# Patient Record
Sex: FEMALE | Race: OTHER | HISPANIC OR LATINO
[De-identification: names, ages, dates, MRNs, and addresses within clinical notes are randomized per-mention and may not be internally consistent; named-entity substitution may affect disease eponyms.]

---

## 2020-11-13 ENCOUNTER — APPOINTMENT (OUTPATIENT)
Dept: HEPATOLOGY | Facility: CLINIC | Age: 55
End: 2020-11-13
Payer: MEDICAID

## 2020-11-13 VITALS
HEIGHT: 58 IN | WEIGHT: 176 LBS | DIASTOLIC BLOOD PRESSURE: 83 MMHG | OXYGEN SATURATION: 97 % | HEART RATE: 68 BPM | RESPIRATION RATE: 16 BRPM | TEMPERATURE: 98 F | BODY MASS INDEX: 36.94 KG/M2 | SYSTOLIC BLOOD PRESSURE: 123 MMHG

## 2020-11-13 DIAGNOSIS — K75.9 INFLAMMATORY LIVER DISEASE, UNSPECIFIED: ICD-10-CM

## 2020-11-13 DIAGNOSIS — Z87.891 PERSONAL HISTORY OF NICOTINE DEPENDENCE: ICD-10-CM

## 2020-11-13 DIAGNOSIS — C50.911 MALIGNANT NEOPLASM OF UNSPECIFIED SITE OF RIGHT FEMALE BREAST: ICD-10-CM

## 2020-11-13 PROCEDURE — 99204 OFFICE O/P NEW MOD 45 MIN: CPT | Mod: 25

## 2020-11-13 NOTE — PHYSICAL EXAM
[Spider Angioma] : Spider angioma(s) was(were) observed [Non-Tender] : non-tender [Palmar Erythema] : Palmar Erythema [General Appearance - Alert] : alert [General Appearance - In No Acute Distress] : in no acute distress [General Appearance - Well Nourished] : well nourished [General Appearance - Well Developed] : well developed [Sclera] : the sclera and conjunctiva were normal [Oropharynx] : the oropharynx was normal [Respiration, Rhythm And Depth] : normal respiratory rhythm and effort [Exaggerated Use Of Accessory Muscles For Inspiration] : no accessory muscle use [Auscultation Breath Sounds / Voice Sounds] : lungs were clear to auscultation bilaterally [Heart Rate And Rhythm] : heart rate was normal and rhythm regular [Heart Sounds] : normal S1 and S2 [Edema] : there was no peripheral edema [Bowel Sounds] : normal bowel sounds [Abdomen Soft] : soft [Abdomen Tenderness] : non-tender [] : no hepato-splenomegaly [Abdomen Mass (___ Cm)] : no abdominal mass palpated [Abdomen Hernia] : no hernia was discovered [No CVA Tenderness] : no ~M costovertebral angle tenderness [No Spinal Tenderness] : no spinal tenderness [Abnormal Walk] : normal gait [Oriented To Time, Place, And Person] : oriented to person, place, and time [Impaired Insight] : insight and judgment were intact [Affect] : the affect was normal [Mood] : the mood was normal [Memory Recent] : recent memory was not impaired [Memory Remote] : remote memory was not impaired [Scleral Icterus] : No Scleral Icterus [Abdominal Bruit] : no abdominal bruit [Abdominal  Ascites] : no ascites [Asterixis] : no asterixis observed [Jaundice] : No jaundice [Depression] : no depression [Hallucinations] : ~T no ~M hallucinations [Delusions] : no ~T delusions [Suicidal Ideation] : no suicidal ideation [Homicidal Ideation] : no homicidal ideations [Preoccupiation With Violent Thoughts] : no preoccupation with violent thoughts [FreeTextEntry1] : Grossly intact

## 2020-11-13 NOTE — ASSESSMENT
[FreeTextEntry1] : 55 yo Female with Hx of breast cancer (Dx in 2014, s/p b/l mastectomy, s/p chemo-RT, was on anastrazole, as per patient was recently changed by his oncologist, Dr. Miles), obesity, colon polyps, Hx of cholecystectomy (gallstones), Hx of parathyroidectomy (no records, per patient was told to have "benign tumor"), glaucoma, recent conjunctivitis, was recently sent for liver biopsy by gastroenterologist, Dr. Alejandre ([phone]) because of abnormal liver tests. Biopsy was done 9/24/2020 at [facility]. The biopsy reports mild steatohepatitis, also moderate chronic inflammation and interface hepatitis (grade 2-3 of 4) and septal fibrous expansion and focal bridging fibrosis (stage 3-4). Not on medication. No labs available. With physical exam suggesting CLD. Here for 2nd opinion.\par \par - Ordered CBC, CMP, INR, Immunglobulin panel, ZAIRA, ASMA, anti-LKM, anti-SLA, AMA\par - Ordered HIV, hep A, B, C serology, tTG IgA, A1AT, ceruloplasmin\par - Ordered US abdomen\par - Will try to obtain records from Dr. Alejandre`s office\par - Will try to get records from Dr. Miles\par - Will try to get records from Dr. Giles`s office (per patient skin biopsy was planned)\par - Hold off with FibroScan until seeing labs since if high liver enzymes, could give falsely high result, and would do after improvement\par - Will reach out to liver pathologist Dr. De La Cruz for second opinion to evaluate slides - patient needs to request it\par - Further management based on above results\par - Discussed life style modifications, and natural Hx of steatohepatitis\par - Discussed that biopsy was favoring autoimmune hepatitis, and discussed natural Hx of AIH\par \par RTC 2 weeks

## 2020-11-13 NOTE — HISTORY OF PRESENT ILLNESS
[Needlestick Exposure] : no needlestick exposure [Infected Sexual Partner] : no infected sexual partner [IV Drug Use] : no IV drug use [Tattoo] : no tattoos [Body Piercing] : no body piercing [Hemodialysis] : no hemodialysis [Transfusion before 1992] : no transfusion before 1992 [Transplant before 1992] : no transplant before 1992 [Incarceration] : no incarceration [Alcohol Abuse] : no alcohol abuse [Autoimmune Disorder] : no autoimmune disorder [Household Contact to HBV] : no household contact to HBV [Travel to Endemic Area] : no travel to an endemic area [Occupational Exposure] : no occupational exposure [Cocaine Use] : no cocaine use [de-identified] : 55 yo Female with Hx of breast cancer (Dx in 2014, s/p b/l mastectomy, s/p chemo-RT, was on anastrazole, as per patient was recently changed by his oncologist, Dr. Miles), obesity, colon polyps, Hx of cholecystectomy (gallstones), Hx of parathyroidectomy (no records, per patient was told to have "benign tumor"), glaucoma, recent conjunctivitis, was recently sent for liver biopsy by gastroenterologist, Dr. Alejandre ([phone]) because of abnormal liver tests. No liver test records available. Biopsy was done 9/24/2020 at [facility]. The biopsy reports mild steatohepatitis, also moderate chronic inflammation and interface hepatitis (grade 2-3 of 4) and septal fibrous expansion and focal bridging fibrosis (stage 3-4). No labs available.\par Patient was not started on any medication for liver disease. Patient was sent for second opinion from Dr Miles`s office.  \par Patient is here today for initial evaluation. \par She states that overall feels well, but anxious because her daughter-in-law recently passed away during liver transplant. She denies any prior Hx of liver disease, FHx of liver disease, with obesity, being born in [facility] but no other risk factors. Denies OTC or herbal meds/supplements. \par She is noted with few spider naevi, palmar erythema.  [de-identified] : Born [facility] (moved in [place], last traveled 6 years ago)

## 2020-11-13 NOTE — REASON FOR VISIT
[Initial Evaluation] : an initial evaluation [Source: ______] : History obtained from [unfilled] [FreeTextEntry1] : Abnormal liver biopsy

## 2020-11-13 NOTE — REVIEW OF SYSTEMS
[Recent Weight Loss (___ Lbs)] : recent [unfilled] ~Ulb weight loss [Heartburn] : heartburn [Skin Lesions] : skin lesion [Negative] : Heme/Lymph [Fever] : no fever [Chills] : no chills [Abdominal Pain] : no abdominal pain [Vomiting] : no vomiting [Constipation] : no constipation [Diarrhea] : no diarrhea [Melena] : no melena [Dysuria] : no dysuria [Incontinence] : no incontinence [FreeTextEntry3] : 3 month "mendez feeling" of L eye, saw ophthalmologist, was diagnosed with conjunctivitis, treated, but also told to have glaucoma (was recommended surgery, pending) [FreeTextEntry7] : Hx of reflux, controlled with diet [FreeTextEntry8] : urinary frequency for 3 years, being followed by Urology [FreeTextEntry9] : Knee pain [de-identified] : LLE circular hyperkeratotic, hyperemic lesions (following with dermatology, Dr Giles  on [place]) [de-identified] : Hx of depression

## 2020-11-14 LAB
A1AT SERPL-MCNC: 127 MG/DL
AFP-TM SERPL-MCNC: 7.8 NG/ML
ALBUMIN SERPL ELPH-MCNC: 4.7 G/DL
ALP BLD-CCNC: 136 U/L
ALT SERPL-CCNC: 42 U/L
ANION GAP SERPL CALC-SCNC: 17 MMOL/L
AST SERPL-CCNC: 41 U/L
BASOPHILS # BLD AUTO: 0.02 K/UL
BASOPHILS NFR BLD AUTO: 0.4 %
BILIRUB DIRECT SERPL-MCNC: 0.1 MG/DL
BILIRUB SERPL-MCNC: 0.3 MG/DL
BUN SERPL-MCNC: 13 MG/DL
CALCIUM SERPL-MCNC: 10 MG/DL
CERULOPLASMIN SERPL-MCNC: 26 MG/DL
CHLORIDE SERPL-SCNC: 105 MMOL/L
CO2 SERPL-SCNC: 21 MMOL/L
CREAT SERPL-MCNC: 0.69 MG/DL
EOSINOPHIL # BLD AUTO: 0.12 K/UL
EOSINOPHIL NFR BLD AUTO: 2.2 %
GLUCOSE SERPL-MCNC: 102 MG/DL
HBV CORE IGG+IGM SER QL: NONREACTIVE
HBV SURFACE AB SER QL: REACTIVE
HBV SURFACE AG SER QL: NONREACTIVE
HCT VFR BLD CALC: 44.6 %
HCV AB SER QL: NONREACTIVE
HCV S/CO RATIO: 0.2 S/CO
HEPATITIS A IGG ANTIBODY: REACTIVE
HGB BLD-MCNC: 14 G/DL
HIV1+2 AB SPEC QL IA.RAPID: NONREACTIVE
IMM GRANULOCYTES NFR BLD AUTO: 0.2 %
INR PPP: 1.15 RATIO
LYMPHOCYTES # BLD AUTO: 2.45 K/UL
LYMPHOCYTES NFR BLD AUTO: 45.6 %
MAN DIFF?: NORMAL
MCHC RBC-ENTMCNC: 28.8 PG
MCHC RBC-ENTMCNC: 31.4 GM/DL
MCV RBC AUTO: 91.8 FL
MONOCYTES # BLD AUTO: 0.3 K/UL
MONOCYTES NFR BLD AUTO: 5.6 %
NEUTROPHILS # BLD AUTO: 2.47 K/UL
NEUTROPHILS NFR BLD AUTO: 46 %
PLATELET # BLD AUTO: 125 K/UL
POTASSIUM SERPL-SCNC: 3.9 MMOL/L
PROT SERPL-MCNC: 8.3 G/DL
PT BLD: 13.5 SEC
RBC # BLD: 4.86 M/UL
RBC # FLD: 14.2 %
SODIUM SERPL-SCNC: 143 MMOL/L
WBC # FLD AUTO: 5.37 K/UL

## 2020-11-16 LAB
DEPRECATED KAPPA LC FREE/LAMBDA SER: 1.35 RATIO
IGA SER QL IEP: 600 MG/DL
IGG SER QL IEP: 1717 MG/DL
IGM SER QL IEP: 102 MG/DL
KAPPA LC CSF-MCNC: 1.88 MG/DL
KAPPA LC SERPL-MCNC: 2.54 MG/DL
MITOCHONDRIA AB SER IF-ACNC: NORMAL
SMOOTH MUSCLE AB SER QL IF: ABNORMAL

## 2020-11-17 LAB
LKM AB SER QL IF: <20.1 UNITS
TTG IGA SER IA-ACNC: <1.2 U/ML
TTG IGA SER-ACNC: NEGATIVE
TTG IGG SER IA-ACNC: 3.5 U/ML
TTG IGG SER IA-ACNC: NEGATIVE

## 2020-11-18 ENCOUNTER — APPOINTMENT (OUTPATIENT)
Dept: ULTRASOUND IMAGING | Facility: HOSPITAL | Age: 55
End: 2020-11-18
Payer: MEDICAID

## 2020-11-18 ENCOUNTER — OUTPATIENT (OUTPATIENT)
Dept: OUTPATIENT SERVICES | Facility: HOSPITAL | Age: 55
LOS: 1 days | End: 2020-11-18
Payer: COMMERCIAL

## 2020-11-18 DIAGNOSIS — K74.00 HEPATIC FIBROSIS, UNSPECIFIED: ICD-10-CM

## 2020-11-18 LAB — SOLUBLE LIVER IGG SER IA-ACNC: 1.4

## 2020-11-18 PROCEDURE — 76700 US EXAM ABDOM COMPLETE: CPT

## 2020-11-18 PROCEDURE — 76700 US EXAM ABDOM COMPLETE: CPT | Mod: 26

## 2020-11-23 ENCOUNTER — NON-APPOINTMENT (OUTPATIENT)
Age: 55
End: 2020-11-23

## 2020-11-23 LAB
ANA SER IF-ACNC: NEGATIVE
HEV AB SER QL: POSITIVE

## 2020-11-24 ENCOUNTER — RESULT REVIEW (OUTPATIENT)
Age: 55
End: 2020-11-24

## 2020-12-09 ENCOUNTER — LABORATORY RESULT (OUTPATIENT)
Age: 55
End: 2020-12-09

## 2020-12-09 ENCOUNTER — APPOINTMENT (OUTPATIENT)
Dept: HEPATOLOGY | Facility: CLINIC | Age: 55
End: 2020-12-09
Payer: MEDICAID

## 2020-12-09 VITALS
BODY MASS INDEX: 36.94 KG/M2 | TEMPERATURE: 97.9 F | DIASTOLIC BLOOD PRESSURE: 87 MMHG | WEIGHT: 176 LBS | RESPIRATION RATE: 16 BRPM | HEIGHT: 58 IN | HEART RATE: 77 BPM | SYSTOLIC BLOOD PRESSURE: 106 MMHG | OXYGEN SATURATION: 98 %

## 2020-12-09 PROCEDURE — 99214 OFFICE O/P EST MOD 30 MIN: CPT

## 2020-12-09 PROCEDURE — 99072 ADDL SUPL MATRL&STAF TM PHE: CPT

## 2020-12-10 LAB
GGT SERPL-CCNC: 36 U/L
MPO AB + PR3 PNL SER: NORMAL
TSH SERPL-ACNC: 2.23 UIU/ML

## 2020-12-10 NOTE — ASSESSMENT
[FreeTextEntry1] : 53 yo Female with Hx of breast cancer (Dx in 2014, s/p b/l mastectomy, s/p chemo-RT, was on anastrazole, as per patient was recently changed by his oncologist, Dr. Miles), obesity, colon polyps, Hx of cholecystectomy (gallstones), Hx of parathyroidectomy (no records, per patient was told to have "benign tumor"), glaucoma, recent conjunctivitis, was recently sent for liver biopsy by gastroenterologist, Dr. Alejandre ([phone]) because of abnormal liver tests. Biopsy was done 9/24/2020 at [facility]. The biopsy reports mild steatohepatitis, also moderate chronic inflammation and interface hepatitis (grade 2-3 of 4) and septal fibrous expansion and focal bridging fibrosis (stage 3-4). Not on medication. No labs available. With physical exam suggesting CLD. Here for 2nd opinion.\par \par Moderate steatohepatitis \par - Discussed natural Hx of fatty liver and life style modifications in details\par - Ordered FibroScan\par \par Mild to moderate chronic hepatitis, suggestive of AIH based on biopsy or AI pattern drug induced\par - meds reviewed,  neither of them is linked to AI type liver injury\par - ZAIRA neg, ASMA weak pos 1:20, SLA and LKM neg, IgG elevated but < 2x ULN\par - Liver enzymes are minimally elevated, AST 41, ALT 42, \par - Based on above does not meet criteria of treatment for AIH, will continue to monitor, ordered Quantiferon, TPMT in case treatment needed in close future\par - per biopsy report, paucity of bile ducts, cannot r/o PBC,PSC, but no typical histological findings, AMA neg, ordered ANCA\par - Rest of liver workup unrevealing\par - US  11/2020 with hepatic steatosis, s/p cholecystectomy\par - Will try to obtain records from Dr. Alejandre`s office (peak liver enzymes)\par - Discussed with Dr. Miles (patient was on letrozole, but not since 8/2020, being on exemestane\par \par RTC 1 month

## 2020-12-10 NOTE — REVIEW OF SYSTEMS
[Recent Weight Loss (___ Lbs)] : recent [unfilled] ~Ulb weight loss [Heartburn] : heartburn [Skin Lesions] : skin lesion [Negative] : Eyes [Fever] : no fever [Chills] : no chills [Abdominal Pain] : no abdominal pain [Vomiting] : no vomiting [Constipation] : no constipation [Diarrhea] : no diarrhea [Melena] : no melena [Dysuria] : no dysuria [Incontinence] : no incontinence [FreeTextEntry7] : Hx of reflux, controlled with diet [FreeTextEntry8] : urinary frequency for 3 years, being followed by Urology [FreeTextEntry9] : Knee pain [de-identified] : LLE circular hyperkeratotic, hyperemic lesions (following with dermatology, Dr Giles  on [place]) - significantly improved, minimal discoloration [de-identified] : Hx of depression

## 2020-12-10 NOTE — PHYSICAL EXAM
[General Appearance - Alert] : alert [General Appearance - In No Acute Distress] : in no acute distress [General Appearance - Well Nourished] : well nourished [General Appearance - Well Developed] : well developed [Sclera] : the sclera and conjunctiva were normal [Oropharynx] : the oropharynx was normal [Respiration, Rhythm And Depth] : normal respiratory rhythm and effort [Exaggerated Use Of Accessory Muscles For Inspiration] : no accessory muscle use [Auscultation Breath Sounds / Voice Sounds] : lungs were clear to auscultation bilaterally [Heart Rate And Rhythm] : heart rate was normal and rhythm regular [Heart Sounds] : normal S1 and S2 [Edema] : there was no peripheral edema [Bowel Sounds] : normal bowel sounds [Abdomen Soft] : soft [Abdomen Tenderness] : non-tender [] : no hepato-splenomegaly [Abdomen Mass (___ Cm)] : no abdominal mass palpated [Abdomen Hernia] : no hernia was discovered [No CVA Tenderness] : no ~M costovertebral angle tenderness [No Spinal Tenderness] : no spinal tenderness [Abnormal Walk] : normal gait [Oriented To Time, Place, And Person] : oriented to person, place, and time [Impaired Insight] : insight and judgment were intact [Affect] : the affect was normal [Mood] : the mood was normal [Memory Recent] : recent memory was not impaired [Memory Remote] : remote memory was not impaired [Spider Angioma] : Spider angioma(s) was(were) observed [Non-Tender] : non-tender [Palmar Erythema] : Palmar Erythema [FreeTextEntry1] : Grossly intact [Scleral Icterus] : No Scleral Icterus [Abdominal Bruit] : no abdominal bruit [Abdominal  Ascites] : no ascites [Asterixis] : no asterixis observed [Jaundice] : No jaundice [Depression] : no depression [Hallucinations] : ~T no ~M hallucinations [Delusions] : no ~T delusions [Suicidal Ideation] : no suicidal ideation [Homicidal Ideation] : no homicidal ideations [Preoccupiation With Violent Thoughts] : no preoccupation with violent thoughts

## 2020-12-10 NOTE — HISTORY OF PRESENT ILLNESS
[Needlestick Exposure] : no needlestick exposure [Infected Sexual Partner] : no infected sexual partner [IV Drug Use] : no IV drug use [Tattoo] : no tattoos [Body Piercing] : no body piercing [Hemodialysis] : no hemodialysis [Transfusion before 1992] : no transfusion before 1992 [Transplant before 1992] : no transplant before 1992 [Incarceration] : no incarceration [Alcohol Abuse] : no alcohol abuse [Autoimmune Disorder] : no autoimmune disorder [Household Contact to HBV] : no household contact to HBV [Travel to Endemic Area] : no travel to an endemic area [Occupational Exposure] : no occupational exposure [Cocaine Use] : no cocaine use [de-identified] : Patient stated that do not need , preferred discussion in English.\par 55 yo Female with Hx of breast cancer (Dx in 2014, s/p b/l mastectomy, s/p chemo-RT, was on anastrazole, as per patient was recently changed by his oncologist, Dr. Miles), obesity, colon polyps, Hx of cholecystectomy (gallstones), Hx of parathyroidectomy (no records, per patient was told to have "benign tumor"), glaucoma, recent conjunctivitis, was recently sent for liver biopsy by gastroenterologist, Dr. Alejandre ([phone]) because of abnormal liver tests. No liver test records available. Biopsy was done 9/24/2020 at [facility]. The biopsy reports mild steatohepatitis, also moderate chronic inflammation and interface hepatitis (grade 2-3 of 4) and septal fibrous expansion and focal bridging fibrosis (stage 3-4). \par Patient was not started on any medication for liver disease. Patient was sent for second opinion from Dr Miles`s office.  \par \par Biopsy samples were obtained and reviewed by Dr. Reji eD La Cruz liver pathologist at [facility]: Moderate steatohepatitis and Mild-to moderate chronic hepatitis with differential of AIH or AI pattern induced by drug, and while no features, but PBC or PSC could not be ruled out either. There was bile duct paucity. There was F3 fibrosis. \par \par Patient is here for follow up.\par \par She states that overall feels well, but anxious because her daughter-in-law recently passed away during liver transplant. \par She denied any prior Hx of liver disease, FHx of liver disease, with obesity, being born in [facility] but no other risk factors. Denies OTC or herbal meds/supplements. \par She is noted with few spider naevi, palmar erythema.  [de-identified] : Born [facility] (moved in [place], last traveled 6 years ago)

## 2020-12-16 LAB
ALBUMIN SERPL ELPH-MCNC: 4.7 G/DL
ALP BLD-CCNC: 139 U/L
ALT SERPL-CCNC: 37 U/L
AST SERPL-CCNC: 33 U/L
BILIRUB DIRECT SERPL-MCNC: 0.1 MG/DL
BILIRUB INDIRECT SERPL-MCNC: 0.2 MG/DL
BILIRUB SERPL-MCNC: 0.3 MG/DL
FERRITIN SERPL-MCNC: 52 NG/ML
HEPATITIS E IGM ABY: NORMAL
IRON SATN MFR SERPL: 22 %
IRON SERPL-MCNC: 94 UG/DL
M TB IFN-G BLD-IMP: NEGATIVE
PROT SERPL-MCNC: 7.9 G/DL
QUANTIFERON TB PLUS MITOGEN MINUS NIL: 9.4 IU/ML
QUANTIFERON TB PLUS NIL: 0.02 IU/ML
QUANTIFERON TB PLUS TB1 MINUS NIL: 0.27 IU/ML
QUANTIFERON TB PLUS TB2 MINUS NIL: 0.23 IU/ML
TIBC SERPL-MCNC: 427 UG/DL
TPMT ENZYME INTERPRETATION: NORMAL
TPMT ENZYME METHODOLOGY: NORMAL
TPMT ENZYME: 16.7
UIBC SERPL-MCNC: 333 UG/DL

## 2020-12-20 LAB
BASOPHILS # BLD AUTO: 0.05 K/UL
BASOPHILS NFR BLD AUTO: 0.9 %
EOSINOPHIL # BLD AUTO: 0.11 K/UL
EOSINOPHIL NFR BLD AUTO: 2 %
ESTIMATED AVERAGE GLUCOSE: 117 MG/DL
HBA1C MFR BLD HPLC: 5.7 %
HCT VFR BLD CALC: 42.9 %
HGB BLD-MCNC: 14 G/DL
IGG SUBSET TOTAL IGG: 1708 MG/DL
IGG1 SER-MCNC: 971 MG/DL
IGG2 SER-MCNC: 435 MG/DL
IGG3 SER-MCNC: 85 MG/DL
IGG4 SER-MCNC: 95 MG/DL
IMM GRANULOCYTES NFR BLD AUTO: 0.2 %
INR PPP: 1.16 RATIO
LYMPHOCYTES # BLD AUTO: 2.88 K/UL
LYMPHOCYTES NFR BLD AUTO: 52.2 %
MAN DIFF?: NORMAL
MCHC RBC-ENTMCNC: 28.8 PG
MCHC RBC-ENTMCNC: 32.6 GM/DL
MCV RBC AUTO: 88.3 FL
MONOCYTES # BLD AUTO: 0.27 K/UL
MONOCYTES NFR BLD AUTO: 4.9 %
NEUTROPHILS # BLD AUTO: 2.2 K/UL
NEUTROPHILS NFR BLD AUTO: 39.8 %
PLATELET # BLD AUTO: 145 K/UL
PT BLD: 13.7 SEC
RBC # BLD: 4.86 M/UL
RBC # FLD: 13.9 %
WBC # FLD AUTO: 5.52 K/UL

## 2021-01-13 ENCOUNTER — APPOINTMENT (OUTPATIENT)
Dept: HEPATOLOGY | Facility: CLINIC | Age: 56
End: 2021-01-13
Payer: MEDICAID

## 2021-01-13 VITALS
RESPIRATION RATE: 16 BRPM | OXYGEN SATURATION: 98 % | TEMPERATURE: 98.2 F | HEART RATE: 71 BPM | WEIGHT: 172 LBS | BODY MASS INDEX: 36.11 KG/M2 | SYSTOLIC BLOOD PRESSURE: 98 MMHG | HEIGHT: 58 IN | DIASTOLIC BLOOD PRESSURE: 62 MMHG

## 2021-01-13 PROCEDURE — 99072 ADDL SUPL MATRL&STAF TM PHE: CPT

## 2021-01-13 PROCEDURE — 99215 OFFICE O/P EST HI 40 MIN: CPT

## 2021-01-13 RX ORDER — OMEPRAZOLE 20 MG/1
20 CAPSULE, DELAYED RELEASE ORAL
Refills: 0 | Status: DISCONTINUED | COMMUNITY
End: 2021-01-13

## 2021-01-14 NOTE — REVIEW OF SYSTEMS
[Recent Weight Loss (___ Lbs)] : recent [unfilled] ~Ulb weight loss [Heartburn] : heartburn [Skin Lesions] : skin lesion [Negative] : Heme/Lymph [Fever] : no fever [Chills] : no chills [Abdominal Pain] : no abdominal pain [Vomiting] : no vomiting [Constipation] : no constipation [Diarrhea] : no diarrhea [Melena] : no melena [Dysuria] : no dysuria [Incontinence] : no incontinence [FreeTextEntry7] : Hx of reflux, controlled with diet and on famotidine [FreeTextEntry2] : 4 lb since last visit, intentional [FreeTextEntry8] : urinary frequency for 3 years, being followed by Urology [FreeTextEntry9] : Knee pain and reports "bone pain" in lower extremities [de-identified] : LLE circular hyperkeratotic, hyperemic lesions (following with dermatology, Dr Giles  on [place]) - significantly improved, minimal discoloration [de-identified] : Hx of depression

## 2021-01-14 NOTE — ASSESSMENT
[FreeTextEntry1] : 56 yo Female with Hx of breast cancer (Dx in 2014, s/p b/l mastectomy, s/p chemo-RT, was on anastrazole, as per patient was recently changed by his oncologist, Dr. Miles), obesity, colon polyps, Hx of cholecystectomy (gallstones), Hx of parathyroidectomy (no records, per patient was told to have "benign tumor"), glaucoma, recent conjunctivitis, was recently sent for liver biopsy by gastroenterologist, Dr. Alejandre ([phone]) because of abnormal liver tests. Biopsy was done 9/24/2020 at [facility]. The biopsy reports mild steatohepatitis, also moderate chronic inflammation and interface hepatitis (grade 2-3 of 4) and septal fibrous expansion and focal bridging fibrosis (stage 3-4). Not on medication. No labs available. With physical exam suggesting CLD. Was sent for 2nd opinion.\par \par Moderate steatohepatitis \par - Discussed natural Hx of fatty liver and life style modifications in details\par - Ordered FibroScan, not done yet, scheduled\par \par Mild to moderate chronic hepatitis, suggestive of AIH based on biopsy or AI pattern drug induced\par - meds reviewed,  neither of them is linked to AI type liver injury\par - ZAIRA neg, ASMA weak pos 1:20, SLA and LKM neg, IgG elevated but < 2x ULN\par - Liver enzymes are minimally elevated, AST 41, ALT 42, \par - Based on above does not meet criteria of treatment for AIH, will continue to monitor, last visit ordered Quantiferon, TPMT in case treatment needed in close future\par - per biopsy report, paucity of bile ducts, cannot r/o PBC,PSC, but no typical histological findings, AMA neg, IgM nl, ANCA neg\par - Rest of liver workup unrevealing\par - US  11/2020 with hepatic steatosis, s/p cholecystectomy\par - Will try to obtain records from Dr. Alejandre`s office (peak liver enzymes)\par - Discussed with Dr. Miles: patient was on letrozole, but not since 8/2020, now being on exemestane\par \par Advanced fibrosis\par - no ascites, no PSE, discussed with her that after FibroScan will discuss screening EGD, no focal mass, will c/w HCC surveillance, hep A and B immune\par \par Osteopenia\par - start on vit D calcium\par \par Bone pain, hx of breast ca (although was local), ALP elevation, with nl GGT and now nl transaminases\par - ordered bone scan\par \par RTC 1 month

## 2021-01-14 NOTE — REASON FOR VISIT
[Follow-Up: _____] : a [unfilled] follow-up visit [FreeTextEntry1] : BYERS and interface hepatitis on biopsy  but neg autoantibodies, advanced fibrosis

## 2021-01-14 NOTE — HISTORY OF PRESENT ILLNESS
[Needlestick Exposure] : no needlestick exposure [Infected Sexual Partner] : no infected sexual partner [IV Drug Use] : no IV drug use [Tattoo] : no tattoos [Body Piercing] : no body piercing [Hemodialysis] : no hemodialysis [Transfusion before 1992] : no transfusion before 1992 [Transplant before 1992] : no transplant before 1992 [Incarceration] : no incarceration [Alcohol Abuse] : no alcohol abuse [Autoimmune Disorder] : no autoimmune disorder [Household Contact to HBV] : no household contact to HBV [Travel to Endemic Area] : no travel to an endemic area [Occupational Exposure] : no occupational exposure [Cocaine Use] : no cocaine use [de-identified] : Born [facility] (moved in [place], last traveled 6 years ago) [de-identified] : Patient stated that do not need , preferred discussion in English.\par 56 yo Female with Hx of breast cancer (Dx in 2014, s/p b/l mastectomy, s/p chemo-RT, was on anastrazole, as per patient was recently changed by his oncologist, Dr. Miles), obesity, colon polyps, Hx of cholecystectomy (gallstones), Hx of parathyroidectomy (no records, per patient was told to have "benign tumor"), glaucoma, recent conjunctivitis, was recently sent for liver biopsy by gastroenterologist, Dr. Alejandre ([phone]) because of abnormal liver tests. No liver test records available from that time. Biopsy was done 9/24/2020 at [facility]. The biopsy reports mild steatohepatitis, also moderate chronic inflammation and interface hepatitis (grade 2-3 of 4) and septal fibrous expansion and focal bridging fibrosis (stage 3-4). \par Patient was not started on any medication for liver disease. Patient was sent for second opinion from Dr Miles`s office.  \par \par Biopsy samples were obtained and reviewed by Dr. Reji De La Cruz liver pathologist at [facility]: Moderate steatohepatitis and Mild-to moderate chronic hepatitis with differential of AIH or AI pattern induced by drug, and while no features, but PBC or PSC could not be ruled out either. There was bile duct paucity. There was F3 fibrosis. \par \par Patient is here for follow up.\par \par She states that overall feels well, but anxious because her daughter-in-law passed away during liver transplant. \par She denied any prior Hx of liver disease, FHx of liver disease, with obesity, being born in [facility] but no other risk factors. Denies OTC or herbal meds/supplements. \par She is noted with few spider naevi, palmar erythema.

## 2021-01-20 ENCOUNTER — APPOINTMENT (OUTPATIENT)
Dept: HEPATOLOGY | Facility: CLINIC | Age: 56
End: 2021-01-20
Payer: MEDICAID

## 2021-01-20 PROCEDURE — 91200 LIVER ELASTOGRAPHY: CPT

## 2021-01-20 PROCEDURE — 99072 ADDL SUPL MATRL&STAF TM PHE: CPT

## 2021-02-10 LAB
25(OH)D3 SERPL-MCNC: 44.4 NG/ML
ALBUMIN SERPL ELPH-MCNC: 4.5 G/DL
ALKPISO INTERP: NORMAL
ALP BLD-CCNC: 149 U/L
ALP BLD-CCNC: 164 U/L
ALP BONE CFR SERPL: 59 %
ALP INTEST CFR SERPL: 15 %
ALP LIVER CFR SERPL: 26 %
ALP MACRO CFR SERPL: 0 %
ALP PLAC CFR SERPL: 0 %
ALT SERPL-CCNC: 32 U/L
ANION GAP SERPL CALC-SCNC: 14 MMOL/L
AST SERPL-CCNC: 31 U/L
BASOPHILS # BLD AUTO: 0.04 K/UL
BASOPHILS NFR BLD AUTO: 0.7 %
BILIRUB DIRECT SERPL-MCNC: 0.1 MG/DL
BILIRUB INDIRECT SERPL-MCNC: 0.2 MG/DL
BILIRUB SERPL-MCNC: 0.2 MG/DL
BUN SERPL-MCNC: 14 MG/DL
CALCIUM SERPL-MCNC: 9.8 MG/DL
CHLORIDE SERPL-SCNC: 104 MMOL/L
CO2 SERPL-SCNC: 21 MMOL/L
CREAT SERPL-MCNC: 0.73 MG/DL
EOSINOPHIL # BLD AUTO: 0.08 K/UL
EOSINOPHIL NFR BLD AUTO: 1.5 %
GLUCOSE SERPL-MCNC: 87 MG/DL
HCT VFR BLD CALC: 45.2 %
HGB BLD-MCNC: 14.2 G/DL
IMM GRANULOCYTES NFR BLD AUTO: 0.2 %
INR PPP: 1.1 RATIO
LYMPHOCYTES # BLD AUTO: 2.78 K/UL
LYMPHOCYTES NFR BLD AUTO: 51.2 %
MAN DIFF?: NORMAL
MCHC RBC-ENTMCNC: 28.2 PG
MCHC RBC-ENTMCNC: 31.4 GM/DL
MCV RBC AUTO: 89.7 FL
MONOCYTES # BLD AUTO: 0.34 K/UL
MONOCYTES NFR BLD AUTO: 6.3 %
NEUTROPHILS # BLD AUTO: 2.18 K/UL
NEUTROPHILS NFR BLD AUTO: 40.1 %
PLATELET # BLD AUTO: 148 K/UL
POTASSIUM SERPL-SCNC: 4.4 MMOL/L
PROT SERPL-MCNC: 7.8 G/DL
PT BLD: 13 SEC
RBC # BLD: 5.04 M/UL
RBC # FLD: 13.9 %
SARS-COV-2 IGG SERPL IA-ACNC: 0.07 INDEX
SARS-COV-2 IGG SERPL QL IA: NEGATIVE
SODIUM SERPL-SCNC: 139 MMOL/L
WBC # FLD AUTO: 5.43 K/UL

## 2021-02-17 ENCOUNTER — APPOINTMENT (OUTPATIENT)
Dept: HEPATOLOGY | Facility: CLINIC | Age: 56
End: 2021-02-17
Payer: MEDICAID

## 2021-02-17 VITALS
BODY MASS INDEX: 36.11 KG/M2 | SYSTOLIC BLOOD PRESSURE: 103 MMHG | TEMPERATURE: 98.1 F | WEIGHT: 172 LBS | HEART RATE: 77 BPM | HEIGHT: 58 IN | DIASTOLIC BLOOD PRESSURE: 69 MMHG | OXYGEN SATURATION: 98 % | RESPIRATION RATE: 17 BRPM

## 2021-02-17 PROCEDURE — 99072 ADDL SUPL MATRL&STAF TM PHE: CPT

## 2021-02-17 PROCEDURE — 99215 OFFICE O/P EST HI 40 MIN: CPT

## 2021-02-18 LAB
INR PPP: 1.19 RATIO
PT BLD: 14.1 SEC

## 2021-02-21 NOTE — ASSESSMENT
[FreeTextEntry1] : 54 yo Female with Hx of breast cancer (Dx in 2014, s/p b/l mastectomy, s/p chemo-RT, was on anastrazole, as per patient was recently changed by his oncologist, Dr. Miles), obesity (BMI 35), colon polyps, Hx of cholecystectomy (gallstones), Hx of parathyroidectomy (no records, per patient was told to have "benign tumor"), glaucoma, recent conjunctivitis, was recently sent for liver biopsy by gastroenterologist, Dr. Alejandre ([phone]) because of abnormal liver tests. Biopsy was done 9/24/2020 at [facility]. The biopsy reports mild steatohepatitis, also moderate chronic inflammation and interface hepatitis (grade 2-3 of 4) and septal fibrous expansion and focal bridging fibrosis (stage 3-4).\par \par Moderate steatohepatitis \par - Discussed natural Hx of fatty liver and life style modifications in details\par - FibroScan S3, F3\par - Congratulated on efforts and willingness on diet, exercise, but discussed that balanced diet is preferred over drastic diets. \par \par Mild to moderate chronic hepatitis, suggestive of AIH based on biopsy or AI pattern drug induced\par - meds reviewed,  neither of them is linked to AI type liver injury\par - ZAIRA neg, ASMA weak pos 1:20, SLA and LKM neg, IgG elevated but < 2x ULN\par - Liver enzymes are minimally elevated, AST 41, ALT 42, , repeat AST 31, ALT 32, but \par - Based on above does not meet criteria of treatment for AIH, will continue to monitor (Quantiferon neg, TPMT normal)\par - per biopsy report, paucity of bile ducts, cannot r/o PBC,PSC, but no typical histological findings, AMA neg, IgM nl, ANCA neg; PBC specific ZAIRA was ordered; if ALP remains elevated, will try Ursodiol\par - Rest of liver workup unrevealing\par - US  11/2020 with hepatic steatosis, s/p cholecystectomy\par - Will try to obtain records from Dr. Alejandre`s office (peak liver enzymes)\par - Discussed with Dr. Miles: patient was on letrozole, but not since 8/2020, now being on exemestane\par \par Advanced fibrosis\par - no ascites, no PSE, F3 fibrosis, but given signs of CLD, will q 6 months HCC surveillance, hep A and B immune\par \par Osteopenia\par - started on vit D calcium last visit\par \par Bone pain, hx of breast ca (although was local), ALP elevation, with nl GGT and now nl transaminases\par - ordered bone scan last visit, not done yet, printed again\par \par  Rash on scalp\par - F/u with dermatology\par \par Chest pressure \par - 3 days ago redness in ears, chest pressure, resolved, following w cardiology, asked to call her cardiologist today\par \par RTC 1 month

## 2021-02-21 NOTE — REVIEW OF SYSTEMS
[Heartburn] : heartburn [Skin Lesions] : skin lesion [Negative] : Heme/Lymph [Fever] : no fever [Chills] : no chills [Abdominal Pain] : no abdominal pain [Vomiting] : no vomiting [Constipation] : no constipation [Diarrhea] : no diarrhea [Melena] : no melena [Dysuria] : no dysuria [Incontinence] : no incontinence [FreeTextEntry5] : Had 3 days ago chest discomfort, "warmness" [FreeTextEntry7] : Hx of reflux, controlled with diet and on famotidine [FreeTextEntry9] : Knee pain and reports "bone pain" in lower extremities [FreeTextEntry8] : urinary frequency for 3 years, being followed by Urology [de-identified] : LLE circular hyperkeratotic, hyperemic lesions almost resolved, some residual discoloration (following with dermatology, Dr Giles  on [place]) - significantly improved, minimal discoloration [de-identified] : Hx of depression

## 2021-02-21 NOTE — HISTORY OF PRESENT ILLNESS
[Needlestick Exposure] : no needlestick exposure [Infected Sexual Partner] : no infected sexual partner [IV Drug Use] : no IV drug use [Tattoo] : no tattoos [Body Piercing] : no body piercing [Hemodialysis] : no hemodialysis [Transfusion before 1992] : no transfusion before 1992 [Transplant before 1992] : no transplant before 1992 [Alcohol Abuse] : no alcohol abuse [Incarceration] : no incarceration [Autoimmune Disorder] : no autoimmune disorder [Household Contact to HBV] : no household contact to HBV [Travel to Endemic Area] : no travel to an endemic area [Cocaine Use] : no cocaine use [Occupational Exposure] : no occupational exposure [de-identified] : Patient stated that do not need , preferred discussion in English.\par 56 yo obese (BMI 35)  Female with Hx of breast cancer (Dx in 2014, s/p b/l mastectomy, s/p chemo-RT, was on anastrazole, as per patient was recently changed by his oncologist, Dr. Miles), colon polyps, Hx of cholecystectomy (gallstones), Hx of parathyroidectomy (no records, per patient was told to have "benign tumor"), glaucoma, recent conjunctivitis, s/p liver biopsy by gastroenterologist, Dr. Alejandre ([phone]) because of abnormal liver tests. No liver test records available from that time. Biopsy was done 9/24/2020 at [facility]. The biopsy reports mild steatohepatitis, also moderate chronic inflammation and interface hepatitis (grade 2-3 of 4) and septal fibrous expansion and focal bridging fibrosis (stage 3-4). \par Patient was not started on any medication for liver disease. Patient was sent for second opinion from Dr Miles`s office.  \par \par Biopsy samples were obtained and reviewed by Dr. Reji De La Cruz liver pathologist at [facility]: Moderate steatohepatitis and Mild-to moderate chronic hepatitis with differential of AIH or AI pattern induced by drug, and while no features, but PBC or PSC could not be ruled out either. There was bile duct paucity. There was F3 fibrosis. FibroScan also showed S3 steatosis and F3 firbosis.\par \par Patient`s liver enzymes were WNL except ALP. AI markers showed neg ZAIRA, minimally elevated IgG and mildly pos ASMA (1:20), neg anti LKM and neg anti SLA. AMA was neg and IgM was nl. \par She has pending bone scan, b/o Hxx of breast cancer and solely ALP elevation. \par \par Today she is here for follow up. She states that overall feels well, but still anxious because her daughter-in-law passed away during liver transplant. She started to diet since last visit, a drastic diet, including only vegetable juices. Discussed importance of balanced diet. \par \par She denied any prior Hx of liver disease, FHx of liver disease, with obesity, being born in [facility] but no other risk factors. Denies OTC or herbal meds/supplements. \par She is noted with few spider naevi, palmar erythema.  [de-identified] : Born [facility] (moved in [place], last traveled 6 years ago)

## 2021-02-21 NOTE — PHYSICAL EXAM
[Spider Angioma] : Spider angioma(s) was(were) observed [Non-Tender] : non-tender [Palmar Erythema] : Palmar Erythema [General Appearance - Alert] : alert [General Appearance - In No Acute Distress] : in no acute distress [General Appearance - Well Nourished] : well nourished [General Appearance - Well Developed] : well developed [Sclera] : the sclera and conjunctiva were normal [Oropharynx] : the oropharynx was normal [Respiration, Rhythm And Depth] : normal respiratory rhythm and effort [Exaggerated Use Of Accessory Muscles For Inspiration] : no accessory muscle use [Auscultation Breath Sounds / Voice Sounds] : lungs were clear to auscultation bilaterally [Heart Rate And Rhythm] : heart rate was normal and rhythm regular [Heart Sounds] : normal S1 and S2 [Edema] : there was no peripheral edema [Bowel Sounds] : normal bowel sounds [Abdomen Soft] : soft [Abdomen Tenderness] : non-tender [Abdomen Mass (___ Cm)] : no abdominal mass palpated [Abdomen Hernia] : no hernia was discovered [No CVA Tenderness] : no ~M costovertebral angle tenderness [No Spinal Tenderness] : no spinal tenderness [Abnormal Walk] : normal gait [Oriented To Time, Place, And Person] : oriented to person, place, and time [Impaired Insight] : insight and judgment were intact [Affect] : the affect was normal [Mood] : the mood was normal [Memory Recent] : recent memory was not impaired [Memory Remote] : remote memory was not impaired [Neck Appearance] : the appearance of the neck was normal [] : the neck was supple [Jugular Venous Distention Increased] : there was no jugular-venous distention [Scleral Icterus] : No Scleral Icterus [Abdominal Bruit] : no abdominal bruit [Abdominal  Ascites] : no ascites [Asterixis] : no asterixis observed [Jaundice] : No jaundice [Depression] : no depression [Delusions] : no ~T delusions [Hallucinations] : ~T no ~M hallucinations [Suicidal Ideation] : no suicidal ideation [Homicidal Ideation] : no homicidal ideations [Preoccupiation With Violent Thoughts] : no preoccupation with violent thoughts [FreeTextEntry1] : Grossly intact

## 2021-02-22 LAB
5NT SERPL-CCNC: 4 IU/L
ANION GAP SERPL CALC-SCNC: 15 MMOL/L
BUN SERPL-MCNC: 13 MG/DL
CALCIUM SERPL-MCNC: 10.1 MG/DL
CHLORIDE SERPL-SCNC: 102 MMOL/L
CO2 SERPL-SCNC: 24 MMOL/L
CREAT SERPL-MCNC: 0.8 MG/DL
DEPRECATED KAPPA LC FREE/LAMBDA SER: 1.25 RATIO
GGT SERPL-CCNC: 37 U/L
GLUCOSE SERPL-MCNC: 100 MG/DL
IGA SER QL IEP: 537 MG/DL
IGG SER QL IEP: 1544 MG/DL
IGG4 SER-MCNC: 75 MG/DL
IGM SER QL IEP: 109 MG/DL
KAPPA LC CSF-MCNC: 1.84 MG/DL
KAPPA LC SERPL-MCNC: 2.3 MG/DL
MITOCHONDRIA AB SER IF-ACNC: NORMAL
POTASSIUM SERPL-SCNC: 4.2 MMOL/L
SMOOTH MUSCLE AB SER QL IF: NORMAL
SODIUM SERPL-SCNC: 142 MMOL/L

## 2021-02-25 LAB
ALBUMIN SERPL ELPH-MCNC: 4.5 G/DL
ALP BLD-CCNC: 162 U/L
ALP BONE SERPL-MCNC: 51.1 UG/L
ALT SERPL-CCNC: 38 U/L
ANA PAT FLD IF-IMP: ABNORMAL
ANA SER IF-ACNC: ABNORMAL
AST SERPL-CCNC: 41 U/L
BILIRUB DIRECT SERPL-MCNC: 0.1 MG/DL
BILIRUB INDIRECT SERPL-MCNC: 0.2 MG/DL
BILIRUB SERPL-MCNC: 0.3 MG/DL
PROT SERPL-MCNC: 8.2 G/DL

## 2021-02-26 LAB — SOLUBLE LIVER IGG SER IA-ACNC: 1.5

## 2021-03-11 PROBLEM — K75.9 HEPATITIS: Status: ACTIVE | Noted: 2020-11-13

## 2021-03-18 LAB
MISCELLANEOUS TEST: NORMAL
PROC NAME: NORMAL

## 2021-04-21 ENCOUNTER — APPOINTMENT (OUTPATIENT)
Dept: HEPATOLOGY | Facility: CLINIC | Age: 56
End: 2021-04-21
Payer: MEDICAID

## 2021-04-21 VITALS
HEIGHT: 58 IN | OXYGEN SATURATION: 97 % | DIASTOLIC BLOOD PRESSURE: 79 MMHG | RESPIRATION RATE: 16 BRPM | HEART RATE: 84 BPM | SYSTOLIC BLOOD PRESSURE: 115 MMHG | WEIGHT: 172 LBS | BODY MASS INDEX: 36.11 KG/M2 | TEMPERATURE: 98.2 F

## 2021-04-21 PROCEDURE — 99072 ADDL SUPL MATRL&STAF TM PHE: CPT

## 2021-04-21 PROCEDURE — 99215 OFFICE O/P EST HI 40 MIN: CPT

## 2021-04-25 NOTE — HISTORY OF PRESENT ILLNESS
[Needlestick Exposure] : no needlestick exposure [Infected Sexual Partner] : no infected sexual partner [IV Drug Use] : no IV drug use [Tattoo] : no tattoos [Body Piercing] : no body piercing [Hemodialysis] : no hemodialysis [Transfusion before 1992] : no transfusion before 1992 [Transplant before 1992] : no transplant before 1992 [Incarceration] : no incarceration [Alcohol Abuse] : no alcohol abuse [Autoimmune Disorder] : no autoimmune disorder [Household Contact to HBV] : no household contact to HBV [Travel to Endemic Area] : no travel to an endemic area [Occupational Exposure] : no occupational exposure [Cocaine Use] : no cocaine use [de-identified] : Patient stated that do not need , preferred discussion in English.\par \par 56 yo obese (BMI 35)  Female with Hx of breast cancer (Dx in 2014, s/p b/l mastectomy, s/p chemo-RT, was on anastrazole, as per patient was recently changed by his oncologist, Dr. Miles), colon polyps, Hx of cholecystectomy (gallstones), Hx of parathyroidectomy (no records, per patient was told to have "benign tumor"), glaucoma, recent conjunctivitis, s/p liver biopsy by gastroenterologist, Dr. Alejandre ([phone]) because of abnormal liver tests. No liver test records available from that time. Biopsy was done 9/24/2020 at [facility]. The biopsy reports mild steatohepatitis, also moderate chronic inflammation and interface hepatitis (grade 2-3 of 4) and septal fibrous expansion and focal bridging fibrosis (stage 3-4). \par Patient was not started on any medication for liver disease. Patient was sent for second opinion from Dr Miles`s office.  \par \par Biopsy samples were obtained and reviewed by Dr. Reji De La Cruz liver pathologist at [facility]: Moderate steatohepatitis and Mild-to moderate chronic hepatitis with differential of AIH or AI pattern induced by drug, and while no features, but PBC or PSC could not be ruled out either. There was bile duct paucity. There was F3 fibrosis. FibroScan also showed S3 steatosis and F3 firbosis.\par \par Patient`s liver enzymes were WNL except ALP. AI markers showed neg ZAIRA, minimally elevated IgG and mildly pos ASMA (1:20), neg anti LKM and neg anti SLA. AMA was neg and IgM was nl. \par She had bone scan, b/o Hx of breast cancer and solely ALP elevation, and it was negative for metastatic disease. \par \par Today she is here for follow up. She states that overall feels well, but still anxious because her daughter-in-law passed away during liver transplant. Sheaborted her drastic diet and started balanced diet. \par \par She denied any prior Hx of liver disease, FHx of liver disease, with obesity, being born in [facility] but no other risk factors. Denies OTC or herbal meds/supplements. \par She is noted with few spider naevi, palmar erythema. \par \par S/p Moderna 1st 3/1/21 2nd 3/29/21, had high fever for 3 days, was taking Ibuprofen then. \par She was seen  by dermatology yesterday for maculopapular lesion, small, red, on extremities. [de-identified] : Born [facility] (moved in [place], last traveled 6 years ago)

## 2021-04-25 NOTE — REVIEW OF SYSTEMS
[Heartburn] : heartburn [Skin Lesions] : skin lesion [Negative] : Heme/Lymph [Fever] : no fever [Chills] : no chills [Abdominal Pain] : no abdominal pain [Vomiting] : no vomiting [Constipation] : no constipation [Diarrhea] : no diarrhea [Melena] : no melena [Dysuria] : no dysuria [Incontinence] : no incontinence [FreeTextEntry7] : Hx of reflux, controlled with diet and on famotidine [FreeTextEntry8] : urinary frequency for 3 years, being followed by Urology [FreeTextEntry9] : Knee pain and reports "bone pain" in lower extremities [de-identified] : Hx of depression [de-identified] : small, circular, red, maculopapular lesions on extremities - following with dermatology

## 2021-04-25 NOTE — PHYSICAL EXAM
[Spider Angioma] : Spider angioma(s) was(were) observed [Non-Tender] : non-tender [Palmar Erythema] : Palmar Erythema [General Appearance - In No Acute Distress] : in no acute distress [General Appearance - Alert] : alert [General Appearance - Well Nourished] : well nourished [General Appearance - Well Developed] : well developed [Sclera] : the sclera and conjunctiva were normal [Oropharynx] : the oropharynx was normal [Neck Appearance] : the appearance of the neck was normal [Jugular Venous Distention Increased] : there was no jugular-venous distention [Respiration, Rhythm And Depth] : normal respiratory rhythm and effort [Exaggerated Use Of Accessory Muscles For Inspiration] : no accessory muscle use [Auscultation Breath Sounds / Voice Sounds] : lungs were clear to auscultation bilaterally [Heart Rate And Rhythm] : heart rate was normal and rhythm regular [Heart Sounds] : normal S1 and S2 [Edema] : there was no peripheral edema [Bowel Sounds] : normal bowel sounds [Abdomen Soft] : soft [Abdomen Tenderness] : non-tender [] : no hepato-splenomegaly [Abdomen Mass (___ Cm)] : no abdominal mass palpated [Abdomen Hernia] : no hernia was discovered [No CVA Tenderness] : no ~M costovertebral angle tenderness [No Spinal Tenderness] : no spinal tenderness [Abnormal Walk] : normal gait [Oriented To Time, Place, And Person] : oriented to person, place, and time [Impaired Insight] : insight and judgment were intact [Affect] : the affect was normal [Mood] : the mood was normal [Memory Recent] : recent memory was not impaired [Memory Remote] : remote memory was not impaired [Scleral Icterus] : No Scleral Icterus [Abdominal Bruit] : no abdominal bruit [Abdominal  Ascites] : no ascites [Asterixis] : no asterixis observed [Jaundice] : No jaundice [Depression] : no depression [Hallucinations] : ~T no ~M hallucinations [Delusions] : no ~T delusions [Suicidal Ideation] : no suicidal ideation [Homicidal Ideation] : no homicidal ideations [Preoccupiation With Violent Thoughts] : no preoccupation with violent thoughts [FreeTextEntry1] : Grossly intact

## 2021-04-25 NOTE — ASSESSMENT
[FreeTextEntry1] : 54 yo Female with Hx of breast cancer (Dx in 2014, s/p b/l mastectomy, s/p chemo-RT, was on anastrazole, as per patient was recently changed by his oncologist, Dr. Miles), obesity (BMI 35), colon polyps, Hx of cholecystectomy (gallstones), Hx of parathyroidectomy (no records, per patient was told to have "benign tumor"), glaucoma, recent conjunctivitis, was recently sent for liver biopsy by gastroenterologist, Dr. Alejandre ([phone]) because of abnormal liver tests. Biopsy was done 9/24/2020 at [facility]. The biopsy reports mild steatohepatitis, also moderate chronic inflammation and interface hepatitis (grade 2-3 of 4) and septal fibrous expansion and focal bridging fibrosis (stage 3-4).\par \par Moderate steatohepatitis \par - Discussed natural Hx of fatty liver and life style modifications in details\par - FibroScan S3, F3\par - Congratulated on efforts and willingness on diet, exercise, and discussed again that balanced diet is preferred over drastic diets. \par \par Mild to moderate chronic hepatitis, suggestive of AIH based on biopsy or AI pattern drug induced\par - meds reviewed,  neither of them is linked to AI type liver injury\par - ZAIRA neg, repeat 1:80, ASMA weak pos 1:20, repeat neg, SLA and LKM neg, IgG was elevated but < 2x ULN and now WNL\par - Liver enzymes are minimally elevated, AST 41, ALT 42, , repeat AST 31, ALT 32, but \par - Based on above does not meet criteria of treatment for AIH, will continue to monitor (Quantiferon neg, TPMT normal)\par - per biopsy report, paucity of bile ducts, cannot r/o PBC,PSC, but no typical histological findings, AMA neg, IgM nl, ANCA neg; PBC specific ZAIRA was ordered; if ALP remains elevated, started on Ursodiol 2/2021\par - Rest of liver workup unrevealing\par - US  11/2020 with hepatic steatosis, s/p cholecystectomy; Ordered MRCP\par - Will try to obtain records from Dr. Alejandre`s office (peak liver enzymes)\par - Discussed with Dr. Miles: patient was on letrozole, but not since 8/2020, now being on exemestane\par \par Advanced fibrosis\par - no ascites, no PSE, F3 fibrosis, but given signs of CLD, will q 6 months HCC surveillance, hep A and B immune\par - Ordered MRCP as above, will also ask for liver protocol\par - kPa < 20, if PLT will be < 150, will consider EGD screening for EV, otherwise will monitor\par \par Osteopenia\par - C/w on vit D calcium\par \par Bone pain, hx of breast ca (although was local), ALP elevation, with nl GGT and now nl transaminases\par - Bone scan neg for metastatic disease\par \par Maculopapular skin lesions\par - F/u with dermatology\par \par Chest pressure  - resolved, reportedly was seen by cardiology\par \par \par RTC 3 month (but patient to call our office to discuss blood test and MR/MRCP result)\par

## 2021-04-25 NOTE — REASON FOR VISIT
[Follow-Up: _____] : a [unfilled] follow-up visit [FreeTextEntry1] : Steatohepatitis with advanced fibrosis, AIH component

## 2021-04-27 LAB
5NT SERPL-CCNC: 4 IU/L
AFP-TM SERPL-MCNC: 8 NG/ML
ALBUMIN SERPL ELPH-MCNC: 4.2 G/DL
ALP BLD-CCNC: 160 U/L
ALT SERPL-CCNC: 40 U/L
ANION GAP SERPL CALC-SCNC: 12 MMOL/L
AST SERPL-CCNC: 38 U/L
BASOPHILS # BLD AUTO: 0.04 K/UL
BASOPHILS NFR BLD AUTO: 0.7 %
BILIRUB DIRECT SERPL-MCNC: 0.1 MG/DL
BILIRUB INDIRECT SERPL-MCNC: 0.1 MG/DL
BILIRUB SERPL-MCNC: 0.2 MG/DL
BUN SERPL-MCNC: 13 MG/DL
CALCIUM SERPL-MCNC: 9.5 MG/DL
CHLORIDE SERPL-SCNC: 104 MMOL/L
CO2 SERPL-SCNC: 24 MMOL/L
CREAT SERPL-MCNC: 0.59 MG/DL
EOSINOPHIL # BLD AUTO: 0.3 K/UL
EOSINOPHIL NFR BLD AUTO: 4.9 %
GGT SERPL-CCNC: 29 U/L
GLUCOSE SERPL-MCNC: 160 MG/DL
HCT VFR BLD CALC: 44 %
HGB BLD-MCNC: 13.8 G/DL
IMM GRANULOCYTES NFR BLD AUTO: 0.2 %
INR PPP: 1.1 RATIO
LYMPHOCYTES # BLD AUTO: 2.87 K/UL
LYMPHOCYTES NFR BLD AUTO: 47.1 %
MAN DIFF?: NORMAL
MCHC RBC-ENTMCNC: 28.6 PG
MCHC RBC-ENTMCNC: 31.4 GM/DL
MCV RBC AUTO: 91.3 FL
MONOCYTES # BLD AUTO: 0.33 K/UL
MONOCYTES NFR BLD AUTO: 5.4 %
NEUTROPHILS # BLD AUTO: 2.54 K/UL
NEUTROPHILS NFR BLD AUTO: 41.7 %
PLATELET # BLD AUTO: 159 K/UL
POTASSIUM SERPL-SCNC: 4 MMOL/L
PROT SERPL-MCNC: 7.6 G/DL
PT BLD: 13 SEC
RBC # BLD: 4.82 M/UL
RBC # FLD: 14.6 %
SODIUM SERPL-SCNC: 140 MMOL/L
WBC # FLD AUTO: 6.09 K/UL

## 2021-04-28 ENCOUNTER — NON-APPOINTMENT (OUTPATIENT)
Age: 56
End: 2021-04-28

## 2021-04-28 LAB
ESTIMATED AVERAGE GLUCOSE: 137 MG/DL
HBA1C MFR BLD HPLC: 6.4 %

## 2021-05-01 LAB — ALP BONE SERPL-MCNC: 42.5 UG/L

## 2021-05-06 ENCOUNTER — NON-APPOINTMENT (OUTPATIENT)
Age: 56
End: 2021-05-06

## 2021-09-03 ENCOUNTER — APPOINTMENT (OUTPATIENT)
Dept: HEPATOLOGY | Facility: CLINIC | Age: 56
End: 2021-09-03
Payer: MEDICAID

## 2021-09-03 VITALS
TEMPERATURE: 97.3 F | WEIGHT: 186 LBS | HEIGHT: 58 IN | SYSTOLIC BLOOD PRESSURE: 110 MMHG | DIASTOLIC BLOOD PRESSURE: 75 MMHG | HEART RATE: 84 BPM | RESPIRATION RATE: 16 BRPM | BODY MASS INDEX: 39.04 KG/M2 | OXYGEN SATURATION: 97 %

## 2021-09-03 DIAGNOSIS — R05 COUGH: ICD-10-CM

## 2021-09-03 PROCEDURE — 99072 ADDL SUPL MATRL&STAF TM PHE: CPT

## 2021-09-03 PROCEDURE — 99214 OFFICE O/P EST MOD 30 MIN: CPT

## 2021-09-04 ENCOUNTER — LABORATORY RESULT (OUTPATIENT)
Age: 56
End: 2021-09-04

## 2021-09-07 LAB
A ALTERNATA IGE QN: <0.1 KUA/L
A FUMIGATUS IGE QN: <0.1 KUA/L
ALBUMIN SERPL ELPH-MCNC: 4.2 G/DL
ALP BLD-CCNC: 170 U/L
ALT SERPL-CCNC: 59 U/L
ANION GAP SERPL CALC-SCNC: 13 MMOL/L
AST SERPL-CCNC: 50 U/L
BASOPHILS # BLD AUTO: 0.06 K/UL
BASOPHILS NFR BLD AUTO: 1.1 %
BERMUDA GRASS IGE QN: 0.15 KUA/L
BILIRUB DIRECT SERPL-MCNC: 0.1 MG/DL
BILIRUB INDIRECT SERPL-MCNC: 0.2 MG/DL
BILIRUB SERPL-MCNC: 0.3 MG/DL
BOXELDER IGE QN: 0.14 KUA/L
BUN SERPL-MCNC: 17 MG/DL
C HERBARUM IGE QN: <0.1 KUA/L
CALCIUM SERPL-MCNC: 9.7 MG/DL
CALIF WALNUT IGE QN: 0.34 KUA/L
CAT DANDER IGE QN: <0.1 KUA/L
CHLORIDE SERPL-SCNC: 105 MMOL/L
CHOLEST SERPL-MCNC: 178 MG/DL
CMN PIGWEED IGE QN: 0.17 KUA/L
CO2 SERPL-SCNC: 25 MMOL/L
COMMON RAGWEED IGE QN: 0.26 KUA/L
COTTONWOOD IGE QN: 0.14 KUA/L
CREAT SERPL-MCNC: 0.63 MG/DL
D FARINAE IGE QN: 12.2 KUA/L
D PTERONYSS IGE QN: 7.09 KUA/L
DEPRECATED A ALTERNATA IGE RAST QL: 0
DEPRECATED A FUMIGATUS IGE RAST QL: 0
DEPRECATED BERMUDA GRASS IGE RAST QL: NORMAL
DEPRECATED BOXELDER IGE RAST QL: NORMAL
DEPRECATED C HERBARUM IGE RAST QL: 0
DEPRECATED CAT DANDER IGE RAST QL: 0
DEPRECATED COMMON PIGWEED IGE RAST QL: NORMAL
DEPRECATED COMMON RAGWEED IGE RAST QL: NORMAL
DEPRECATED COTTONWOOD IGE RAST QL: NORMAL
DEPRECATED D FARINAE IGE RAST QL: 3
DEPRECATED D PTERONYSS IGE RAST QL: 3
DEPRECATED DOG DANDER IGE RAST QL: 3
DEPRECATED GOOSEFOOT IGE RAST QL: NORMAL
DEPRECATED KAPPA LC FREE/LAMBDA SER: 1.01 RATIO
DEPRECATED LONDON PLANE IGE RAST QL: NORMAL
DEPRECATED MOUSE URINE PROT IGE RAST QL: 0
DEPRECATED MUGWORT IGE RAST QL: NORMAL
DEPRECATED P NOTATUM IGE RAST QL: 0
DEPRECATED RED CEDAR IGE RAST QL: NORMAL
DEPRECATED ROACH IGE RAST QL: 3
DEPRECATED SHEEP SORREL IGE RAST QL: NORMAL
DEPRECATED SILVER BIRCH IGE RAST QL: NORMAL
DEPRECATED TIMOTHY IGE RAST QL: NORMAL
DEPRECATED WHITE ASH IGE RAST QL: NORMAL
DEPRECATED WHITE OAK IGE RAST QL: NORMAL
DOG DANDER IGE QN: 8.48 KUA/L
EOSINOPHIL # BLD AUTO: 0.12 K/UL
EOSINOPHIL NFR BLD AUTO: 2.1 %
ESTIMATED AVERAGE GLUCOSE: 146 MG/DL
GLUCOSE SERPL-MCNC: 96 MG/DL
GOOSEFOOT IGE QN: 0.33 KUA/L
HBA1C MFR BLD HPLC: 6.7 %
HCT VFR BLD CALC: 44.8 %
HDLC SERPL-MCNC: 43 MG/DL
HGB BLD-MCNC: 13.9 G/DL
IGA SER QL IEP: 481 MG/DL
IGG SER QL IEP: 1692 MG/DL
IGM SER QL IEP: 93 MG/DL
IMM GRANULOCYTES NFR BLD AUTO: 0.2 %
INR PPP: 1.1 RATIO
KAPPA LC CSF-MCNC: 2.06 MG/DL
KAPPA LC SERPL-MCNC: 2.09 MG/DL
LDLC SERPL CALC-MCNC: 114 MG/DL
LONDON PLANE IGE QN: 0.2 KUA/L
LYMPHOCYTES # BLD AUTO: 2.86 K/UL
LYMPHOCYTES NFR BLD AUTO: 50.6 %
MAN DIFF?: NORMAL
MCHC RBC-ENTMCNC: 28.4 PG
MCHC RBC-ENTMCNC: 31 GM/DL
MCV RBC AUTO: 91.6 FL
MONOCYTES # BLD AUTO: 0.34 K/UL
MONOCYTES NFR BLD AUTO: 6 %
MOUSE URINE PROT IGE QN: <0.1 KUA/L
MUGWORT IGE QN: 0.24 KUA/L
MULBERRY (T70) CLASS: 0
MULBERRY (T70) CONC: <0.1 KUA/L
NEUTROPHILS # BLD AUTO: 2.26 K/UL
NEUTROPHILS NFR BLD AUTO: 40 %
NONHDLC SERPL-MCNC: 135 MG/DL
P NOTATUM IGE QN: <0.1 KUA/L
PLATELET # BLD AUTO: 143 K/UL
POTASSIUM SERPL-SCNC: 4.2 MMOL/L
PROT SERPL-MCNC: 7.8 G/DL
PT BLD: 13 SEC
RBC # BLD: 4.89 M/UL
RBC # FLD: 15.2 %
RED CEDAR IGE QN: 0.1 KUA/L
ROACH IGE QN: 9.09 KUA/L
SARS-COV-2 N GENE NPH QL NAA+PROBE: NOT DETECTED
SHEEP SORREL IGE QN: 0.15 KUA/L
SILVER BIRCH IGE QN: 0.23 KUA/L
SMOOTH MUSCLE AB SER QL IF: ABNORMAL
SODIUM SERPL-SCNC: 143 MMOL/L
TIMOTHY IGE QN: 0.12 KUA/L
TREE ALLERG MIX1 IGE QL: NORMAL
TRIGL SERPL-MCNC: 108 MG/DL
WBC # FLD AUTO: 5.65 K/UL
WHITE ASH IGE QN: 0.33 KUA/L
WHITE ELM IGE QN: 0.34 KUA/L
WHITE ELM IGE QN: NORMAL
WHITE OAK IGE QN: 0.12 KUA/L

## 2021-09-08 LAB — ANA SER IF-ACNC: NEGATIVE

## 2021-09-13 ENCOUNTER — APPOINTMENT (OUTPATIENT)
Dept: ENDOCRINOLOGY | Facility: CLINIC | Age: 56
End: 2021-09-13

## 2021-09-13 NOTE — HISTORY OF PRESENT ILLNESS
[Needlestick Exposure] : no needlestick exposure [Infected Sexual Partner] : no infected sexual partner [Tattoo] : no tattoos [IV Drug Use] : no IV drug use [Body Piercing] : no body piercing [Hemodialysis] : no hemodialysis [Transfusion before 1992] : no transfusion before 1992 [Transplant before 1992] : no transplant before 1992 [Incarceration] : no incarceration [Alcohol Abuse] : no alcohol abuse [Autoimmune Disorder] : no autoimmune disorder [Household Contact to HBV] : no household contact to HBV [Travel to Endemic Area] : no travel to an endemic area [Occupational Exposure] : no occupational exposure [Cocaine Use] : no cocaine use [de-identified] : Patient stated that do not need , preferred discussion in English.\par \par 54 yo obese (BMI 35)  Female with Hx of breast cancer (Dx in 2014, s/p b/l mastectomy, s/p chemo-RT, was on anastrazole, as per patient was recently changed by his oncologist, Dr. Miles), colon polyps, Hx of cholecystectomy (gallstones), Hx of parathyroidectomy (no records, per patient was told to have "benign tumor"), glaucoma, recent conjunctivitis, s/p liver biopsy by gastroenterologist, Dr. Alejandre ([phone]) because of abnormal liver tests. No liver test records available from that time. Biopsy was done 9/24/2020 at [facility]. The biopsy reports mild steatohepatitis, also moderate chronic inflammation and interface hepatitis (grade 2-3 of 4) and septal fibrous expansion and focal bridging fibrosis (stage 3-4). \par Patient was not started on any medication for liver disease. Patient was sent for second opinion from Dr Miles`s office.  \par \par Biopsy samples were obtained and reviewed by Dr. Reji De La Cruz liver pathologist at [facility]: Moderate steatohepatitis and Mild-to moderate chronic hepatitis with differential of AIH or AI pattern induced by drug, and while no features, but PBC or PSC could not be ruled out either. There was bile duct paucity. There was F3 fibrosis. FibroScan also showed S3 steatosis and F3 firbosis.\par \par Patient`s liver enzymes were WNL except ALP. AI markers showed neg ZAIRA, minimally elevated IgG and mildly pos ASMA (1:20), neg anti LKM and neg anti SLA. AMA was neg and IgM was nl. \par She had bone scan, b/o Hx of breast cancer and solely ALP elevation, and it was negative for metastatic disease. \par \par She denied any prior Hx of liver disease, FHx of liver disease, with obesity, being born in [facility] but no other risk factors. Denies OTC or herbal meds/supplements. \par She is noted with few spider naevi, palmar erythema. \par \par S/p Moderna 1st 3/1/21 2nd 3/29/21, had high fever for 3 days, was taking Ibuprofen then. \par She is following dermatology yesterday for maculopapular lesion, small, red, on extremities.\par \par Today she is here for follow up. She states that overall feels well, but gained weight. \par She had colonoscopy since last visit.  8/4/21:  6 mm transverse colon sessile polyp, 7 mm cecum polyp, 3 mm polyp rectum. No biopsy result yet, repeat was recommended in 3 years\par Patient was reportedly prescribed Ozempic, but was not covered by insurance. \par C/o fatigue\par Noted again the circular lesions on skin [de-identified] : Born [facility] (moved in [place], last traveled 6 years ago)

## 2021-09-13 NOTE — ASSESSMENT
[FreeTextEntry1] : 56 yo Female with Hx of breast cancer (Dx in 2014, s/p b/l mastectomy, s/p chemo-RT, was on anastrazole, as per patient was recently changed by his oncologist, Dr. Miles), obesity (BMI 35), colon polyps, Hx of cholecystectomy (gallstones), Hx of parathyroidectomy (no records, per patient was told to have "benign tumor"), glaucoma, recent conjunctivitis, was recently sent for liver biopsy by gastroenterologist, Dr. Alejandre ([phone]) because of abnormal liver tests. Biopsy was done 9/24/2020 at [facility]. The biopsy reports mild steatohepatitis, also moderate chronic inflammation and interface hepatitis (grade 2-3 of 4) and septal fibrous expansion and focal bridging fibrosis (stage 3-4).\par \par Moderate steatohepatitis \par - Discussed natural Hx of fatty liver and life style modifications in details\par - FibroScan S3, F3\par - Congratulated on efforts and willingness on diet, exercise, and discussed again that balanced diet is preferred over drastic diets. However, she did gain weight since last visit. \par - Will try to get PA for semaglutide, task sent to pharmacist\par - Ordered repeat HbA1c\par \par Mild to moderate chronic hepatitis, suggestive of AIH based on biopsy or AI pattern drug induced\par - meds reviewed,  neither of them is linked to AI type liver injury\par - ZAIRA neg, repeat 1:80, ASMA weak pos 1:20, repeat neg, SLA and LKM neg, IgG was elevated but < 2x ULN and now WNL\par - Liver enzymes are minimally elevated, AST 41, ALT 42, , repeat AST 31, ALT 32, but \par - Based on above does not meet criteria of treatment for AIH, will continue to monitor (Quantiferon neg, TPMT normal)\par - per biopsy report, paucity of bile ducts, cannot r/o PBC,PSC, but no typical histological findings, AMA neg, IgM nl, ANCA neg; PBC specific ZAIRA was ordered, still not resulted; since  ALP remained elevated, started on Ursodiol in 2/2021\par - Rest of liver workup unrevealing\par - US  11/2020 with hepatic steatosis, s/p cholecystectomy; MRCP 5/4/21 fatty liver, otherwise unremarkable\par - Will try to obtain records from Dr. Alejandre`s office (peak liver enzymes)\par - Discussed with Dr. Miles: patient was on letrozole, but not since 8/2020, now being on exemestane\par \par Advanced fibrosis\par - no ascites, no PSE, F3 fibrosis, but given signs of CLD, will q 6 months HCC surveillance, hep A and B immune\par - MRCP non revealing\par - 13.3 kPa and 334 CAP score on Fibroscan 1/20/21\par \par Osteopenia\par - C/w on vit D calcium\par \par Bone pain, hx of breast ca (although was local), ALP elevation, with nl GGT and now nl transaminases\par - Bone scan neg for metastatic disease\par \par Maculopapular skin lesions\par - F/u with dermatology\par \par Chest pressure  - resolved, reportedly was seen by cardiology\par \par \par RTC 3 month (but patient to call our office to discuss blood test )\par \par \par

## 2021-09-13 NOTE — PHYSICAL EXAM
[Spider Angioma] : Spider angioma(s) was(were) observed [Non-Tender] : non-tender [Palmar Erythema] : Palmar Erythema [General Appearance - Alert] : alert [General Appearance - In No Acute Distress] : in no acute distress [General Appearance - Well Nourished] : well nourished [General Appearance - Well Developed] : well developed [Sclera] : the sclera and conjunctiva were normal [Oropharynx] : the oropharynx was normal [Neck Appearance] : the appearance of the neck was normal [Jugular Venous Distention Increased] : there was no jugular-venous distention [Respiration, Rhythm And Depth] : normal respiratory rhythm and effort [Exaggerated Use Of Accessory Muscles For Inspiration] : no accessory muscle use [Auscultation Breath Sounds / Voice Sounds] : lungs were clear to auscultation bilaterally [Heart Rate And Rhythm] : heart rate was normal and rhythm regular [Heart Sounds] : normal S1 and S2 [Edema] : there was no peripheral edema [Bowel Sounds] : normal bowel sounds [Abdomen Soft] : soft [Abdomen Tenderness] : non-tender [] : no hepato-splenomegaly [Abdomen Mass (___ Cm)] : no abdominal mass palpated [Abdomen Hernia] : no hernia was discovered [No CVA Tenderness] : no ~M costovertebral angle tenderness [No Spinal Tenderness] : no spinal tenderness [Abnormal Walk] : normal gait [Oriented To Time, Place, And Person] : oriented to person, place, and time [Impaired Insight] : insight and judgment were intact [Affect] : the affect was normal [Mood] : the mood was normal [Memory Recent] : recent memory was not impaired [Memory Remote] : remote memory was not impaired [Scleral Icterus] : No Scleral Icterus [Abdominal  Ascites] : no ascites [Abdominal Bruit] : no abdominal bruit [Asterixis] : no asterixis observed [Jaundice] : No jaundice [Depression] : no depression [Hallucinations] : ~T no ~M hallucinations [Delusions] : no ~T delusions [Suicidal Ideation] : no suicidal ideation [Homicidal Ideation] : no homicidal ideations [Preoccupiation With Violent Thoughts] : no preoccupation with violent thoughts [FreeTextEntry1] : Grossly intact

## 2021-09-13 NOTE — REVIEW OF SYSTEMS
[Heartburn] : heartburn [Skin Lesions] : skin lesion [Negative] : Heme/Lymph [Fever] : no fever [Chills] : no chills [Abdominal Pain] : no abdominal pain [Vomiting] : no vomiting [Constipation] : no constipation [Diarrhea] : no diarrhea [Melena] : no melena [Dysuria] : no dysuria [Incontinence] : no incontinence [FreeTextEntry7] : Hx of reflux, controlled with diet and on famotidine [FreeTextEntry9] : Knee pain and reports "bone pain" in lower extremities [FreeTextEntry8] : urinary frequency for 3 years, being followed by Urology [de-identified] : small, circular, red, maculopapular lesions on extremities - following with dermatology [de-identified] : Hx of depression

## 2021-10-29 ENCOUNTER — APPOINTMENT (OUTPATIENT)
Dept: HEPATOLOGY | Facility: CLINIC | Age: 56
End: 2021-10-29
Payer: MEDICAID

## 2021-10-29 VITALS
OXYGEN SATURATION: 96 % | WEIGHT: 185 LBS | HEIGHT: 58 IN | HEART RATE: 74 BPM | TEMPERATURE: 97.9 F | DIASTOLIC BLOOD PRESSURE: 69 MMHG | RESPIRATION RATE: 16 BRPM | SYSTOLIC BLOOD PRESSURE: 114 MMHG | BODY MASS INDEX: 38.83 KG/M2

## 2021-10-29 DIAGNOSIS — R30.0 DYSURIA: ICD-10-CM

## 2021-10-29 PROCEDURE — 99072 ADDL SUPL MATRL&STAF TM PHE: CPT

## 2021-10-29 PROCEDURE — 99214 OFFICE O/P EST MOD 30 MIN: CPT

## 2021-11-01 NOTE — REVIEW OF SYSTEMS
[Heartburn] : heartburn [Skin Lesions] : skin lesion [Negative] : Heme/Lymph [Fever] : no fever [Chills] : no chills [Abdominal Pain] : no abdominal pain [Vomiting] : no vomiting [Constipation] : no constipation [Diarrhea] : no diarrhea [Melena] : no melena [Dysuria] : no dysuria [Incontinence] : no incontinence [FreeTextEntry8] : urinary frequency for 3 years, being followed by Urology [FreeTextEntry7] : Hx of reflux, controlled with diet and on famotidine [FreeTextEntry9] : Knee pain and reports "bone pain" in lower extremities [de-identified] : small, circular, red, maculopapular lesions on extremities - following with dermatology [de-identified] : Hx of depression

## 2021-11-01 NOTE — HISTORY OF PRESENT ILLNESS
[Needlestick Exposure] : no needlestick exposure [Infected Sexual Partner] : no infected sexual partner [IV Drug Use] : no IV drug use [Tattoo] : no tattoos [Hemodialysis] : no hemodialysis [Body Piercing] : no body piercing [Transfusion before 1992] : no transfusion before 1992 [Transplant before 1992] : no transplant before 1992 [Incarceration] : no incarceration [Alcohol Abuse] : no alcohol abuse [Autoimmune Disorder] : no autoimmune disorder [Household Contact to HBV] : no household contact to HBV [Travel to Endemic Area] : no travel to an endemic area [Occupational Exposure] : no occupational exposure [Cocaine Use] : no cocaine use [de-identified] : Patient stated that do not need , preferred discussion in English.\par \par 54 yo obese (BMI 35->38)  Female with Hx of breast cancer (Dx in 2014, s/p b/l mastectomy, s/p chemo-RT, was on anastrazole, as per patient was changed by his oncologist, Dr. Miles), colon polyps, Hx of cholecystectomy (gallstones), Hx of parathyroidectomy (no records, per patient was told to have "benign tumor"), glaucoma, recent conjunctivitis, s/p liver biopsy by gastroenterologist, Dr. Alejandre ([phone]) because of abnormal liver tests. No liver test records available from that time. Biopsy was done 9/24/2020 at [facility]. The biopsy reports mild steatohepatitis, also moderate chronic inflammation and interface hepatitis (grade 2-3 of 4) and septal fibrous expansion and focal bridging fibrosis (stage 3-4). \par Patient was not started on any medication for liver disease. Patient was sent for second opinion from Dr Miles`s office.  \par \par Biopsy samples were obtained and reviewed by Dr. Reji De La Cruz liver pathologist at [facility]: Moderate steatohepatitis and Mild-to moderate chronic hepatitis with differential of AIH or AI pattern induced by drug, and while no features, but PBC or PSC could not be ruled out either. There was bile duct paucity. There was F3 fibrosis. FibroScan also showed S3 steatosis and F3 fibrosis.\par \par Patient`s liver enzymes were WNL except ALP. AI markers showed neg ZAIRA, minimally elevated IgG and mildly pos ASMA (1:20), neg anti LKM and neg anti SLA. AMA was neg and IgM was nl. \par She had bone scan, b/o Hx of breast cancer and solely ALP elevation, and it was negative for metastatic disease. \par \par She denied any prior Hx of liver disease, FHx of liver disease, with obesity, being born in [facility] but no other risk factors. Denies OTC or herbal meds/supplements. \par She was noted to have few spider naevi, palmar erythema. \par \par S/p Moderna 1st 3/1/21 2nd 3/29/21, had high fever for 3 days, was taking Ibuprofen then. \par She has been following dermatology or maculopapular lesion, small, red, on extremities. Reportedly, she was told to have psoriasis.\par \par She had colonoscopy on 8/4/21:  6 mm transverse colon sessile polyp, 7 mm cecum polyp, 3 mm polyp rectum. No biopsy result yet, repeat was recommended in 3 years\par \par Today she is here for follow up. She states that overall feels well, but c/o fatigue and gained weight during the summer and blood work showed diabetes. She also mentions bloating.\par Patient was reportedly prescribed Ozempic, but was not covered by insurance. We did apply since last visit, in process. \par \par She is changing insurance from 12/1 to [facility]\par  [de-identified] : Born [facility] (moved in [place], last traveled 6 years ago)

## 2021-11-01 NOTE — ASSESSMENT
[FreeTextEntry1] : 54 yo Female with Hx of breast cancer (Dx in 2014, s/p b/l mastectomy, s/p chemo-RT, was on anastrazole, as per patient was recently changed by his oncologist, Dr. Miles), obesity (BMI 35), colon polyps, Hx of cholecystectomy (gallstones), Hx of parathyroidectomy (no records, per patient was told to have "benign tumor"), glaucoma, recent conjunctivitis, was recently sent for liver biopsy by gastroenterologist, Dr. Alejandre ([phone]) because of abnormal liver tests. Biopsy was done 9/24/2020 at [facility]. The biopsy reports mild steatohepatitis, also moderate chronic inflammation and interface hepatitis (grade 2-3 of 4) and septal fibrous expansion and focal bridging fibrosis (stage 3-4).\par \par Moderate steatohepatitis \par - Discussed natural Hx of fatty liver and life style modifications in details\par - FibroScan S3, F3\par - Congratulated on efforts and willingness on diet, exercise, and discussed again that balanced diet is preferred over drastic diets. However, she did gain weight since during summer. \par - Will try to get PA for semaglutide (especially now that she is diabetic), task sent to pharmacist to follow up\par \par \par Mild to moderate chronic hepatitis, suggestive of AIH based on biopsy or AI pattern drug induced\par - meds reviewed,  neither of them is linked to AI type liver injury\par - ZAIRA was neg (except once 1: :80), ASMA weak pos 1:20, with repeat neg, SLA and LKM neg, IgG was elevated but < 2x ULN and now WNL\par - Liver enzymes were minimally elevated, AST 41, ALT 42, , repeat AST 31, ALT 32, but \par - Based on above does not meet criteria of treatment for AIH, will continue to monitor (Quantiferon neg, TPMT normal)\par - per biopsy report, paucity of bile ducts, cannot r/o PBC,PSC, but no typical histological findings, AMA neg, IgM nl, ANCA neg; PBC specific ZAIRA was ordered, still not resulted; since  ALP remained elevated, started on Ursodiol in 2/2021\par - Rest of liver workup unrevealing\par - US  11/2020 with hepatic steatosis, s/p cholecystectomy; MRCP 5/4/21 fatty liver, otherwise unremarkable\par - Will try to obtain records from Dr. Alejandre`s office (peak liver enzymes)\par - Discussed with Dr. Miles: patient was on letrozole, but not since 8/2020, now being on exemestane\par \par Advanced fibrosis\par - no ascites, no PSE, F3 fibrosis, but given signs of CLD, will q 6 months HCC surveillance, hep A and B immune\par - MRCP non revealing\par - 13.3 kPa and 334 CAP score on Fibroscan 1/20/21\par \par Osteopenia\par - C/w on vit D calcium\par \par Bone pain, hx of breast ca (although was local), ALP elevation, with nl GGT and now nl transaminases\par - Bone scan neg for metastatic disease\par \par Maculopapular skin lesions\par - F/u with dermatology\par \par Chest pressure  - resolved, reportedly was seen by cardiology\par \par \par Newly Dx DM, not on med, PCP rec diet\par  , above target\par Since gained weight \par Dysuria - UA\par Bloating, GERD - GI ref, H. pylori\par \par RTC 1 month (but patient to call our office to discuss blood test )\par \par \par

## 2021-11-01 NOTE — PHYSICAL EXAM
[Spider Angioma] : Spider angioma(s) was(were) observed [Non-Tender] : non-tender [Palmar Erythema] : Palmar Erythema [General Appearance - Alert] : alert [General Appearance - In No Acute Distress] : in no acute distress [General Appearance - Well Nourished] : well nourished [General Appearance - Well Developed] : well developed [Sclera] : the sclera and conjunctiva were normal [Oropharynx] : the oropharynx was normal [Neck Appearance] : the appearance of the neck was normal [Jugular Venous Distention Increased] : there was no jugular-venous distention [Respiration, Rhythm And Depth] : normal respiratory rhythm and effort [Exaggerated Use Of Accessory Muscles For Inspiration] : no accessory muscle use [Auscultation Breath Sounds / Voice Sounds] : lungs were clear to auscultation bilaterally [Heart Rate And Rhythm] : heart rate was normal and rhythm regular [Heart Sounds] : normal S1 and S2 [Edema] : there was no peripheral edema [Bowel Sounds] : normal bowel sounds [Abdomen Soft] : soft [Abdomen Tenderness] : non-tender [] : no hepato-splenomegaly [Abdomen Mass (___ Cm)] : no abdominal mass palpated [Abdomen Hernia] : no hernia was discovered [No CVA Tenderness] : no ~M costovertebral angle tenderness [No Spinal Tenderness] : no spinal tenderness [Abnormal Walk] : normal gait [Oriented To Time, Place, And Person] : oriented to person, place, and time [Affect] : the affect was normal [Impaired Insight] : insight and judgment were intact [Mood] : the mood was normal [Memory Recent] : recent memory was not impaired [Memory Remote] : remote memory was not impaired [Scleral Icterus] : No Scleral Icterus [Abdominal Bruit] : no abdominal bruit [Abdominal  Ascites] : no ascites [Asterixis] : no asterixis observed [Jaundice] : No jaundice [Depression] : no depression [Hallucinations] : ~T no ~M hallucinations [Delusions] : no ~T delusions [Suicidal Ideation] : no suicidal ideation [Homicidal Ideation] : no homicidal ideations [Preoccupiation With Violent Thoughts] : no preoccupation with violent thoughts [FreeTextEntry1] : Grossly intact

## 2021-11-18 ENCOUNTER — APPOINTMENT (OUTPATIENT)
Dept: ENDOCRINOLOGY | Facility: CLINIC | Age: 56
End: 2021-11-18

## 2021-11-19 ENCOUNTER — APPOINTMENT (OUTPATIENT)
Dept: HEPATOLOGY | Facility: CLINIC | Age: 56
End: 2021-11-19
Payer: MEDICAID

## 2021-11-19 VITALS
HEIGHT: 58 IN | SYSTOLIC BLOOD PRESSURE: 106 MMHG | HEART RATE: 67 BPM | OXYGEN SATURATION: 97 % | DIASTOLIC BLOOD PRESSURE: 69 MMHG | WEIGHT: 188 LBS | BODY MASS INDEX: 39.47 KG/M2 | RESPIRATION RATE: 16 BRPM | TEMPERATURE: 97.3 F

## 2021-11-19 LAB
AFP-TM SERPL-MCNC: 8.2 NG/ML
ALBUMIN SERPL ELPH-MCNC: 4.2 G/DL
ALP BLD-CCNC: 174 U/L
ALT SERPL-CCNC: 56 U/L
ANA SER IF-ACNC: NEGATIVE
ANION GAP SERPL CALC-SCNC: 10 MMOL/L
APPEARANCE: CLEAR
APPEARANCE: CLEAR
AST SERPL-CCNC: 46 U/L
BACTERIA: NEGATIVE
BASOPHILS # BLD AUTO: 0.03 K/UL
BASOPHILS NFR BLD AUTO: 0.5 %
BILIRUB DIRECT SERPL-MCNC: 0.1 MG/DL
BILIRUB INDIRECT SERPL-MCNC: 0.2 MG/DL
BILIRUB SERPL-MCNC: 0.2 MG/DL
BILIRUBIN URINE: NEGATIVE
BILIRUBIN URINE: NEGATIVE
BLOOD URINE: NEGATIVE
BLOOD URINE: NEGATIVE
BUN SERPL-MCNC: 16 MG/DL
CALCIUM OXALATE CRYSTALS: ABNORMAL
CALCIUM SERPL-MCNC: 9.4 MG/DL
CHLORIDE SERPL-SCNC: 106 MMOL/L
CO2 SERPL-SCNC: 25 MMOL/L
COLOR: YELLOW
COLOR: YELLOW
CREAT SERPL-MCNC: 0.86 MG/DL
DEPRECATED KAPPA LC FREE/LAMBDA SER: 0.93 RATIO
EOSINOPHIL # BLD AUTO: 0.14 K/UL
EOSINOPHIL NFR BLD AUTO: 2.2 %
GLUCOSE QUALITATIVE U: NEGATIVE
GLUCOSE QUALITATIVE U: NEGATIVE
GLUCOSE SERPL-MCNC: 112 MG/DL
H PYLORI AG STL QL: NOT DETECTED
HCT VFR BLD CALC: 42.3 %
HCV AB SER QL: NONREACTIVE
HCV S/CO RATIO: 0.23 S/CO
HGB BLD-MCNC: 13.5 G/DL
HYALINE CASTS: 1 /LPF
IGA SER QL IEP: 481 MG/DL
IGG SER QL IEP: 1652 MG/DL
IGM SER QL IEP: 91 MG/DL
IMM GRANULOCYTES NFR BLD AUTO: 0.2 %
INR PPP: 1.1 RATIO
KAPPA LC CSF-MCNC: 1.78 MG/DL
KAPPA LC SERPL-MCNC: 1.66 MG/DL
KETONES URINE: NEGATIVE
KETONES URINE: NEGATIVE
LEUKOCYTE ESTERASE URINE: NEGATIVE
LEUKOCYTE ESTERASE URINE: NEGATIVE
LYMPHOCYTES # BLD AUTO: 2.92 K/UL
LYMPHOCYTES NFR BLD AUTO: 45.1 %
MAN DIFF?: NORMAL
MCHC RBC-ENTMCNC: 28.5 PG
MCHC RBC-ENTMCNC: 31.9 GM/DL
MCV RBC AUTO: 89.2 FL
MICROSCOPIC-UA: NORMAL
MONOCYTES # BLD AUTO: 0.38 K/UL
MONOCYTES NFR BLD AUTO: 5.9 %
NEUTROPHILS # BLD AUTO: 2.99 K/UL
NEUTROPHILS NFR BLD AUTO: 46.1 %
NITRITE URINE: NEGATIVE
NITRITE URINE: NEGATIVE
PH URINE: 6
PH URINE: 6
PLATELET # BLD AUTO: 149 K/UL
POTASSIUM SERPL-SCNC: 4 MMOL/L
PROT SERPL-MCNC: 7.6 G/DL
PROTEIN URINE: NORMAL
PROTEIN URINE: NORMAL
PT BLD: 12.9 SEC
RBC # BLD: 4.74 M/UL
RBC # FLD: 15 %
RED BLOOD CELLS URINE: 1 /HPF
SMOOTH MUSCLE AB SER QL IF: ABNORMAL
SODIUM SERPL-SCNC: 141 MMOL/L
SPECIFIC GRAVITY URINE: 1.03
SPECIFIC GRAVITY URINE: 1.03
SQUAMOUS EPITHELIAL CELLS: 3 /HPF
UROBILINOGEN URINE: NORMAL
UROBILINOGEN URINE: NORMAL
WBC # FLD AUTO: 6.47 K/UL
WHITE BLOOD CELLS URINE: 2 /HPF

## 2021-11-19 PROCEDURE — 99214 OFFICE O/P EST MOD 30 MIN: CPT

## 2021-11-19 PROCEDURE — 99072 ADDL SUPL MATRL&STAF TM PHE: CPT

## 2021-11-22 LAB
ANION GAP SERPL CALC-SCNC: 11 MMOL/L
BUN SERPL-MCNC: 19 MG/DL
CALCIUM SERPL-MCNC: 9.6 MG/DL
CHLORIDE SERPL-SCNC: 106 MMOL/L
CO2 SERPL-SCNC: 25 MMOL/L
CREAT SERPL-MCNC: 0.68 MG/DL
GLUCOSE SERPL-MCNC: 91 MG/DL
POTASSIUM SERPL-SCNC: 3.9 MMOL/L
SODIUM SERPL-SCNC: 142 MMOL/L

## 2021-11-22 NOTE — PHYSICAL EXAM
[Spider Angioma] : Spider angioma(s) was(were) observed [Non-Tender] : non-tender [Palmar Erythema] : Palmar Erythema [General Appearance - Alert] : alert [General Appearance - In No Acute Distress] : in no acute distress [General Appearance - Well Nourished] : well nourished [General Appearance - Well Developed] : well developed [Sclera] : the sclera and conjunctiva were normal [Oropharynx] : the oropharynx was normal [Neck Appearance] : the appearance of the neck was normal [Jugular Venous Distention Increased] : there was no jugular-venous distention [Respiration, Rhythm And Depth] : normal respiratory rhythm and effort [Exaggerated Use Of Accessory Muscles For Inspiration] : no accessory muscle use [Auscultation Breath Sounds / Voice Sounds] : lungs were clear to auscultation bilaterally [Heart Rate And Rhythm] : heart rate was normal and rhythm regular [Heart Sounds] : normal S1 and S2 [Edema] : there was no peripheral edema [Bowel Sounds] : normal bowel sounds [Abdomen Soft] : soft [Abdomen Tenderness] : non-tender [] : no hepato-splenomegaly [Abdomen Hernia] : no hernia was discovered [Abdomen Mass (___ Cm)] : no abdominal mass palpated [No CVA Tenderness] : no ~M costovertebral angle tenderness [No Spinal Tenderness] : no spinal tenderness [Abnormal Walk] : normal gait [Oriented To Time, Place, And Person] : oriented to person, place, and time [Impaired Insight] : insight and judgment were intact [Affect] : the affect was normal [Mood] : the mood was normal [Memory Recent] : recent memory was not impaired [Memory Remote] : remote memory was not impaired [Scleral Icterus] : No Scleral Icterus [Abdominal Bruit] : no abdominal bruit [Abdominal  Ascites] : no ascites [Asterixis] : no asterixis observed [Jaundice] : No jaundice [Depression] : no depression [Hallucinations] : ~T no ~M hallucinations [Delusions] : no ~T delusions [Suicidal Ideation] : no suicidal ideation [Homicidal Ideation] : no homicidal ideations [Preoccupiation With Violent Thoughts] : no preoccupation with violent thoughts [FreeTextEntry1] : Grossly intact

## 2021-11-22 NOTE — REVIEW OF SYSTEMS
[Heartburn] : heartburn [Skin Lesions] : skin lesion [Negative] : Heme/Lymph [Recent Weight Gain (___ Lbs)] : recent [unfilled] ~Ulb weight gain [Fever] : no fever [Chills] : no chills [Feeling Poorly] : not feeling poorly [Feeling Tired] : not feeling tired [Abdominal Pain] : no abdominal pain [Vomiting] : no vomiting [Constipation] : no constipation [Diarrhea] : no diarrhea [Melena] : no melena [Dysuria] : no dysuria [Incontinence] : no incontinence [FreeTextEntry7] : Hx of reflux, controlled with diet and on famotidine [FreeTextEntry8] : urinary frequency for 3 years, being followed by Urology [FreeTextEntry9] : Knee pain and reports "bone pain" in lower extremities [de-identified] : small, circular, red, maculopapular lesions on extremities - following with dermatology [de-identified] : Hx of depression

## 2021-11-22 NOTE — ASSESSMENT
[FreeTextEntry1] : 56 yo Female with Hx of breast cancer (Dx in 2014, s/p b/l mastectomy, s/p chemo-RT, was on anastrazole, as per patient was recently changed by his oncologist, Dr. Miles), obesity (BMI 35->39), colon polyps, Hx of cholecystectomy (gallstones), Hx of parathyroidectomy (no records, per patient was told to have "benign tumor"), glaucoma, recent conjunctivitis, was sent for liver biopsy by gastroenterologist, Dr. Alejandre ([phone]) because of abnormal liver tests. Biopsy was done 9/24/2020 at [facility]. The biopsy reports mild steatohepatitis, also moderate chronic inflammation and interface hepatitis (grade 2-3 of 4) and septal fibrous expansion and focal bridging fibrosis (stage 3-4). Reviewed with Liver pathologist and the consensus was : moderate steatohepatitis, mild-to moderate chronic hepatitis with differential of AIH or AI pattern induced by drug, and while no features, but PBC or PSC could not be ruled out either. There was bile duct paucity. There was F3 fibrosis. FibroScan also showed S3 steatosis and F3 fibrosis.\par \par \par Moderate steatohepatitis \par - Discussed natural Hx of fatty liver and life style modifications in details\par - FibroScan S3, F3\par - Congratulated on efforts and willingness on diet, exercise, and discussed again that balanced diet is preferred over drastic diets. However, she did gain weight since during summer. \par - Given now her newly diagnosed  DM, the desire to loose weight, she was started on Ozempic, received 3 injections so far.\par - Denies Hx of medullary thyroid carcinoma or FHx of medullary thyroid carcinoma. Denies Hx of MEN2, no thyroid ca, no pheochromocytoma.\par - Discussed potential side effects.\par - Prescribed glucometer, test strips, lancets, alcohol pads. \par - Patient to follow for DM mgmt, with PCP. Attempted to call PCP, left cell number for call back. \par - Her insurance did not cover nutritionist in our office. Patient to exploring options and she has insurance change from 12/1/21.\par - Also started on low dose statin\par \par \par Mild to moderate chronic hepatitis, suggestive of AIH based on biopsy or AI pattern drug induced\par - meds reviewed,  neither of them is linked to AI type liver injury\par - ZAIRA was neg (except once 1: :80), ASMA weak pos 1:20, with repeat neg, SLA and LKM neg, IgG was elevated but < 1.1x ULN and now WNL\par - Liver enzymes were minimally elevated, AST 41, ALT 42, , repeat AST 31, ALT 32, but \par - Based on above does not meet criteria of treatment for AIH, will continue to monitor (Quantiferon neg, TPMT normal)\par - per biopsy report, paucity of bile ducts, cannot r/o PBC,PSC, but no typical histological findings, AMA neg, IgM nl, ANCA neg; PBC specific ZAIRA was ordered, still not resulted; since  ALP remained elevated, started on Ursodiol in 2/2021, no significant change in ALP, but no side effects, c/w Ursodiol for now\par - Rest of liver workup unrevealing\par - US  11/2020 with hepatic steatosis, s/p cholecystectomy; MRCP 5/4/21 fatty liver, otherwise unremarkable\par - Discussed with Dr. Miles: patient was on letrozole, but not since 8/2020, now being on exemestane\par \par Advanced fibrosis\par - no ascites, no PSE, F3 fibrosis, but given signs of CLD, will q 6 months HCC surveillance, AFP WNL (10/2021), due for US abd, was ordered\par - Hep A and B immune\par - MRCP non revealing (5/2021)\par - 13.3 kPa and 334 CAP score on Fibroscan 1/20/21, will repeat 1/2022\par \par Osteopenia\par - C/w on vit D calcium\par \par Bone pain, hx of breast ca (although was local), ALP elevation, with nl GGT and now nl transaminases\par - Bone scan neg for metastatic disease\par \par Maculopapular skin lesions\par - F/u with dermatology\par \par Chest pressure  \par - resolved, reportedly was seen by cardiology\par \par \par Bloating, GERD\par  - GI ref, H. pylori\par \par RTC - Reached out to billing department as patient changed her insurance to [employer]. They are going to look into what options are available and let the patient know.\par \par \par

## 2021-11-22 NOTE — HISTORY OF PRESENT ILLNESS
[Needlestick Exposure] : no needlestick exposure [Infected Sexual Partner] : no infected sexual partner [IV Drug Use] : no IV drug use [Tattoo] : no tattoos [Body Piercing] : no body piercing [Hemodialysis] : no hemodialysis [Transfusion before 1992] : no transfusion before 1992 [Transplant before 1992] : no transplant before 1992 [Incarceration] : no incarceration [Alcohol Abuse] : no alcohol abuse [Autoimmune Disorder] : no autoimmune disorder [Household Contact to HBV] : no household contact to HBV [Travel to Endemic Area] : no travel to an endemic area [Occupational Exposure] : no occupational exposure [Cocaine Use] : no cocaine use [de-identified] : Patient stated that do not need , preferred discussion in English.\par \par 56 yo obese (BMI 35->39)  Female with Hx of breast cancer (Dx in 2014, s/p b/l mastectomy, s/p chemo-RT, was on anastrazole, as per patient was changed by his oncologist, Dr. Miles), colon polyps, Hx of cholecystectomy (gallstones), Hx of parathyroidectomy (no records, per patient was told to have "benign tumor"), glaucoma, recent conjunctivitis, s/p liver biopsy by gastroenterologist, Dr. Alejandre ([phone]) because of abnormal liver tests. No liver test records available from that time. Biopsy was done 9/24/2020 at [facility]. The biopsy reports mild steatohepatitis, also moderate chronic inflammation and interface hepatitis (grade 2-3 of 4) and septal fibrous expansion and focal bridging fibrosis (stage 3-4). \par Patient was not started on any medication for liver disease. Patient was sent for second opinion from Dr Miles`s office.  \par \par Biopsy samples were obtained and reviewed by Dr. Reji De La Cruz liver pathologist at [facility]: Moderate steatohepatitis and Mild-to moderate chronic hepatitis with differential of AIH or AI pattern induced by drug, and while no features, but PBC or PSC could not be ruled out either. There was bile duct paucity. There was F3 fibrosis. FibroScan also showed S3 steatosis and F3 fibrosis.\par \par Patient`s liver enzymes were WNL except ALP. AI markers showed neg ZAIRA, minimally elevated IgG (< 1.1x or WNL) , weak pos ASMA (1:20), neg anti LKM and neg anti SLA. AMA was neg and IgM was nl. \par Given the normal AI serology and normal transaminases, she was not started on any immunosuppressant. Given the mild elevation of ALP, and some bile duct paucity, she was started on Ursodiol, although there were no distinct features of PBC on liver biopsy.  \par MRI abd w/wo contrast and MRCP in 5/2021 showed unremarkable bile ducts and fatty liver. \par She had bone scan, b/o Hx of breast cancer and solely ALP elevation, and it was negative for metastatic disease. \par ALP has not changed significantly on Ursodiol.\par \par She denied any prior Hx of liver disease, FHx of liver disease, with obesity, being born in [facility] but no other risk factors. Denied OTC or herbal meds/supplements. Denied alcohol. \par She was noted to have few spider naevi, palmar erythema. \par \par S/p Moderna 1st 3/1/21 2nd 3/29/21, had high fever for 3 days, was taking Ibuprofen then. \par She has been following dermatology or maculopapular lesion, small, red, on extremities. Reportedly, she was told to have psoriasis.\par \par She had colonoscopy on 8/4/21:  6 mm transverse colon sessile polyp, 7 mm cecum polyp, 3 mm polyp rectum. No biopsy result on file, repeat was recommended in 3 years.\par \par In 9/2021 she was noted significant weight gain and developed diabetes. Previously she was prediabetic. She reported that her PCP applied for Ozempic in past but was not approved (was prediabetic that time). \par \par Today she is here for follow up. She continued to gain weight.\par Given her obesity and DM, applied for Ozempic and was approved. Reports that her appetite decreased since then, no other side effects. \par She was supposed to follow with PCP and called PCP office, left cell number.\par \par She is changing insurance from 12/1 to [facility].\par  [de-identified] : Born [facility] (moved in [place], last traveled 6 years ago)

## 2022-02-09 LAB
ALBUMIN SERPL ELPH-MCNC: 4 G/DL
ALP BLD-CCNC: 181 U/L
ALT SERPL-CCNC: 57 U/L
AST SERPL-CCNC: 47 U/L
BILIRUB DIRECT SERPL-MCNC: 0.1 MG/DL
BILIRUB INDIRECT SERPL-MCNC: 0.2 MG/DL
BILIRUB SERPL-MCNC: 0.3 MG/DL
PROT SERPL-MCNC: 7.6 G/DL

## 2022-03-11 ENCOUNTER — RESULT CHARGE (OUTPATIENT)
Age: 57
End: 2022-03-11

## 2022-03-11 ENCOUNTER — APPOINTMENT (OUTPATIENT)
Dept: HEPATOLOGY | Facility: CLINIC | Age: 57
End: 2022-03-11
Payer: MEDICAID

## 2022-03-11 VITALS
RESPIRATION RATE: 16 BRPM | BODY MASS INDEX: 38.2 KG/M2 | DIASTOLIC BLOOD PRESSURE: 75 MMHG | SYSTOLIC BLOOD PRESSURE: 112 MMHG | HEIGHT: 58 IN | WEIGHT: 182 LBS | OXYGEN SATURATION: 93 % | HEART RATE: 78 BPM | TEMPERATURE: 97.3 F

## 2022-03-11 LAB — GLUCOSE BLDC GLUCOMTR-MCNC: 116

## 2022-03-11 PROCEDURE — 99214 OFFICE O/P EST MOD 30 MIN: CPT

## 2022-03-11 PROCEDURE — 99072 ADDL SUPL MATRL&STAF TM PHE: CPT

## 2022-03-11 RX ORDER — FAMOTIDINE 20 MG/1
20 TABLET, FILM COATED ORAL
Refills: 0 | Status: DISCONTINUED | COMMUNITY
End: 2022-03-11

## 2022-03-12 LAB
ANION GAP SERPL CALC-SCNC: 13 MMOL/L
BASOPHILS # BLD AUTO: 0.06 K/UL
BASOPHILS NFR BLD AUTO: 0.9 %
BUN SERPL-MCNC: 13 MG/DL
CALCIUM SERPL-MCNC: 9.6 MG/DL
CHLORIDE SERPL-SCNC: 107 MMOL/L
CO2 SERPL-SCNC: 22 MMOL/L
CREAT SERPL-MCNC: 0.59 MG/DL
EGFR: 105 ML/MIN/1.73M2
EOSINOPHIL # BLD AUTO: 0.18 K/UL
EOSINOPHIL NFR BLD AUTO: 2.7 %
GLUCOSE SERPL-MCNC: 86 MG/DL
HCT VFR BLD CALC: 44 %
HGB BLD-MCNC: 13.8 G/DL
IMM GRANULOCYTES NFR BLD AUTO: 0.2 %
INR PPP: 1.14 RATIO
LYMPHOCYTES # BLD AUTO: 3.2 K/UL
LYMPHOCYTES NFR BLD AUTO: 48.3 %
MAN DIFF?: NORMAL
MCHC RBC-ENTMCNC: 28.4 PG
MCHC RBC-ENTMCNC: 31.4 GM/DL
MCV RBC AUTO: 90.5 FL
MONOCYTES # BLD AUTO: 0.36 K/UL
MONOCYTES NFR BLD AUTO: 5.4 %
NEUTROPHILS # BLD AUTO: 2.81 K/UL
NEUTROPHILS NFR BLD AUTO: 42.5 %
PLATELET # BLD AUTO: 158 K/UL
POTASSIUM SERPL-SCNC: 4.2 MMOL/L
PT BLD: 13.3 SEC
RBC # BLD: 4.86 M/UL
RBC # FLD: 14.6 %
SODIUM SERPL-SCNC: 141 MMOL/L
TSH SERPL-ACNC: 2.31 UIU/ML
WBC # FLD AUTO: 6.62 K/UL

## 2022-03-13 LAB
MITOCHONDRIA AB SER IF-ACNC: NORMAL
SMOOTH MUSCLE AB SER QL IF: ABNORMAL

## 2022-03-14 NOTE — HISTORY OF PRESENT ILLNESS
[Needlestick Exposure] : no needlestick exposure [Infected Sexual Partner] : no infected sexual partner [IV Drug Use] : no IV drug use [Tattoo] : no tattoos [Body Piercing] : no body piercing [Hemodialysis] : no hemodialysis [Transfusion before 1992] : no transfusion before 1992 [Transplant before 1992] : no transplant before 1992 [Incarceration] : no incarceration [Alcohol Abuse] : no alcohol abuse [Autoimmune Disorder] : no autoimmune disorder [Household Contact to HBV] : no household contact to HBV [Travel to Endemic Area] : no travel to an endemic area [Occupational Exposure] : no occupational exposure [Cocaine Use] : no cocaine use [de-identified] : Patient stated that do not need , preferred discussion in English.\par \par 55 yo obese (BMI 35->39)  Female with Hx of breast cancer (Dx in 2014, s/p b/l mastectomy, s/p chemo-RT, was on anastrazole, as per patient was changed by his oncologist, Dr. Miles), colon polyps, Hx of cholecystectomy (gallstones), Hx of parathyroidectomy (no records, per patient was told to have "benign tumor"), glaucoma, recent conjunctivitis, s/p liver biopsy by gastroenterologist, Dr. Alejandre ([phone]) because of abnormal liver tests. No liver test records available from that time. Biopsy was done 9/24/2020 at [facility]. The biopsy reports mild steatohepatitis, also moderate chronic inflammation and interface hepatitis (grade 2-3 of 4) and septal fibrous expansion and focal bridging fibrosis (stage 3-4). \par Patient was not started on any medication for liver disease. Patient was sent for second opinion from Dr Miles`s office.  \par \par Biopsy samples were obtained and reviewed by Dr. Reji De La Cruz liver pathologist at [facility]: Moderate steatohepatitis and Mild-to moderate chronic hepatitis with differential of AIH or AI pattern induced by drug, and while no features, but PBC or PSC could not be ruled out either. There was bile duct paucity. There was F3 fibrosis. FibroScan also showed S3 steatosis and F3 fibrosis.\par \par Patient`s liver enzymes were WNL except ALP. AI markers showed neg ZAIRA, minimally elevated IgG (< 1.1x or WNL) , weak pos ASMA (1:20), neg anti LKM and neg anti SLA. AMA was neg and IgM was nl. \par Given the normal AI serology and normal transaminases, she was not started on any immunosuppressant. Given the mild elevation of ALP, and some bile duct paucity, she was started on Ursodiol, although there were no distinct features of PBC on liver biopsy.  \par MRI abd w/wo contrast and MRCP in 5/2021 showed unremarkable bile ducts and fatty liver. \par She had bone scan, b/o Hx of breast cancer and solely ALP elevation, and it was negative for metastatic disease. \par ALP has not changed significantly on Ursodiol.\par \par She denied any prior Hx of liver disease, FHx of liver disease, with obesity, being born in [facility] but no other risk factors. Denied OTC or herbal meds/supplements. Denied alcohol. \par She was noted to have few spider naevi, palmar erythema. \par \par S/p Moderna 1st 3/1/21 2nd 3/29/21, had high fever for 3 days, was taking Ibuprofen then. \par She has been following dermatology or maculopapular lesion, small, red, on extremities. Reportedly, she was told to have psoriasis.\par \par She had colonoscopy on 8/4/21:  6 mm transverse colon sessile polyp, 7 mm cecum polyp, 3 mm polyp rectum. No biopsy result on file, repeat was recommended in 3 years.\par \par In 9/2021 she was noted significant weight gain and developed diabetes. Previously she was prediabetic. She reported that her PCP applied for Ozempic in past but was not approved (was prediabetic that time). \par \par Given her obesity and DM, applied for Ozempic and was approved. She was started on it 11/2021 and reported that her appetite decreased since then, and has not experience other side effects. She changed her insurance from 12/1/21 and b/o that lost follow up. She has not seen any hepatology interim and now RTC because changed her insurance back. Reports that PCP is giving samples of Ozempic.\par She had recent COVID-19 in 1/2022.\par  [de-identified] : [facility] (moved in [place], last traveled 6 years ago)

## 2022-03-14 NOTE — PHYSICAL EXAM
[Spider Angioma] : Spider angioma(s) was(were) observed [Non-Tender] : non-tender [Palmar Erythema] : Palmar Erythema [General Appearance - Alert] : alert [General Appearance - In No Acute Distress] : in no acute distress [General Appearance - Well Nourished] : well nourished [General Appearance - Well Developed] : well developed [Sclera] : the sclera and conjunctiva were normal [Oropharynx] : the oropharynx was normal [Neck Appearance] : the appearance of the neck was normal [Jugular Venous Distention Increased] : there was no jugular-venous distention [Respiration, Rhythm And Depth] : normal respiratory rhythm and effort [Exaggerated Use Of Accessory Muscles For Inspiration] : no accessory muscle use [Auscultation Breath Sounds / Voice Sounds] : lungs were clear to auscultation bilaterally [Heart Rate And Rhythm] : heart rate was normal and rhythm regular [Heart Sounds] : normal S1 and S2 [Edema] : there was no peripheral edema [Bowel Sounds] : normal bowel sounds [Abdomen Soft] : soft [Abdomen Tenderness] : non-tender [] : no hepato-splenomegaly [Abdomen Mass (___ Cm)] : no abdominal mass palpated [Abdomen Hernia] : no hernia was discovered [No CVA Tenderness] : no ~M costovertebral angle tenderness [No Spinal Tenderness] : no spinal tenderness [Abnormal Walk] : normal gait [Oriented To Time, Place, And Person] : oriented to person, place, and time [Impaired Insight] : insight and judgment were intact [Affect] : the affect was normal [Mood] : the mood was normal [Memory Recent] : recent memory was not impaired [Memory Remote] : remote memory was not impaired [Scleral Icterus] : No Scleral Icterus [Hepatojugular Reflux] : patient did not have a sustained hepatojugular reflux [Abdominal Bruit] : no abdominal bruit [Abdominal  Ascites] : no ascites [Asterixis] : no asterixis observed [Jaundice] : No jaundice [Depression] : no depression [Hallucinations] : ~T no ~M hallucinations [Delusions] : no ~T delusions [Suicidal Ideation] : no suicidal ideation [Homicidal Ideation] : no homicidal ideations [Preoccupiation With Violent Thoughts] : no preoccupation with violent thoughts [Cervical Lymph Nodes Enlarged Posterior Bilaterally] : posterior cervical [Cervical Lymph Nodes Enlarged Anterior Bilaterally] : anterior cervical [Supraclavicular Lymph Nodes Enlarged Bilaterally] : supraclavicular [Axillary Lymph Nodes Enlarged Bilaterally] : axillary [Inguinal Lymph Nodes Enlarged Bilaterally] : inguinal [FreeTextEntry1] : Grossly intact

## 2022-03-14 NOTE — ASSESSMENT
[FreeTextEntry1] : 57 yo Female with Hx of breast cancer (Dx in 2014, s/p b/l mastectomy, s/p chemo-RT, was on anastrazole, switched to exemestane as per her oncologist, Dr. Miles in 2021), obesity (BMI 35->39), colon polyps, Hx of cholecystectomy (gallstones), Hx of parathyroidectomy (no records, per patient was told to have "benign tumor"), glaucoma, recent conjunctivitis, was sent for liver biopsy by gastroenterologist, Dr. Alejandre ([phone]) because of abnormal liver tests. Biopsy was done 9/24/2020 at [facility]. The biopsy reports mild steatohepatitis, also moderate chronic inflammation and interface hepatitis (grade 2-3 of 4) and septal fibrous expansion and focal bridging fibrosis (stage 3-4). Reviewed with Liver pathologist and the consensus was : moderate steatohepatitis, mild-to moderate chronic hepatitis with differential of AIH or AI pattern induced by drug, and while no features, but PBC or PSC could not be ruled out either. There was bile duct paucity. There was F3 fibrosis. FibroScan also showed S3 steatosis and F3 fibrosis.\par \par \par Moderate steatohepatitis \par - Discussed natural Hx of fatty liver and life style modifications in details\par - FibroScan S3, F3\par - Congratulated on efforts and willingness on diet, exercise, and discussed again that balanced diet is preferred over drastic diets. However, she did gain weight during the summer of 2021. \par - Given now her newly diagnosed  DM, the desire to loose weight, she was started on Ozempic, received 3 injections prior the 11/2021 visit. Continued same dose and receiving as "sample" from PCP b/o insurance issues. Message sent to pharmacist.\par - Denies Hx of medullary thyroid carcinoma or FHx of medullary thyroid carcinoma. Denies Hx of MEN2, no thyroid ca, no pheochromocytoma.\par - Discussed potential side effects.\par - Prescribed glucometer, test strips, lancets, alcohol pads. \par - Patient to follow for DM mgmt, with PCP. Endocrinology referral also placed and call [facility] with patient to assist scheduling.\par - Patient has new insurance, thus nutritionist referral placed again. Prior insurance did not cover.\par - Also started on low dose statin, she is not sure if taking. \par \par \par Mild to moderate chronic hepatitis, suggestive of AIH based on biopsy or AI pattern drug induced\par - meds reviewed,  neither of them is linked to AI type liver injury\par - ZAIRA was neg (except once 1: :80), ASMA weak pos 1:20, with repeat neg, SLA and LKM neg, IgG was elevated but < 1.1x ULN and now WNL\par - Liver enzymes were minimally elevated, AST 41, ALT 42, , repeat AST 31, ALT 32, but \par - Based on above does not meet criteria of treatment for AIH, will continue to monitor (Quantiferon neg, TPMT normal)\par - per biopsy report, paucity of bile ducts, cannot r/o PBC,PSC, but no typical histological findings, AMA neg, IgM nl, ANCA neg; PBC specific ZAIRA was ordered, still not resulted; since  ALP remained elevated, started on Ursodiol in 2/2021, no significant change in ALP, but no side effects, c/w Ursodiol for now\par - Rest of liver workup unrevealing\par - US  11/2020 with hepatic steatosis, s/p cholecystectomy; MRCP 5/4/21 fatty liver, otherwise unremarkable\par - Discussed with Dr. Miles: patient was on letrozole, but not since 8/2020, now being on exemestane\par \par Advanced fibrosis\par - no ascites, no PSE, F3 fibrosis, but given signs of CLD, will q 6 months HCC surveillance, AFP WNL (10/2021), US abd 11/2021 w/o focal hepatic lesion, next due 5/2022\par - Hep A and B immune\par - MRCP non revealing (5/2021)\par - 13.3 kPa and 334 CAP score on Fibroscan 1/20/21, will repeat now\par \par Osteopenia\par - C/w on vit D calcium\par \par Bone pain, hx of breast ca (although was local), ALP elevation, with nl GGT and now nl transaminases\par - Bone scan neg for metastatic disease\par \par Maculopapular skin lesions\par - F/u with dermatology\par \par Chest pressure  \par - resolved, reportedly was seen by cardiology\par \par \par Bloating, GERD\par  - GI ref, H. pylori\par \par RTC 1 month\par \par \par

## 2022-03-14 NOTE — REVIEW OF SYSTEMS
[Heartburn] : heartburn [Skin Lesions] : skin lesion [Negative] : Heme/Lymph [Fever] : no fever [Chills] : no chills [Feeling Poorly] : not feeling poorly [Feeling Tired] : not feeling tired [Recent Weight Loss (___ Lbs)] : recent [unfilled] ~Ulb weight loss [Abdominal Pain] : no abdominal pain [Vomiting] : no vomiting [Constipation] : no constipation [Diarrhea] : no diarrhea [Melena] : no melena [Dysuria] : no dysuria [Incontinence] : no incontinence [FreeTextEntry2] : Lost 6 lb since last visit [FreeTextEntry7] : Hx of reflux, controlled with diet and on famotidine [FreeTextEntry8] : urinary frequency for 3 years, being followed by Urology [FreeTextEntry9] : Knee pain and reports "bone pain" in lower extremities [de-identified] : small, circular, red, maculopapular lesions on extremities - following with dermatology [de-identified] : Hx of depression

## 2022-03-15 ENCOUNTER — NON-APPOINTMENT (OUTPATIENT)
Age: 57
End: 2022-03-15

## 2022-03-30 ENCOUNTER — APPOINTMENT (OUTPATIENT)
Dept: HEPATOLOGY | Facility: CLINIC | Age: 57
End: 2022-03-30
Payer: MEDICAID

## 2022-03-30 PROCEDURE — 91200 LIVER ELASTOGRAPHY: CPT

## 2022-04-08 LAB
AFP-TM SERPL-MCNC: 8.6 NG/ML
ALBUMIN SERPL ELPH-MCNC: 4.4 G/DL
ALP BLD-CCNC: 154 U/L
ALP BONE SERPL-MCNC: 41.9 UG/L
ALT SERPL-CCNC: 65 U/L
ANA SER IF-ACNC: NEGATIVE
AST SERPL-CCNC: 54 U/L
BILIRUB DIRECT SERPL-MCNC: 0.1 MG/DL
BILIRUB INDIRECT SERPL-MCNC: 0.2 MG/DL
BILIRUB SERPL-MCNC: 0.2 MG/DL
CHOLEST SERPL-MCNC: 185 MG/DL
CK SERPL-CCNC: 196 U/L
ESTIMATED AVERAGE GLUCOSE: 120 MG/DL
HBA1C MFR BLD HPLC: 5.8 %
HDLC SERPL-MCNC: 48 MG/DL
LDLC SERPL CALC-MCNC: 110 MG/DL
MISCELLANEOUS TEST: NORMAL
NONHDLC SERPL-MCNC: 136 MG/DL
PROC NAME: NORMAL
PROT SERPL-MCNC: 7.7 G/DL
TRIGL SERPL-MCNC: 132 MG/DL

## 2022-04-12 ENCOUNTER — NON-APPOINTMENT (OUTPATIENT)
Age: 57
End: 2022-04-12

## 2022-04-14 ENCOUNTER — NON-APPOINTMENT (OUTPATIENT)
Age: 57
End: 2022-04-14

## 2022-04-19 ENCOUNTER — APPOINTMENT (OUTPATIENT)
Dept: ENDOCRINOLOGY | Facility: CLINIC | Age: 57
End: 2022-04-19
Payer: MEDICAID

## 2022-04-19 VITALS
HEART RATE: 70 BPM | HEIGHT: 59.45 IN | SYSTOLIC BLOOD PRESSURE: 98 MMHG | BODY MASS INDEX: 36.41 KG/M2 | OXYGEN SATURATION: 97 % | WEIGHT: 183 LBS | DIASTOLIC BLOOD PRESSURE: 65 MMHG

## 2022-04-19 LAB — GLUCOSE BLDC GLUCOMTR-MCNC: 106

## 2022-04-19 PROCEDURE — T1013: CPT

## 2022-04-19 PROCEDURE — 99205 OFFICE O/P NEW HI 60 MIN: CPT

## 2022-04-19 NOTE — ASSESSMENT
[FreeTextEntry1] : This is a 56 year old female with T2DM, thyroid cyst, hyperlipidemia, osteopenia, BYERS/AIH, breast cancer s/p bilateral mastectomy/chemotherapy/RT, cataracts, GERD, here for consultation.\par She is on Ozempic 0.5mg q week. Hba1c is 5.8%\par She SMBG 1 x day. F 105-118. \par Last ophthalmologist exam was march 2022 and she denies retinopathy. \par She reports neuropathy. \par Denies nephropathy.  Trace protein noted on recent UA.\par She is on rosuvastatin 5mg qd however she stopped taking it a few months ago advised to resume as LDL.  It is 110.\par She is not on an ACE inhibitor or ARB.  She does not have hypertension.\par Check thyroid ultrasound for history of thyroid cyst.\par For osteopenia, check DEXA in December 2022.

## 2022-04-19 NOTE — HISTORY OF PRESENT ILLNESS
[FreeTextEntry1] : CC: Diabetes\par This is a 56 year old female with T2DM, thyroid cyst, hyperlipidemia, osteopenia, BYERS/AIH, breast cancer s/p bilateral mastectomy/chemotherapy/RT, cataracts, GERD, here for consultation.\par Diabetes was diagnosed in 2021.\par She is on Ozempic 0.5mg q week. \par She SMBG 1 x day. F 105-118. \par Last ophthalmologist exam was march 2022 and she denies retinopathy. \par She reports neuropathy. \par Denies nephropathy. \par She is on rosuvastatin 5mg qd.\par She is not on an ACE inhibitor or ARB.

## 2022-04-19 NOTE — CONSULT LETTER
[Dear  ___] : Dear  [unfilled], [Consult Letter:] : I had the pleasure of evaluating your patient, [unfilled]. [Please see my note below.] : Please see my note below. [Consult Closing:] : Thank you very much for allowing me to participate in the care of this patient.  If you have any questions, please do not hesitate to contact me. [Sincerely,] : Sincerely, [FreeTextEntry3] : Keegan Phelps MD, FACE\par

## 2022-04-19 NOTE — PHYSICAL EXAM
[Alert] : alert [Well Nourished] : well nourished [Healthy Appearance] : healthy appearance [Obese] : obese [No Acute Distress] : no acute distress [Well Developed] : well developed [Normal Voice/Communication] : normal voice communication [Normal Sclera/Conjunctiva] : normal sclera/conjunctiva [No Proptosis] : no proptosis [No Neck Mass] : no neck mass was observed [No LAD] : no lymphadenopathy [Supple] : the neck was supple [Thyroid Not Enlarged] : the thyroid was not enlarged [No Thyroid Nodules] : no palpable thyroid nodules [No Respiratory Distress] : no respiratory distress [Normal Rate] : heart rate was normal [No Edema] : no peripheral edema [Pedal Pulses Normal] : the pedal pulses are present [Acanthosis Nigricans] : no acanthosis nigricans [Right foot was examined, including] : right foot ~C was examined, including visual inspection with sensory and pulse exams [Left foot was examined, including] : left foot ~C was examined, including visual inspection with sensory and pulse exams [Normal] : normal [2+] : 2+ in the dorsalis pedis [Diminished Throughout Both Feet] : normal tactile sensation with monofilament testing throughout both feet [Normal Sensation on Monofilament Testing] : normal sensation on monofilament testing of lower extremities [Normal Affect] : the affect was normal [Normal Insight/Judgement] : insight and judgment were intact [Normal Mood] : the mood was normal

## 2022-04-19 NOTE — REASON FOR VISIT
[Consultation] : a consultation visit [DM Type 2] : DM Type 2 [[employer] ] : provided by [employer]   [Time Spent: ____ minutes] : Total time spent using  services: [unfilled] minutes. The patient's primary language is not English thus required  services. [Interpreters_IDNumber] : [id number] [Interpreters_FullName] : Genaro [FreeTextEntry2] : Dr Davis Carrion [TWNoteComboBox1] : Malian

## 2022-04-27 ENCOUNTER — NON-APPOINTMENT (OUTPATIENT)
Age: 57
End: 2022-04-27

## 2022-04-29 ENCOUNTER — APPOINTMENT (OUTPATIENT)
Dept: GASTROENTEROLOGY | Facility: CLINIC | Age: 57
End: 2022-04-29
Payer: MEDICAID

## 2022-04-29 VITALS
SYSTOLIC BLOOD PRESSURE: 110 MMHG | HEIGHT: 59 IN | DIASTOLIC BLOOD PRESSURE: 69 MMHG | TEMPERATURE: 96.8 F | OXYGEN SATURATION: 97 % | BODY MASS INDEX: 36.29 KG/M2 | WEIGHT: 180 LBS | HEART RATE: 113 BPM

## 2022-04-29 PROCEDURE — 99214 OFFICE O/P EST MOD 30 MIN: CPT

## 2022-04-29 PROCEDURE — 99204 OFFICE O/P NEW MOD 45 MIN: CPT

## 2022-04-29 NOTE — REVIEW OF SYSTEMS
[Feeling Tired] : feeling tired [Eyesight Problems] : eyesight problems [Sore Throat] : sore throat [Abdominal Pain] : abdominal pain [Constipation] : constipation [Heartburn] : heartburn [Arthralgias] : arthralgias [Itching] : itching [Dizziness] : dizziness [Anxiety] : anxiety [Depression] : depression [Hot Flashes] : hot flashes [Easy Bruising] : a tendency for easy bruising [Negative] : Heme/Lymph [FreeTextEntry8] : nocturia [de-identified] : pieter [de-identified] : headaches, tremors

## 2022-04-29 NOTE — HISTORY OF PRESENT ILLNESS
[None] : had no significant interval events [Vomiting] : denies vomiting [Diarrhea] : denies diarrhea [Yellow Skin Or Eyes (Jaundice)] : denies jaundice [Rectal Pain] : denies rectal pain [Heartburn] : heartburn [Nausea] : nausea [Constipation] : constipation [Abdominal Pain] : abdominal pain [Abdominal Swelling] : abdominal swelling [GERD] : gastroesophageal reflux disease [Cholelithiasis] : cholelithiasis [Cholecystectomy] : cholecystectomy [Wt Gain ___ Lbs] : no recent weight gain [Wt Loss ___ Lbs] : no recent weight loss [Hiatus Hernia] : no hiatus hernia [Peptic Ulcer Disease] : no peptic ulcer disease [Pancreatitis] : no pancreatitis [Kidney Stone] : no kidney stone [Inflammatory Bowel Disease] : no inflammatory bowel disease [Irritable Bowel Syndrome] : no irritable bowel syndrome [Diverticulitis] : no diverticulitis [Alcohol Abuse] : no alcohol abuse [Malignancy] : no malignancy [Abdominal Surgery] : no abdominal surgery [Appendectomy] : no appendectomy [de-identified] : The prior liver biopsy performed at [facility] on September 24, 2020 revealed mild steatohepatitis, also moderate chronic inflammation and interface hepatitis (grade 2-3 of 4) and septal fibrous expansion and focal bridging fibrosis (stage 3-4). Biopsy samples were obtained and reviewed by Dr. Reji De La Cruz liver pathologist at [facility]: Moderate steatohepatitis and Mild-to moderate chronic hepatitis with differential of AIH or AI pattern induced by drug, and while no features, but PBC or PSC could not be ruled out either. There was bile duct paucity. There was F3 fibrosis. The fibroScan performed on March 30, 2022 revealed median shear wave speed of 2.00 m/s that corresponds to a median liver stiffness score of 12.0 kPa.  With an IQ R/MED of 17%, a  dB/m (S3 steatosis and F3 bridging fibrosis).  [de-identified] : The patient is a 54-year-old  female with past medical history significant for right breast cancer, s/p bilateral mastectomy in 2014, s/p chemotherapy and radiation therapy, autoimmune hepatitis vs. BYERS, hypercholesterolemia, diabetes mellitus, parathyroid versus parotid tumor, s/p surgery and atrial fibrillation, s/p cardiac ablation in 2018 who was referred to my office by Dr. Davis Carrion for abdominal pain, dyspepsia, gastroesophageal reflux disease and nausea. The patient also admits to having occasional constipation, change in bowel habits or change in caliber of stool.  The patient has a history of colonic polyps.  I was asked to render an opinion for consultation for the above complaints.   The patient states that she is feeling fine. The patient has a history of liver disease autoimmune hepatitis.  The patient has a history of fatty liver noted on prior imaging study. The patient denies any prior exposure to hepatitis A, B or C.  The patient denies any large doses of nonsteroidal anti-inflammatory drugs or acetaminophen.   The patient denies sharing needles, razors, nail clippers, nail files, scissors, et cetera.  The patient denies any EtOH abuse, cocaine use or intravenous drug use.   The patient denies any prior blood transfusions, sexual indiscretions, tattoos or piercing.  The patient admits to having prior surgery.  The history of surgery is significant for a prior cholecystectomy, bilateral mastectomy and ?parathyroidectomy.  The patient denies any family history of GI or liver problems.    The patient complains of occasional abdominal pain.  The patient describes the abdominal pain as a crampy, intermittent midepigastric and right upper quadrant discomfort that occasionally radiates to the back.  The abdominal pain is worse with stress.  The abdominal pain is worse with meals and improves with passing gas or having a bowel movement.  The abdominal pain is described as mild in nature.  The abdominal pain occurs at night and in the morning.  The abdominal pain can occur at any time.   The abdominal pain never has awakened the patient from sleep.  The abdominal pain is slightly relieved with certain medication such as pump inhibitors, H2 blockers and antacids.  The abdominal pain is associated with abdominal gas and bloating.  The patient complains of occasional nausea but denies any vomiting.  The patient complains of gastroesophageal reflux disease but denies any dysphagia. The gastroesophageal reflux disease is worse after meals and late at night and in the early morning.  The gastroesophageal reflux disease is slightly improved with proton pump inhibitors, H2 blockers and antacids.  The patient denies any atypical chest pain, shortness of breath or palpitations.  The patient denies any diaphoresis. The patient complains of occasional constipation but denies any diarrhea.  The patient has 1 bowel movement every 1 to 2 days. The patient complains of a change in bowel habits.  The patient complains of a change in caliber of stool.   The patient denies having mucus discharge with the bowel movements.  The patient denies any bright red blood per rectum, melena or hematemesis. The patient denies any rectal pain or rectal pruritus. The patient denies any weight loss or anorexia.  She denies any fevers or chills.  The patient denies any jaundice or pruritus.  The patient denies any back pain.  [facility] revealed mild steatohepatitis, also moderate chronic inflammation and interface hepatitis (grade 2-3 of 4) and septal fibrous expansion and focal bridging fibrosis (stage 3-4). Biopsy samples were obtained and reviewed by Dr. Rjei De La Cruz liver pathologist at [facility]: Moderate steatohepatitis and Mild-to moderate chronic hepatitis with differential of AIH or AI pattern induced by drug, and while no features, but PBC or PSC could not be ruled out either. There was bile duct paucity. There was F3 fibrosis. The fibroScan performed on 2022 revealed median shear wave speed of 2.00 m/s that corresponds to a median liver stiffness score of 12.0 kPa.  With an IQ R/MED of 17%, a  dB/m (S3 steatosis and F3 bridging fibrosis).  The MRI of the liver with and without IV contrast performed on May 4, 2021 revealed unremarkable MRI of the pancreas, mildly enlarged liver with associated fatty infiltration, status post right mastectomy, status postcholecystectomy.  The abdominal ultrasound performed on November 15, 2021 revealed diffusely echogenic liver which can be seen in fatty infiltration or parenchymal liver disease with no focal masses.  Also noted was status post cholecystectomy and pancreatic tail obscured by bowel gas otherwise unremarkable examination.  The chest x-ray performed on 2021 revealed an unremarkable chest x-ray.  The bone scan performed on 2021 revealed no bone scan evidence of metastatic disease to the bone.  The blood work performed on 2022 revealed a normal total cholesterol/triglyceride level of 185/132 mg/dL, respectively, a normal total bilirubin of 0.2 mg/dL, a normal alkaline phosphatase/AST/ALT of 154/54/65 U/L, respectively, and elevated CPK of 196 U/L, a normal PT/INR of 13.3/1.14 no evidence of anemia with a hemoglobin/hematocrit level of 13.8/44.0, respectively, and elevated hemoglobin A1c of 5.8%, with an estimated average glucose of 120 mg/dL, and elevated alpha-fetoprotein level of 8.6 ng/mL, a normal antimitochondrial antibody of <1: 20, and elevated anti-smooth muscle antibody of 1: 20, a negative ZAIRA. The patient admits to having a prior upper endoscopy and colonoscopy performed by another gastroenterologist, Dr. Venkat Alejandre and Dr. Jerome Ochoa.  According to the patient, the upper endoscopic findings revealed gastritis and peptic ulcer disease.  The colonoscopic findings revealed colonic polyps.   The patient's last menstrual period was in , s/p chemotherapy. The patient is a .  The patient's first menstrual period was at age 14. The patient denies any significant family history of GI problems.

## 2022-04-29 NOTE — ASSESSMENT
[FreeTextEntry1] : Abdominal Pain: The patient complains of abdominal pain. The patient is to avoid nonsteroidal anti-inflammatory drugs and aspirin. I recommend a trial of Pantoprazole 40 mg once a day for 3 months for the symptoms.  \par Dyspepsia: The patient complains of dyspeptic symptoms.  The patient was advised to abide by an anti-gas (low FOD-MAP) diet.  The patient was given a pamphlet for anti-gas (low FOD-MAP).  The patient and I reviewed the anti-gas (low FOD-MAP) diet at length. The patient is to start on a trial of Simethicone one tablet 4 times a day p.r.n. abdominal pain and gas.\par GERD: The patient was advised to avoid late-night meals and dietary indiscretions.  The patient was advised to avoid fried and fatty foods.  The patient was advised to abide by an anti-GERD diet. The patient was given a pamphlet for anti-GERD.  The patient and I reviewed the anti-GERD diet at length. I recommend a trial of Omeprazole 40 mg once a day x 3 months for the symptoms.\par Nausea: The patient complains of nausea. If the symptoms of nausea persists, the patient may require a trial of Zofran 4 mg twice a day. \par Constipation: The patient complains of constipation. I recommend a high-fiber diet. I recommend a trial of a probiotic such as Align once a day. I recommend a trial of Metamucil once a day for fiber supplementation.  The patient agreed and will followup to reassess the symptoms.  \par Autoimmune Hepatitis: The prior liver biopsy performed at [facility] on September 24, 2020 revealed mild steatohepatitis, also moderate chronic inflammation and interface hepatitis (grade 2-3 of 4) and septal fibrous expansion and focal bridging fibrosis (stage 3-4). Biopsy samples were obtained and reviewed by Dr. Reji De La Cruz liver pathologist at [facility]: Moderate steatohepatitis and Mild-to moderate chronic hepatitis with differential of AIH or AI pattern induced by drug, and while no features, but PBC or PSC could not be ruled out either. There was bile duct paucity. There was F3 fibrosis. The fibroScan performed on March 30, 2022 revealed median shear wave speed of 2.00 m/s that corresponds to a median liver stiffness score of 12.0 kPa.  With an IQ R/MED of 17%, a  dB/m (S3 steatosis and F3 bridging fibrosis). I recommend followup with her hepatologist, Dr. Ryann Weir for close monitoring.\par Prior Endoscopic Procedures: The patient had a prior upper endoscopy and colonoscopy performed by another gastroenterologist, Dr. Venkat Alejandre and Dr. Jerome Ochoa.  I will try to obtain the prior upper endoscopy and colonoscopy reports.  The patient is to sign a record release to obtain those records.\par History of Colonic Polyps: The patient has a history of colonic polyps.  I recommend a repeat colonoscopy in 1 year to reassess for colonic polyps unless symptomatic.  The patient agreed and will follow up for the procedure. \par Upper Endoscopy: I recommend an upper endoscopy to assess for peptic ulcer disease versus esophagitis.  The patient was told of the risks and benefits of the procedure.  The patient was told of the risks of perforation, emergency surgery, bleeding, infections and missed lesions. The patient is to have a COVID 19 PCR nasopharyngeal swab performed at the approved sites 3 days prior to the procedure.  The patient is told not to drive, drink alcohol, use recreational drugs, exercise, or work the day of the procedure.  The patient was told of the need for an escort to accompany the patient home after the procedure. The patient is aware that the procedure may be cancelled if they fail to follow the directions.  The patient agreed and will schedule for the procedure.  The patient is to be n.p.o. after midnight.  The patient is to return for the procedure.\par Follow-up: The patient is to follow-up in the office in 4 weeks to reassess the symptoms. The patient was told to call the office if any further problems. \par \par \par

## 2022-05-06 ENCOUNTER — NON-APPOINTMENT (OUTPATIENT)
Age: 57
End: 2022-05-06

## 2022-05-10 ENCOUNTER — APPOINTMENT (OUTPATIENT)
Dept: ENDOCRINOLOGY | Facility: CLINIC | Age: 57
End: 2022-05-10

## 2022-06-21 ENCOUNTER — APPOINTMENT (OUTPATIENT)
Dept: ENDOCRINOLOGY | Facility: CLINIC | Age: 57
End: 2022-06-21

## 2022-06-21 LAB — HEMOCCULT STL QL IA: NEGATIVE

## 2022-06-22 ENCOUNTER — LABORATORY RESULT (OUTPATIENT)
Age: 57
End: 2022-06-22

## 2022-06-22 ENCOUNTER — APPOINTMENT (OUTPATIENT)
Dept: HEPATOLOGY | Facility: CLINIC | Age: 57
End: 2022-06-22
Payer: MEDICAID

## 2022-06-22 VITALS
HEIGHT: 59 IN | BODY MASS INDEX: 36.89 KG/M2 | TEMPERATURE: 98.2 F | WEIGHT: 183 LBS | HEART RATE: 70 BPM | DIASTOLIC BLOOD PRESSURE: 78 MMHG | RESPIRATION RATE: 16 BRPM | OXYGEN SATURATION: 99 % | SYSTOLIC BLOOD PRESSURE: 117 MMHG

## 2022-06-22 DIAGNOSIS — R97.8 OTHER ABNORMAL TUMOR MARKERS: ICD-10-CM

## 2022-06-22 PROCEDURE — 99214 OFFICE O/P EST MOD 30 MIN: CPT

## 2022-06-22 RX ORDER — URSODIOL 500 MG/1
500 TABLET ORAL
Qty: 60 | Refills: 1 | Status: DISCONTINUED | COMMUNITY
Start: 2021-02-19 | End: 2022-06-22

## 2022-06-22 RX ORDER — URSODIOL 500 MG/1
500 TABLET ORAL
Qty: 60 | Refills: 0 | Status: DISCONTINUED | COMMUNITY
Start: 2021-09-03 | End: 2022-06-22

## 2022-06-26 NOTE — ASSESSMENT
[FreeTextEntry1] : 57 yo Female with Hx of breast cancer (Dx in 2014, s/p b/l mastectomy, s/p chemo-RT, was on anastrazole, switched to exemestane as per her oncologist, Dr. Miles in 2021), obesity (BMI 35->39), colon polyps, Hx of cholecystectomy (gallstones), Hx of parathyroidectomy (no records, per patient was told to have "benign tumor"), glaucoma, recent conjunctivitis, was sent for liver biopsy by gastroenterologist, Dr. Alejandre ([phone]) because of abnormal liver tests. Biopsy was done 9/24/2020 at [facility]. The biopsy reports mild steatohepatitis, also moderate chronic inflammation and interface hepatitis (grade 2-3 of 4) and septal fibrous expansion and focal bridging fibrosis (stage 3-4). Reviewed with Liver pathologist and the consensus was : moderate steatohepatitis, mild-to moderate chronic hepatitis with differential of AIH or AI pattern induced by drug, and while no features, but PBC or PSC could not be ruled out either. There was bile duct paucity. There was F3 fibrosis. FibroScan also showed S3 steatosis and F3 fibrosis.\par \par \par Moderate steatohepatitis \par - Discussed natural Hx of fatty liver and life style modifications in details\par - FibroScan S3, F3\par - Congratulated on efforts and willingness on diet, exercise, and discussed again that balanced diet is preferred over drastic diets. However, she did gain weight during the summer of 2021 and no significant further loss since then. \par - Given her newly diagnosed  DM, the desire to loose weight, she was started on Ozempic, received 3 injections prior the 11/2021 visit. Continued same dose and marylou receiving as "sample" from PCP b/o insurance issues. It was uptitrated but she had lapse again b/o lapse in insurance and she self reduced dose from fear of running out of it. Discussed potential side effects.\par - Denied Hx of medullary thyroid carcinoma or FHx of medullary thyroid carcinoma. Denies Hx of MEN2, no thyroid ca, no pheochromocytoma. \par - Patient to follow for DM mgmt, with PCP. Started to see Dr. Phelps, endocrinology, appreciated. \par - Patient has new insurance, thus nutritionist referral placed again. Prior insurance did not cover. Has not seen yet.\par - C/w low dose statin.\par \par \par Mild to moderate chronic hepatitis, suggestive of AIH based on biopsy or AI pattern drug induced\par - meds reviewed,  neither of them is linked to AI type liver injury\par - ZAIRA was neg (except once 1:80), ASMA weak pos 1:20, with repeat neg, SLA and LKM neg, IgG was elevated but < 1.1x ULN and now WNL\par - Liver enzymes were minimally elevated, AST 41, ALT 42, , repeat AST 31, ALT 32, but \par - Based on above did not meet criteria of treatment for AIH, been monitored (Quantiferon neg, TPMT normal), especially that steroid could worsen her weight, DM etc.\par - Per biopsy report, paucity of bile ducts, could not r/o PBC,PSC, but no typical histological findings, AMA neg, IgM nl, ANCA neg; PBC specific ZAIRA was ordered, but never resulted. Since  ALP remained elevated, started on Ursodiol in 2/2021. No significant change in ALP, but no side effects, c/w Ursodiol for now\par - Rest of liver workup unrevealing\par - US  11/2020 with hepatic steatosis, s/p cholecystectomy; MRCP 5/4/21 fatty liver, otherwise unremarkable\par - Discussed with Dr. Miles: patient was on letrozole, but not since 8/2020, now being on exemestane\par - Rheum referral placed b/o pos ZAIRA.\par \par Advanced fibrosis\par - no ascites, no PSE, F3 fibrosis, but given signs of CLD, will q 6 months HCC surveillance, AFP WNL (10/2021), US abd 11/2021 w/o focal hepatic lesion, next was due 5/2022, overdue, ordered MR/MRCP and advised to schedule ASAP.\par - Hep A and B immune\par - MRCP non revealing (5/2021)\par - 13.3 kPa and 334 CAP score on Fibroscan 1/20/21, will repeat now\par \par Osteopenia\par - C/w on vit D calcium\par \par Bone pain, hx of breast ca (although was local), ALP elevation, with nl GGT and now nl transaminases\par - Bone scan neg for metastatic disease\par \par Maculopapular skin lesions\par - F/u with dermatology\par \par Chest pressure  \par - resolved, reportedly was seen by cardiology\par \par \par Bloating, GERD\par  - GI ref, H. pylori\par \par RTC 1 month b/o overdue / pending tests\par \par \par

## 2022-06-26 NOTE — PHYSICAL EXAM
[Spider Angioma] : Spider angioma(s) was(were) observed [Non-Tender] : non-tender [Palmar Erythema] : Palmar Erythema [General Appearance - Alert] : alert [General Appearance - In No Acute Distress] : in no acute distress [General Appearance - Well Nourished] : well nourished [General Appearance - Well Developed] : well developed [Sclera] : the sclera and conjunctiva were normal [Oropharynx] : the oropharynx was normal [Neck Appearance] : the appearance of the neck was normal [Jugular Venous Distention Increased] : there was no jugular-venous distention [Respiration, Rhythm And Depth] : normal respiratory rhythm and effort [Exaggerated Use Of Accessory Muscles For Inspiration] : no accessory muscle use [Auscultation Breath Sounds / Voice Sounds] : lungs were clear to auscultation bilaterally [Heart Rate And Rhythm] : heart rate was normal and rhythm regular [Heart Sounds] : normal S1 and S2 [Edema] : there was no peripheral edema [Bowel Sounds] : normal bowel sounds [Abdomen Soft] : soft [Abdomen Tenderness] : non-tender [] : no hepato-splenomegaly [Abdomen Mass (___ Cm)] : no abdominal mass palpated [Abdomen Hernia] : no hernia was discovered [Cervical Lymph Nodes Enlarged Posterior Bilaterally] : posterior cervical [Cervical Lymph Nodes Enlarged Anterior Bilaterally] : anterior cervical [Supraclavicular Lymph Nodes Enlarged Bilaterally] : supraclavicular [Axillary Lymph Nodes Enlarged Bilaterally] : axillary [Inguinal Lymph Nodes Enlarged Bilaterally] : inguinal [No CVA Tenderness] : no ~M costovertebral angle tenderness [No Spinal Tenderness] : no spinal tenderness [Abnormal Walk] : normal gait [Oriented To Time, Place, And Person] : oriented to person, place, and time [Impaired Insight] : insight and judgment were intact [Affect] : the affect was normal [Mood] : the mood was normal [Memory Recent] : recent memory was not impaired [Memory Remote] : remote memory was not impaired [Scleral Icterus] : No Scleral Icterus [Hepatojugular Reflux] : patient did not have a sustained hepatojugular reflux [Abdominal Bruit] : no abdominal bruit [Abdominal  Ascites] : no ascites [Asterixis] : no asterixis observed [Jaundice] : No jaundice [Depression] : no depression [Hallucinations] : ~T no ~M hallucinations [Delusions] : no ~T delusions [Suicidal Ideation] : no suicidal ideation [Homicidal Ideation] : no homicidal ideations [Preoccupiation With Violent Thoughts] : no preoccupation with violent thoughts [FreeTextEntry1] : Grossly intact

## 2022-06-26 NOTE — HISTORY OF PRESENT ILLNESS
[Needlestick Exposure] : no needlestick exposure [Infected Sexual Partner] : no infected sexual partner [IV Drug Use] : no IV drug use [Tattoo] : no tattoos [Body Piercing] : no body piercing [Hemodialysis] : no hemodialysis [Transfusion before 1992] : no transfusion before 1992 [Transplant before 1992] : no transplant before 1992 [Incarceration] : no incarceration [Alcohol Abuse] : no alcohol abuse [Autoimmune Disorder] : no autoimmune disorder [Household Contact to HBV] : no household contact to HBV [Travel to Endemic Area] : no travel to an endemic area [Occupational Exposure] : no occupational exposure [Cocaine Use] : no cocaine use [de-identified] : Patient stated that do not need , preferred discussion in English.\par \par 55 yo obese (BMI 35->39)  Female with Hx of breast cancer (Dx in 2014, s/p b/l mastectomy, s/p chemo-RT, was on anastrazole, as per patient was changed by his oncologist, Dr. Miles), colon polyps, Hx of cholecystectomy (gallstones), Hx of parathyroidectomy (no records, per patient was told to have "benign tumor"), glaucoma, recent conjunctivitis, s/p liver biopsy by gastroenterologist, Dr. Alejandre ([phone]) because of abnormal liver tests. No liver test records available from that time. Biopsy was done 9/24/2020 at [facility]. The biopsy reports mild steatohepatitis, also moderate chronic inflammation and interface hepatitis (grade 2-3 of 4) and septal fibrous expansion and focal bridging fibrosis (stage 3-4). \par Patient was not started on any medication for liver disease. Patient was sent for second opinion from Dr Miles`s office.  \par \par Biopsy samples were obtained and reviewed by Dr. Reji De La Cruz liver pathologist at [facility]: Moderate steatohepatitis and Mild-to moderate chronic hepatitis with differential of AIH or AI pattern induced by drug, and while no features, but PBC or PSC could not be ruled out either. There was bile duct paucity. There was F3 fibrosis. FibroScan also showed S3 steatosis and F3 fibrosis.\par \par Patient`s liver enzymes were WNL except ALP. AI markers showed neg ZAIRA, minimally elevated IgG (< 1.1x or WNL) , weak pos ASMA (1:20), neg anti LKM and neg anti SLA. AMA was neg and IgM was nl. \par Given the normal AI serology and normal transaminases, she was not started on any immunosuppressant. Given the mild elevation of ALP, and some bile duct paucity, she was started on Ursodiol, although there were no distinct features of PBC on liver biopsy, but as per report "could not be excluded".  \par MRI abd w/wo contrast and MRCP in 5/2021 showed unremarkable bile ducts and fatty liver. \par She had bone scan, b/o Hx of breast cancer and solely ALP elevation, and it was negative for metastatic disease. \par ALP has not changed significantly on Ursodiol.\par \par She denied any prior Hx of liver disease, FHx of liver disease, has obesity, being born in [facility] but no other risk factors. Denied OTC or herbal meds/supplements. Denied alcohol. \par She was noted to have few spider naevi, palmar erythema. \par \par S/p Moderna 1st 3/1/21 2nd 3/29/21, had high fever for 3 days. \par She has been following dermatology or maculopapular lesion, small, red, on extremities. Reportedly, she was told to have psoriasis, only on topical treatment.\par \par She had colonoscopy on 8/4/21:  6 mm transverse colon sessile polyp, 7 mm cecum polyp, 3 mm polyp rectum. No biopsy result on file, repeat was recommended in 3 years.\par \par In 9/2021 she was noted significant weight gain and developed diabetes. Previously she was prediabetic. She reported that her PCP applied for Ozempic in past but was not approved (was prediabetic that time). \par \par Given her obesity and DM, applied for Ozempic and was approved. She was started on it 11/2021 and reported that her appetite decreased since then, and has not experience other side effects. She changed her insurance from 12/1/21 and b/o that lost follow up. She has not seen any hepatology interim, RTC in 3/2021 and then had lapse in insurance again. \par She had COVID-19 in 1/2022.\par \par She is here for follow up. She had lapse in insurance after 3/2022 visit again (see task list). Reports that she was given Ozempic samples by endocrinology. Reports that she reduced Ozempic dose  back to 0.25 because was not sure that enough till her endocrinologist returns. Reports fasting blood sugar around 90, and does not measure post meal. She lost 5 lb compared to Nov 2021, but none since the beginning of the year. [de-identified] : [facility] (moved in [place], last traveled >6 years ago)

## 2022-06-26 NOTE — REVIEW OF SYSTEMS
[Heartburn] : heartburn [Skin Lesions] : skin lesion [Negative] : Heme/Lymph [Fever] : no fever [Chills] : no chills [Feeling Poorly] : not feeling poorly [Feeling Tired] : not feeling tired [Abdominal Pain] : no abdominal pain [Vomiting] : no vomiting [Constipation] : no constipation [Diarrhea] : no diarrhea [Melena] : no melena [Dysuria] : no dysuria [Incontinence] : no incontinence [FreeTextEntry7] : Hx of reflux, controlled with diet and on famotidine [FreeTextEntry8] : urinary frequency for 3 years, being followed by Urology [FreeTextEntry9] : Knee pain and reports "bone pain" in lower extremities [de-identified] : small, circular, red, maculopapular lesions on extremities - following with dermatology [de-identified] : Hx of depression

## 2022-06-26 NOTE — REASON FOR VISIT
[Follow-Up: _____] : a [unfilled] follow-up visit [FreeTextEntry1] : BYERS+AIH+ PBC (?) with advanced fibrosis

## 2022-07-05 ENCOUNTER — APPOINTMENT (OUTPATIENT)
Dept: ENDOCRINOLOGY | Facility: CLINIC | Age: 57
End: 2022-07-05

## 2022-07-05 PROCEDURE — T1013: CPT

## 2022-07-05 PROCEDURE — G0108 DIAB MANAGE TRN  PER INDIV: CPT

## 2022-07-11 ENCOUNTER — APPOINTMENT (OUTPATIENT)
Dept: MRI IMAGING | Facility: CLINIC | Age: 57
End: 2022-07-11

## 2022-07-12 ENCOUNTER — APPOINTMENT (OUTPATIENT)
Dept: GASTROENTEROLOGY | Facility: HOSPITAL | Age: 57
End: 2022-07-12

## 2022-07-19 ENCOUNTER — APPOINTMENT (OUTPATIENT)
Dept: ENDOCRINOLOGY | Facility: CLINIC | Age: 57
End: 2022-07-19

## 2022-08-26 ENCOUNTER — OUTPATIENT (OUTPATIENT)
Dept: OUTPATIENT SERVICES | Facility: HOSPITAL | Age: 57
LOS: 1 days | End: 2022-08-26
Payer: COMMERCIAL

## 2022-08-26 ENCOUNTER — APPOINTMENT (OUTPATIENT)
Dept: MRI IMAGING | Facility: CLINIC | Age: 57
End: 2022-08-26

## 2022-08-26 DIAGNOSIS — R97.8 OTHER ABNORMAL TUMOR MARKERS: ICD-10-CM

## 2022-08-26 DIAGNOSIS — K74.00 HEPATIC FIBROSIS, UNSPECIFIED: ICD-10-CM

## 2022-08-26 PROCEDURE — 74183 MRI ABD W/O CNTR FLWD CNTR: CPT | Mod: 26

## 2022-08-26 PROCEDURE — A9585: CPT

## 2022-08-26 PROCEDURE — 74183 MRI ABD W/O CNTR FLWD CNTR: CPT

## 2022-08-31 ENCOUNTER — APPOINTMENT (OUTPATIENT)
Dept: GASTROENTEROLOGY | Facility: CLINIC | Age: 57
End: 2022-08-31

## 2022-08-31 VITALS
WEIGHT: 172 LBS | HEART RATE: 101 BPM | TEMPERATURE: 96.7 F | OXYGEN SATURATION: 97 % | SYSTOLIC BLOOD PRESSURE: 116 MMHG | HEIGHT: 58 IN | DIASTOLIC BLOOD PRESSURE: 79 MMHG | BODY MASS INDEX: 36.11 KG/M2

## 2022-08-31 DIAGNOSIS — R11.0 NAUSEA: ICD-10-CM

## 2022-08-31 DIAGNOSIS — Z01.818 ENCOUNTER FOR OTHER PREPROCEDURAL EXAMINATION: ICD-10-CM

## 2022-08-31 DIAGNOSIS — K59.00 CONSTIPATION, UNSPECIFIED: ICD-10-CM

## 2022-08-31 PROCEDURE — 99214 OFFICE O/P EST MOD 30 MIN: CPT

## 2022-08-31 NOTE — HISTORY OF PRESENT ILLNESS
[None] : had no significant interval events [Vomiting] : denies vomiting [Diarrhea] : denies diarrhea [Yellow Skin Or Eyes (Jaundice)] : denies jaundice [Heartburn] : heartburn [Nausea] : nausea [Constipation] : constipation [Abdominal Pain] : abdominal pain [Abdominal Swelling] : abdominal swelling [Rectal Pain] : rectal pain [GERD] : gastroesophageal reflux disease [Hiatus Hernia] : hiatus hernia [Wt Gain ___ Lbs] : no recent weight gain [Wt Loss ___ Lbs] : no recent weight loss [Peptic Ulcer Disease] : no peptic ulcer disease [Pancreatitis] : no pancreatitis [Cholelithiasis] : no cholelithiasis [Kidney Stone] : no kidney stone [Inflammatory Bowel Disease] : no inflammatory bowel disease [Irritable Bowel Syndrome] : no irritable bowel syndrome [Diverticulitis] : no diverticulitis [Alcohol Abuse] : no alcohol abuse [Malignancy] : no malignancy [Abdominal Surgery] : no abdominal surgery [Appendectomy] : no appendectomy [Cholecystectomy] : no cholecystectomy [de-identified] : The patient has a history significant for right breast cancer, s/p bilateral mastectomy in 2014, s/p chemotherapy and radiation therapy, autoimmune hepatitis vs. 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  The patient has a history of colonic polyps. The patient has a history of liver disease autoimmune hepatitis. The patient has a history of fatty liver noted on prior imaging study. The patient denies any prior exposure to hepatitis A, B or C. The patient denies any large doses of nonsteroidal anti-inflammatory drugs or acetaminophen. The patient denies sharing needles, razors, nail clippers, nail files, scissors, et cetera. The patient denies any EtOH abuse, cocaine use or intravenous drug use. The patient denies any prior blood transfusions, sexual indiscretions, tattoos or piercing. The patient admits to having prior surgery. The history of surgery is significant for a prior cholecystectomy, bilateral mastectomy and ?parathyroidectomy. The patient denies any family history of GI or liver problems. The patient states that she is feeling better. The patient denies any jaundice or pruritus.  The patient denies any chronic lower back pain. The patient complains of abdominal pain.  The patient describes the abdominal pain as a sharp, intermittent midepigastric and periumbilical discomfort that is nonradiating in nature.  The abdominal pain is worse with stress.  The abdominal pain is worse with meals and improves with passing gas or having a bowel movement.  The abdominal pain is described as moderate in nature.  The abdominal pain occurs at night and in the morning.  The abdominal pain can occur at any time.   The abdominal pain has awakened the patient from sleep.  The abdominal pain is slightly relieved with certain medication such as proton pump inhibitors, H2 blockers and antacids.  The abdominal pain is associated with abdominal gas and bloating. The patient complains of occasional nausea but denies any vomiting.  The patient complains of gastroesophageal reflux disease and occasional dysphagia.  The dysphagia is worse with solids but unrelated to liquids. The gastroesophageal reflux disease is worse after meals and late at night and in the early morning. The gastroesophageal reflux disease is slightly improved with proton pump inhibitors, H2 blockers and antacids.   The patient denies any atypical chest pain, shortness of breath or palpitations.  The patient denies any diaphoresis. The patient complains of occasional constipation but denies any diarrhea.  The patient has 1 bowel movement every other day. The patient complains of a change in bowel habits.  The patient complains of a change in caliber of stool. The patient denies a change in bowel habits.  The patient denies a change in caliber of stool.  The patient denies having mucus discharge with the bowel movements.  The patient denies any bright red blood per rectum, melena or hematemesis.  The rectal bleeding is associated with diarrhea and constipation and internal hemorrhoids.  The patient complains of rectal pain and rectal pruritus.  The patient denies any weight loss or anorexia.  She denies any fevers or chills.  The blood work performed on June 22, 2022 revealed a normal PT/INR of 12.7/1.08, respectively, a normal alpha-fetoprotein level of 7.8 ng/mL, a normal total cholesterol/triglyceride level of 177/129 mg/dL, respectively, elevated liver enzymes with alkaline phosphatase/AST/ALT/GGTP of 146/46/54/U/L, respectively, a normal total bilirubin of 0.2 mg/dL, a normal total protein/albumin level of 7.8/3.8 g/dL, respectively, no evidence of anemia with a hemoglobin/hematocrit level of 13.3/43.0, respectively, and elevated hemoglobin A1c of 5.8% with an estimated average glucose of 120 mg/dL, and elevated anti-smooth muscle antibody of 1: 20, and elevated ZAIRA of 1: 320, the Haji score is 0.50 the abdominal ultrasound performed on November 15, 2021 revealed diffusely echogenic liver which can be seen in fatty infiltration or parenchymal liver disease with no focal masses.  Status postcholecystectomy.  There is a pancreatic tail was obscured by bowel gas but otherwise unremarkable examination.  The MRI/MRCP of the abdomen with and without IV contrast performed on August 30, 2022 revealed enlarged, fatty liver.  Subtle contour nodularity/induration with no evidence of portal hypertension.  There were no liver lesions. The prior liver biopsy performed at [facility] on September 24, 2020 revealed mild steatohepatitis, also moderate chronic inflammation and interface hepatitis (grade 2-3 of 4) and septal fibrous expansion and focal bridging fibrosis (stage 3-4). Biopsy samples were obtained and reviewed by Dr. Reji De La Cruz liver pathologist at [facility]: Moderate steatohepatitis and Mild-to moderate chronic hepatitis with differential of AIH or AI pattern induced by drug, and while no features, but PBC or PSC could not be ruled out either. There was bile duct paucity. There was F3 fibrosis. The fibroScan performed on March 30, 2022 revealed median shear wave speed of 2.00 m/s that corresponds to a median liver stiffness score of 12.0 kPa. With an IQ R/MED of 17%, a  dB/m (S3 steatosis and F3 bridging fibrosis). The MRI of the liver with and without IV contrast performed on May 4, 2021 revealed unremarkable MRI of the pancreas, mildly enlarged liver with associated fatty infiltration, status post right mastectomy, status postcholecystectomy. The abdominal ultrasound performed on November 15, 2021 revealed diffusely echogenic liver which can be seen in fatty infiltration or parenchymal liver disease with no focal masses. Also noted was status post cholecystectomy and pancreatic tail obscured by bowel gas otherwise unremarkable examination. The chest x-ray performed on September 13, 2021 revealed an unremarkable chest x-ray. The bone scan performed on April 21, 2021 revealed no bone scan evidence of metastatic disease to the bone. The blood work performed on March 11, 2022 revealed a normal total cholesterol/triglyceride level of 185/132 mg/dL, respectively, a normal total bilirubin of 0.2 mg/dL, a normal alkaline phosphatase/AST/ALT of 154/54/65 U/L, respectively, and elevated CPK of 196 U/L, a normal PT/INR of 13.3/1.14 no evidence of anemia with a hemoglobin/hematocrit level of 13.8/44.0, respectively, and elevated hemoglobin A1c of 5.8%, with an estimated average glucose of 120 mg/dL, and elevated alpha-fetoprotein level of 8.6 ng/mL, a normal antimitochondrial antibody of <1: 20, and elevated anti-smooth muscle antibody of 1: 20, a negative ZAIRA. The patient admits to having a prior upper endoscopy and colonoscopy performed by another gastroenterologist, Dr. Venkat Alejandre and Dr. Jerome Ochoa. According to the patient, the upper endoscopic findings revealed gastritis and peptic ulcer disease. The colonoscopic findings revealed colonic polyps. The patient denies any significant family history of GI problems.  [de-identified] : dysphagia [de-identified] : (-) smoking, (-) ETOH, (-) IVDA\par

## 2022-08-31 NOTE — ASSESSMENT
[FreeTextEntry1] : Abdominal Pain: The patient complains of abdominal pain. The patient is to avoid nonsteroidal anti-inflammatory drugs and aspirin. I recommend a trial of Pantoprazole 40 mg once a day for 3 months for the symptoms.  \par Dyspepsia: The patient complains of dyspeptic symptoms.  The patient was advised to continue to abide by an anti-gas (low FOD-MAP) diet.  The patient was previously given a pamphlet for anti-gas (low FOD-MAP).  The patient and I reviewed the anti-gas (low FOD-MAP)diet at length again. The patient is to continue on a trial of Simethicone one tablet 4 times a day p.r.n. abdominal pain and gas.\par GERD: The patient was advised to avoid late-night meals and dietary indiscretions.  The patient was advised to avoid fried and fatty foods.  The patient was advised to abide by an anti-GERD diet. The patient was given a pamphlet for anti-GERD.  The patient and I reviewed the anti-GERD diet at length. I recommend a trial of Pantoprazole 40 mg once a day x 3 months for the symptoms.\par Nausea: The patient complains of nausea. If the symptoms of nausea persists, the patient may require a trial of Zofran  mg twice a day. \par Constipation: The patient complains of constipation. I recommend a high-fiber diet. I recommend a trial of a probiotic such as Align once a day. I recommend a trial of Metamucil once a day for fiber supplementation. I recommend a trial of Miralax 1 packet once a day for the constipation.  The patient agreed and will followup to reassess the symptoms.  \par Autoimmune Hepatitis: The prior liver biopsy performed at [facility] on September 24, 2020 revealed mild steatohepatitis, also moderate chronic inflammation and interface hepatitis (grade 2-3 of 4) and septal fibrous expansion and focal bridging fibrosis (stage 3-4). Biopsy samples were obtained and reviewed by Dr. Reji De La Cruz liver pathologist at [facility]: Moderate steatohepatitis and Mild-to moderate chronic hepatitis with differential of AIH or AI pattern induced by drug, and while no features, but PBC or PSC could not be ruled out either. There was bile duct paucity. There was F3 fibrosis. The fibroScan performed on March 30, 2022 revealed median shear wave speed of 2.00 m/s that corresponds to a median liver stiffness score of 12.0 kPa. With an IQ R/MED of 17%, a  dB/m (S3 steatosis and F3 bridging fibrosis). I recommend followup with her hepatologist, Dr. Ryann Weir for close monitoring.\par Prior Endoscopic Procedures: The patient had a prior upper endoscopy and colonoscopy performed by another gastroenterologist, Dr. Venkat Alejandre and Dr. Jerome Ochoa. I will try to obtain the prior upper endoscopy and colonoscopy reports. The patient is to sign a record release to obtain those records.\par History of Colonic Polyps: The patient has a history of colonic polyps.  I recommend a repeat colonoscopy to reassess for colonic polyps.  The patient was told of the risks and benefits of the procedure.  The patient was told of the risks of perforation, emergency surgery, bleeding, infections and missed lesions.  The patient is to be on a clear liquid diet the entire day prior to the procedure. The patient is to complete the entire prescribed bowel prep the day prior to the procedure as directed. The patient is to have a COVID 19 PCR nasopharyngeal swab performed at the approved sites 3 days prior to the procedure.  The patient is told not to drive, drink alcohol, use recreational drugs, exercise, or work the day of the procedure.  The patient was told of the need for an escort to accompany the patient home after the procedure. The patient is aware that the procedure may be cancelled if they fail to follow the directions.  The patient agreed and will schedule for the procedure. The patient can take the antihypertensive medication with a sip of water one hour prior to the procedure. The patient is to hold the diabetic medication the day before and the morning of the procedure. The patient is to be n.p.o. after midnight and bowel prep was given.  The patient is to return for the procedure. \par Upper Endoscopy: I recommend an upper endoscopy to assess for peptic ulcer disease versus esophagitis. The patient was told of the risks and benefits of the procedure. The patient was told of the risks of perforation, emergency surgery, bleeding, infections and missed lesions. The patient is to have a COVID 19 PCR nasopharyngeal swab performed at the approved sites 3 days prior to the procedure. The patient is told not to drive, drink alcohol, use recreational drugs, exercise, or work the day of the procedure. The patient was told of the need for an escort to accompany the patient home after the procedure. The patient is aware that the procedure may be cancelled if they fail to follow the directions. The patient agreed and will schedule for the procedure. The patient is to be n.p.o. after midnight. The patient is to return for the procedure.\par Follow-up: The patient is to follow-up in the office in 4 weeks to reassess the symptoms. The patient was told to call the office if any further problems. \par \par \par

## 2022-09-06 ENCOUNTER — APPOINTMENT (OUTPATIENT)
Dept: ENDOCRINOLOGY | Facility: CLINIC | Age: 57
End: 2022-09-06

## 2022-09-06 PROCEDURE — G0108 DIAB MANAGE TRN  PER INDIV: CPT

## 2022-09-16 ENCOUNTER — APPOINTMENT (OUTPATIENT)
Dept: ENDOCRINOLOGY | Facility: CLINIC | Age: 57
End: 2022-09-16

## 2022-09-16 DIAGNOSIS — Z86.39 PERSONAL HISTORY OF OTHER ENDOCRINE, NUTRITIONAL AND METABOLIC DISEASE: ICD-10-CM

## 2022-09-16 DIAGNOSIS — M85.80 OTHER SPECIFIED DISORDERS OF BONE DENSITY AND STRUCTURE, UNSPECIFIED SITE: ICD-10-CM

## 2022-09-16 PROCEDURE — 99212 OFFICE O/P EST SF 10 MIN: CPT | Mod: 95

## 2022-09-20 PROBLEM — M85.80 OSTEOPENIA: Status: ACTIVE | Noted: 2021-01-15

## 2022-09-20 PROBLEM — Z86.39 HISTORY OF THYROID CYST: Status: RESOLVED | Noted: 2022-04-19 | Resolved: 2022-09-20

## 2022-09-20 NOTE — REVIEW OF SYSTEMS
[Fatigue] : fatigue [Headaches] : headaches [All other systems negative] : All other systems negative

## 2022-09-20 NOTE — ASSESSMENT
[FreeTextEntry1] : This is a 56 year old female with T2DM, thyroid nodule status post right lobectomy, per patient benign, hyperlipidemia, osteopenia, BYERS/AIH, breast cancer s/p bilateral mastectomy/chemotherapy/RT, cataracts, GERD.\par Check hemoglobin A1c.  Will consider increasing Ozempic to 2 mg weekly.\par Last ophthalmologist exam was march 2022 and she denies retinopathy. \par She reports neuropathy. \par Check urine microalbumin to evaluate for nephropathy..\par Check lipid panel for hyperlipidemia.  Continue rosuvastatin 5 mg daily pending results.\par She is not on an ACE inhibitor or ARB.  She does not have hypertension.\par For osteopenia, check DEXA in December 2022.

## 2022-09-20 NOTE — HISTORY OF PRESENT ILLNESS
[FreeTextEntry1] : CC: Diabetes\par This is a 56 year old female with T2DM, thyroid nodule status post right lobectomy, per patient benign, hyperlipidemia, osteopenia, BYERS/AIH, breast cancer s/p bilateral mastectomy/chemotherapy/RT, cataracts, GERD.\par Diabetes was diagnosed in 2021.\par She is on Ozempic 1mg q week. \par She SMBG 1 x day. F low 100s, bedtime 90-100s.\par Last ophthalmologist exam was march 2022 and she denies retinopathy. \par She reports neuropathy. \par Denies nephropathy. \par She is on rosuvastatin 5mg qd.\par She is not on an ACE inhibitor or ARB.

## 2022-09-20 NOTE — REASON FOR VISIT
[Home] : at home, [unfilled] , at the time of the visit. [Medical Office: ([facility])___] : at the medical office located in  [Patient] : the patient [Self] : self [Follow - Up] : a follow-up visit [DM Type 2] : DM Type 2

## 2022-10-21 ENCOUNTER — APPOINTMENT (OUTPATIENT)
Dept: ENDOCRINOLOGY | Facility: CLINIC | Age: 57
End: 2022-10-21

## 2022-10-25 LAB — SARS-COV-2 N GENE NPH QL NAA+PROBE: NOT DETECTED

## 2022-10-27 ENCOUNTER — APPOINTMENT (OUTPATIENT)
Dept: GASTROENTEROLOGY | Facility: HOSPITAL | Age: 57
End: 2022-10-27

## 2022-10-27 ENCOUNTER — OUTPATIENT (OUTPATIENT)
Dept: OUTPATIENT SERVICES | Facility: HOSPITAL | Age: 57
LOS: 1 days | End: 2022-10-27
Payer: COMMERCIAL

## 2022-10-27 ENCOUNTER — TRANSCRIPTION ENCOUNTER (OUTPATIENT)
Age: 57
End: 2022-10-27

## 2022-10-27 ENCOUNTER — RESULT REVIEW (OUTPATIENT)
Age: 57
End: 2022-10-27

## 2022-10-27 VITALS
SYSTOLIC BLOOD PRESSURE: 109 MMHG | HEART RATE: 68 BPM | OXYGEN SATURATION: 99 % | DIASTOLIC BLOOD PRESSURE: 71 MMHG | RESPIRATION RATE: 14 BRPM

## 2022-10-27 VITALS
HEIGHT: 58 IN | HEART RATE: 74 BPM | OXYGEN SATURATION: 100 % | WEIGHT: 171.96 LBS | RESPIRATION RATE: 74 BRPM | DIASTOLIC BLOOD PRESSURE: 75 MMHG | TEMPERATURE: 98 F | SYSTOLIC BLOOD PRESSURE: 112 MMHG

## 2022-10-27 DIAGNOSIS — R10.13 EPIGASTRIC PAIN: ICD-10-CM

## 2022-10-27 DIAGNOSIS — Z90.89 ACQUIRED ABSENCE OF OTHER ORGANS: Chronic | ICD-10-CM

## 2022-10-27 DIAGNOSIS — R10.84 GENERALIZED ABDOMINAL PAIN: ICD-10-CM

## 2022-10-27 DIAGNOSIS — K21.9 GASTRO-ESOPHAGEAL REFLUX DISEASE WITHOUT ESOPHAGITIS: ICD-10-CM

## 2022-10-27 LAB — GLUCOSE BLDC GLUCOMTR-MCNC: 89 MG/DL — SIGNIFICANT CHANGE UP (ref 70–99)

## 2022-10-27 PROCEDURE — 88312 SPECIAL STAINS GROUP 1: CPT | Mod: 26

## 2022-10-27 PROCEDURE — 43239 EGD BIOPSY SINGLE/MULTIPLE: CPT

## 2022-10-27 PROCEDURE — 82962 GLUCOSE BLOOD TEST: CPT

## 2022-10-27 PROCEDURE — 88305 TISSUE EXAM BY PATHOLOGIST: CPT

## 2022-10-27 PROCEDURE — 88312 SPECIAL STAINS GROUP 1: CPT

## 2022-10-27 PROCEDURE — 45378 DIAGNOSTIC COLONOSCOPY: CPT

## 2022-10-27 PROCEDURE — 88305 TISSUE EXAM BY PATHOLOGIST: CPT | Mod: 26

## 2022-10-27 PROCEDURE — G0105: CPT

## 2022-10-27 RX ORDER — SODIUM CHLORIDE 9 MG/ML
500 INJECTION INTRAMUSCULAR; INTRAVENOUS; SUBCUTANEOUS
Refills: 0 | Status: DISCONTINUED | OUTPATIENT
Start: 2022-10-27 | End: 2022-11-10

## 2022-10-27 NOTE — ASU DISCHARGE PLAN (ADULT/PEDIATRIC) - NS MD DC FALL RISK RISK
For information on Fall & Injury Prevention, visit: [email or web address] OR  [email or web address] OR  https://www.cdc.gov/steadi/patient.html

## 2022-10-27 NOTE — ASU PATIENT PROFILE, ADULT - FALL HARM RISK - FACTORS NURSING JUDGEMENT
seen and examined  no distress  lying comfortable       Standing Inpatient Medications  atorvastatin 10 milliGRAM(s) Oral at bedtime  dextrose 5% + sodium chloride 0.45%. 1000 milliLiter(s) IV Continuous <Continuous>  escitalopram 10 milliGRAM(s) Oral daily  heparin  Injectable 5000 Unit(s) SubCutaneous every 12 hours  insulin Infusion 5 Unit(s)/Hr IV Continuous <Continuous>  levothyroxine 100 MICROGram(s) Oral daily  meropenem  IVPB 500 milliGRAM(s) IV Intermittent every 24 hours  metoprolol succinate ER 50 milliGRAM(s) Oral daily          VITALS/PHYSICAL EXAM  --------------------------------------------------------------------------------  T(C): 37.1 (05-02-18 @ 04:00), Max: 37.1 (05-02-18 @ 04:00)  HR: 70 (05-02-18 @ 07:34) (62 - 84)  BP: 72/42 (05-02-18 @ 07:34) (72/42 - 124/63)  RR: 19 (05-02-18 @ 07:34) (13 - 36)  SpO2: 92% (05-02-18 @ 07:34) (92% - 99%)  Wt(kg): --  Height (cm): 162.56 (05-01-18 @ 02:15)  Weight (kg): 50.9 (05-01-18 @ 02:15)  BMI (kg/m2): 19.3 (05-01-18 @ 02:15)  BSA (m2): 1.53 (05-01-18 @ 02:15)      05-01-18 @ 07:01  -  05-02-18 @ 07:00  --------------------------------------------------------  IN: 2567 mL / OUT: 722 mL / NET: 1845 mL      Physical Exam:  	Gen: NAD  	Pulm:decrease BS  B/L  	CV: S1S2; no rub  	Abd: nontender/nondistended  	LE:  no edema      LABS/STUDIES  --------------------------------------------------------------------------------              7.3    18.29 >-----------<  337      [05-02-18 @ 04:27]              21.2     137  |  103  |  120  ----------------------------<  51      [05-02-18 @ 04:27]  5.5   |  19  |  5.6        Ca     7.0     [05-02-18 @ 04:27]      Mg     2.2     [05-01-18 @ 21:01]      Phos  5.4     [05-01-18 @ 21:01]    TPro  5.4  /  Alb  3.0  /  TBili  0.3  /  DBili  x   /  AST  10  /  ALT  7   /  AlkPhos  294  [04-30-18 @ 16:59]        Troponin <0.01      [05-01-18 @ 12:53]  CK 12      [05-01-18 @ 12:53]    Creatinine Trend:  SCr 5.6 [05-02 @ 04:27]  SCr 5.9 [05-01 @ 21:01]  SCr 5.9 [05-01 @ 17:29]  SCr 4.3 [05-01 @ 12:53]  SCr 6.1 [05-01 @ 04:17] No

## 2022-10-27 NOTE — ASU PATIENT PROFILE, ADULT - NSICDXPASTMEDICALHX_GEN_ALL_CORE_FT
PAST MEDICAL HISTORY:  Atrial fibrillation     Autoimmune hepatitis     Breast cancer, right     Colon polyps     Diabetes mellitus     GERD (gastroesophageal reflux disease)     Hypercholesteremia     Obesity, morbid

## 2022-10-31 LAB — SURGICAL PATHOLOGY STUDY: SIGNIFICANT CHANGE UP

## 2022-11-01 ENCOUNTER — NON-APPOINTMENT (OUTPATIENT)
Age: 57
End: 2022-11-01

## 2022-11-02 ENCOUNTER — APPOINTMENT (OUTPATIENT)
Dept: HEPATOLOGY | Facility: CLINIC | Age: 57
End: 2022-11-02

## 2022-12-02 PROBLEM — K75.4 AUTOIMMUNE HEPATITIS: Chronic | Status: ACTIVE | Noted: 2022-10-27

## 2022-12-02 PROBLEM — K21.9 GASTRO-ESOPHAGEAL REFLUX DISEASE WITHOUT ESOPHAGITIS: Chronic | Status: ACTIVE | Noted: 2022-10-27

## 2022-12-02 PROBLEM — E78.00 PURE HYPERCHOLESTEROLEMIA, UNSPECIFIED: Chronic | Status: ACTIVE | Noted: 2022-10-27

## 2022-12-02 PROBLEM — E11.9 TYPE 2 DIABETES MELLITUS WITHOUT COMPLICATIONS: Chronic | Status: ACTIVE | Noted: 2022-10-27

## 2022-12-02 PROBLEM — C50.911 MALIGNANT NEOPLASM OF UNSPECIFIED SITE OF RIGHT FEMALE BREAST: Chronic | Status: ACTIVE | Noted: 2022-10-27

## 2022-12-02 PROBLEM — E66.01 MORBID (SEVERE) OBESITY DUE TO EXCESS CALORIES: Chronic | Status: ACTIVE | Noted: 2022-10-27

## 2022-12-02 PROBLEM — I48.91 UNSPECIFIED ATRIAL FIBRILLATION: Chronic | Status: ACTIVE | Noted: 2022-10-27

## 2022-12-02 PROBLEM — K63.5 POLYP OF COLON: Chronic | Status: ACTIVE | Noted: 2022-10-27

## 2023-01-09 ENCOUNTER — APPOINTMENT (OUTPATIENT)
Dept: GASTROENTEROLOGY | Facility: CLINIC | Age: 58
End: 2023-01-09
Payer: MEDICARE

## 2023-01-09 VITALS
DIASTOLIC BLOOD PRESSURE: 68 MMHG | OXYGEN SATURATION: 96 % | WEIGHT: 177 LBS | HEART RATE: 71 BPM | BODY MASS INDEX: 37.16 KG/M2 | TEMPERATURE: 97.9 F | SYSTOLIC BLOOD PRESSURE: 101 MMHG | HEIGHT: 58 IN

## 2023-01-09 DIAGNOSIS — K64.8 OTHER HEMORRHOIDS: ICD-10-CM

## 2023-01-09 DIAGNOSIS — K29.30 CHRONIC SUPERFICIAL GASTRITIS W/OUT BLEEDING: ICD-10-CM

## 2023-01-09 PROCEDURE — 99214 OFFICE O/P EST MOD 30 MIN: CPT

## 2023-01-09 NOTE — HISTORY OF PRESENT ILLNESS
[de-identified] : The upper endoscopy performed at the [facility] GI endoscopy suite on October 27, 2022 revealed mild diffuse bile gastritis. The gastric pathology performed on October 27, 2022 revealed esophageal mucosa with fragments of unremarkable squamous esophageal epithelium with a PASF stain that is negative for fungal microorganisms (esophagus), gastric antral mucosa with chronic inactive gastritis that was negative for Helicobacter pylori (antrum), gastric body mucosa with chronic inactive gastritis that was negative for Helicobacter pylori and a gastric fundic gland polyp (body of the stomach) and duodenal mucosa with preserved villous architecture with no parasites or increased intraepithelial lymphocytes identified (duodenum). \par  [FreeTextEntry1] : The MRI/MRCP of the abdomen with and without IV contrast performed on August 30, 2022 revealed enlarged, fatty liver. Subtle contour nodularity/induration with no evidence of portal hypertension. \par The MRI of the liver with and without IV contrast performed on May 4, 2021 revealed unremarkable MRI of the pancreas, mildly enlarged liver with associated fatty infiltration, status post right mastectomy, status postcholecystectomy.\par  [de-identified] : The abdominal ultrasound performed on November 15, 2021 revealed diffusely echogenic liver which can be seen in fatty infiltration or parenchymal liver disease with no focal masses. Status postcholecystectomy. There is a pancreatic tail was obscured by bowel gas but otherwise unremarkable examination. \par  [de-identified] : The fibroScan performed on March 30, 2022 revealed median shear wave speed of 2.00 m/s that corresponds to a median liver stiffness score of 12.0 kPa. With an IQ R/MED of 17%, a  dB/m (S3 steatosis and F3 bridging fibrosis). \par \par \par The bone scan performed on April 21, 2021 revealed no bone scan evidence of metastatic disease to the bone.\par \par The chest x-ray performed on September 13, 2021 revealed an unremarkable chest x-ray. \par

## 2023-01-09 NOTE — ASSESSMENT
[FreeTextEntry1] : Abdominal Pain: The patient complains of abdominal pain. The patient is to avoid nonsteroidal anti-inflammatory drugs and aspirin.  I recommend a low FOD-MAP diet.  I recommend a trial of Dicyclomine 10 mg tablet PO 3 times a day PRN for the abdominal pain.\par Dyspepsia: The patient complains of dyspeptic symptoms.  The patient was advised to continue to abide by an anti-gas (low FOD-MAP) diet.  The patient was previously given a pamphlet for anti-gas (low FOD-MAP).  The patient and I reviewed the anti-gas (low FOD-MAP)diet at length again. The patient is to continue on a trial of Simethicone one tablet 4 times a day p.r.n. abdominal pain and gas.\par GERD: The patient was advised to avoid late-night meals and dietary indiscretions.  The patient was advised to avoid fried and fatty foods.  The patient was advised to abide by an anti-GERD diet. The patient was given a pamphlet for anti-GERD.  The patient and I reviewed the anti-GERD diet at length. I recommend a trial of Sucralfate suspension 1 gram/10 cc 4 times a day x 3 months for the symptoms.\par Diarrhea: The patient complains of diarrhea.  I recommend a low residue diet. The patient is to avoid fiber supplementation. The patient is to consider starting a trial of a probiotic such as Align once a day.   I recommend sending stool studies for C+S, O+P x3, and C. difficile to assess for an infectious etiology of the diarrhea.  The symptoms are worse after meals.  The patient has a history of cholecystectomy.  I recommend a trial of cholestyramine one packet once a day for possible bile induced diarrhea.  The patient agreed and will follow-up to reassess the symptoms.  \par Gastritis: The patient has a history of gastritis noted on prior upper endoscopy. The patient is to avoid nonsteroidal anti-inflammatory drugs and aspirin.  I recommend a trial of Sucralfate suspension 1 gram/10 cc 4 times a day x 3 months for the symptoms.\par Internal Hemorrhoids: The patient is to consider a trial of Anusol H. C. suppositories one per rectum nightly and Anusol HC2 .5% cream apply to affected area twice a day p.r.n. hemorrhoidal bleeding or pain. I also recommend a trial of Calmoseptine cream apply to affected area twice a day for hemorrhoidal discomfort.  I recommend Tucks pads for the hemorrhoids.  I recommend Sitz baths twice a day for the hemorrhoids.  I recommend avoid wearing tight underwear and use boxers.  I recommend avoid touching the perineal area.  The patient agreed to followup. \par History of Colonic Polyps: The patient has a history of colonic polyps.  I recommend a repeat colonoscopy in 5 years to reassess for colonic polyps unless symptomatic.  The patient agreed and will follow up for the procedure. \par Autoimmune Hepatitis: The prior liver biopsy performed at [facility] on September 24, 2020 revealed mild steatohepatitis, also moderate chronic inflammation and interface hepatitis (grade 2-3 of 4) and septal fibrous expansion and focal bridging fibrosis (stage 3-4). Biopsy samples were obtained and reviewed by Dr. Reji De La Cruz liver pathologist at [facility]: Moderate steatohepatitis and Mild-to moderate chronic hepatitis with differential of AIH or AI pattern induced by drug, and while no features, but PBC or PSC could not be ruled out either. There was bile duct paucity. There was F3 fibrosis. The fibroScan performed on March 30, 2022 revealed median shear wave speed of 2.00 m/s that corresponds to a median liver stiffness score of 12.0 kPa. With an IQ R/MED of 17%, a  dB/m (S3 steatosis and F3 bridging fibrosis). I recommend followup with her hepatologist, Dr. Ryann Weir for close monitoring.\par Prior Endoscopic Procedures: The patient had a prior upper endoscopy and colonoscopy performed by another gastroenterologist, Dr. Venkat Alejandre and Dr. Jerome Ochoa. I will try to obtain the prior upper endoscopy and colonoscopy reports. The patient is to sign another record release to obtain those records.\par Follow-up: The patient is to follow-up in the office in 3 months to reassess the symptoms. The patient was told to call the office if any further problems. \par \par \par \par

## 2023-01-18 ENCOUNTER — APPOINTMENT (OUTPATIENT)
Dept: HEPATOLOGY | Facility: CLINIC | Age: 58
End: 2023-01-18
Payer: MEDICARE

## 2023-01-18 VITALS
BODY MASS INDEX: 36.53 KG/M2 | HEIGHT: 58 IN | TEMPERATURE: 97.6 F | SYSTOLIC BLOOD PRESSURE: 112 MMHG | DIASTOLIC BLOOD PRESSURE: 75 MMHG | HEART RATE: 69 BPM | RESPIRATION RATE: 16 BRPM | WEIGHT: 174 LBS | OXYGEN SATURATION: 98 %

## 2023-01-18 PROCEDURE — 99214 OFFICE O/P EST MOD 30 MIN: CPT

## 2023-01-27 NOTE — ASSESSMENT
[FreeTextEntry1] : 56 yo Female with Hx of breast cancer (Dx in 2014, s/p b/l mastectomy, s/p chemo-RT, was on anastrazole, switched to exemestane as per her oncologist, Dr. Miles in 2021), obesity (BMI 35->39), colon polyps, Hx of cholecystectomy (gallstones), Hx of parathyroidectomy (no records, per patient was told to have "benign tumor"), glaucoma, recent conjunctivitis, was sent for liver biopsy by gastroenterologist, Dr. Alejandre ([phone]) because of abnormal liver tests. Biopsy was done 9/24/2020 at [facility]. The biopsy reports mild steatohepatitis, also moderate chronic inflammation and interface hepatitis (grade 2-3 of 4) and septal fibrous expansion and focal bridging fibrosis (stage 3-4). Reviewed with Liver pathologist and the consensus was : moderate steatohepatitis, mild-to moderate chronic hepatitis with differential of AIH or AI pattern induced by drug, and while no features, but PBC or PSC could not be ruled out either. There was bile duct paucity. There was F3 fibrosis. FibroScan also showed S3 steatosis and F3 fibrosis.\par \par \par Moderate steatohepatitis \par - Discussed natural Hx of fatty liver and life style modifications in details\par - FibroScan S3, F3\par - Congratulated on efforts and willingness on diet, exercise, and discussed again that balanced diet is preferred over drastic diets. However, she did gain weight during the summer of 2021 and no significant further loss since then. \par - Given her newly diagnosed  DM, the desire to loose weight, she was started on Ozempic, received 3 injections prior the 11/2021 visit. Continued same dose and marylou receiving as "sample" from PCP b/o insurance issues. It was uptitrated but she had lapse again b/o lapse in insurance and she self reduced dose from fear of running out of it. Discussed potential side effects.\par - Denied Hx of medullary thyroid carcinoma or FHx of medullary thyroid carcinoma. Denies Hx of MEN2, no thyroid ca, no pheochromocytoma. \par - Patient to follow for DM mgmt, with PCP. Started to see Dr. Phelps, endocrinology, appreciated. \par - Patient has new insurance, thus nutritionist referral placed again. Prior insurance did not cover. Has not seen yet.\par - C/w low dose statin.\par \par \par Mild to moderate chronic hepatitis, suggestive of AIH based on biopsy or AI pattern drug induced\par - meds reviewed,  neither of them is linked to AI type liver injury\par - ZAIRA was neg (except once 1:80), ASMA weak pos 1:20, with repeat neg, SLA and LKM neg, IgG was elevated but < 1.1x ULN and now WNL\par - Liver enzymes were minimally elevated, AST 41, ALT 42, , repeat AST 31, ALT 32, but \par - Based on above did not meet criteria of treatment for AIH, been monitored (Quantiferon neg, TPMT normal), especially that steroid could worsen her weight, DM etc.\par - Per biopsy report, paucity of bile ducts, could not r/o PBC,PSC, but no typical histological findings, AMA neg, IgM nl, ANCA neg; PBC specific ZAIRA was ordered, but never resulted. Since  ALP remained elevated, started on Ursodiol in 2/2021. No significant change in ALP, but no side effects, c/w Ursodiol for now\par - Rest of liver workup unrevealing\par - US  11/2020 with hepatic steatosis, s/p cholecystectomy; MRCP 5/4/21 fatty liver, otherwise unremarkable\par - Discussed with Dr. Miles: patient was on letrozole, but not since 8/2020, now being on exemestane\par - Rheum referral placed b/o pos ZAIRA.\par \par Advanced fibrosis\par - no ascites, no PSE, F3 fibrosis, but given signs of CLD, will q 6 months HCC surveillance, AFP WNL (10/2021), US abd 11/2021 w/o focal hepatic lesion, MRI abd 8/2022 w/o focal lesion, next due 2/2023\par - Hep A and B immune\par - MRCP non revealing (5/2021)\par - 13.3 kPa and 334 CAP score on Fibroscan 1/20/21, will repeat now\par \par Osteopenia\par - C/w on vit D calcium\par \par Bone pain, hx of breast ca (although was local), ALP elevation, with nl GGT and now nl transaminases\par - Bone scan neg for metastatic disease\par \par Maculopapular skin lesions\par - F/u with dermatology\par \par Chest pressure  \par - resolved, reportedly was seen by cardiology\par \par \par Bloating, GERD\par  - GI ref, H. pylori\par \par RTC 3 months\par \par \par

## 2023-01-27 NOTE — REVIEW OF SYSTEMS
[Heartburn] : heartburn [Skin Lesions] : skin lesion [Negative] : Heme/Lymph [Fever] : no fever [Chills] : no chills [Feeling Poorly] : not feeling poorly [Feeling Tired] : not feeling tired [Abdominal Pain] : no abdominal pain [Vomiting] : no vomiting [Constipation] : no constipation [Diarrhea] : no diarrhea [Melena] : no melena [Dysuria] : no dysuria [Incontinence] : no incontinence [FreeTextEntry7] : Hx of reflux, controlled with diet and on famotidine [FreeTextEntry8] : urinary frequency for 3 years, being followed by Urology [FreeTextEntry9] : Knee pain and reports "bone pain" in lower extremities [de-identified] : small, circular, red, maculopapular lesions on extremities - following with dermatology [de-identified] : Hx of depression

## 2023-01-27 NOTE — HISTORY OF PRESENT ILLNESS
[Needlestick Exposure] : no needlestick exposure [Infected Sexual Partner] : no infected sexual partner [IV Drug Use] : no IV drug use [Tattoo] : no tattoos [Body Piercing] : no body piercing [Hemodialysis] : no hemodialysis [Transfusion before 1992] : no transfusion before 1992 [Transplant before 1992] : no transplant before 1992 [Incarceration] : no incarceration [Alcohol Abuse] : no alcohol abuse [Autoimmune Disorder] : no autoimmune disorder [Household Contact to HBV] : no household contact to HBV [Travel to Endemic Area] : no travel to an endemic area [Occupational Exposure] : no occupational exposure [Cocaine Use] : no cocaine use [de-identified] : Patient stated that do not need , preferred discussion in English.\par \par 56 yo obese (BMI 35->39)  Female with Hx of breast cancer (Dx in 2014, s/p b/l mastectomy, s/p chemo-RT, was on anastrazole, as per patient was changed by his oncologist, Dr. Miles), colon polyps, Hx of cholecystectomy (gallstones), Hx of parathyroidectomy (no records, per patient was told to have "benign tumor"), glaucoma, recent conjunctivitis, s/p liver biopsy by gastroenterologist, Dr. Alejandre ([phone]) because of abnormal liver tests. No liver test records available from that time. Biopsy was done 9/24/2020 at [facility]. The biopsy reports mild steatohepatitis, also moderate chronic inflammation and interface hepatitis (grade 2-3 of 4) and septal fibrous expansion and focal bridging fibrosis (stage 3-4). \par Patient was not started on any medication for liver disease. Patient was sent for second opinion from Dr Milse`s office.  \par \par Biopsy samples were obtained and reviewed by Dr. Reji De La Cruz liver pathologist at [facility]: Moderate steatohepatitis and Mild-to moderate chronic hepatitis with differential of AIH or AI pattern induced by drug, and while no features, but PBC or PSC could not be ruled out either. There was bile duct paucity. There was F3 fibrosis. FibroScan also showed S3 steatosis and F3 fibrosis.\par \par Patient`s liver enzymes were WNL except ALP. AI markers showed neg ZAIRA, minimally elevated IgG (< 1.1x or WNL) , weak pos ASMA (1:20), neg anti LKM and neg anti SLA. AMA was neg and IgM was nl. \par Given the normal AI serology and normal transaminases, she was not started on any immunosuppressant. Given the mild elevation of ALP, and some bile duct paucity, she was started on Ursodiol, although there were no distinct features of PBC on liver biopsy, but as per report "could not be excluded".  \par MRI abd w/wo contrast and MRCP in 5/2021 showed unremarkable bile ducts and fatty liver. \par She had bone scan, b/o Hx of breast cancer and solely ALP elevation, and it was negative for metastatic disease. \par ALP has not changed significantly on Ursodiol.\par \par She denied any prior Hx of liver disease, FHx of liver disease, has obesity, being born in [facility] but no other risk factors. Denied OTC or herbal meds/supplements. Denied alcohol. \par She was noted to have few spider naevi, palmar erythema. \par \par S/p Moderna 1st 3/1/21 2nd 3/29/21, had high fever for 3 days. \par She has been following dermatology or maculopapular lesion, small, red, on extremities. Reportedly, she was told to have psoriasis, only on topical treatment.\par \par She had colonoscopy on 8/4/21:  6 mm transverse colon sessile polyp, 7 mm cecum polyp, 3 mm polyp rectum. No biopsy result on file, repeat was recommended in 3 years.\par \par In 9/2021 she was noted significant weight gain and developed diabetes. Previously she was prediabetic. She reported that her PCP applied for Ozempic in past but was not approved (was prediabetic that time). \par \par Given her obesity and DM, applied for Ozempic and was approved. She was started on it 11/2021 and reported that her appetite decreased since then, and has not experience other side effects. She changed her insurance from 12/1/21 and b/o that lost follow up. She has not seen any hepatology interim, RTC in 3/2021 and then had lapse in insurance again. \par She had COVID-19 in 1/2022.\par \par She had lapse in insurance after 3/2022 visit again (see task list). Reported that she was given Ozempic samples by endocrinology. Reported that she reduced Ozempic dose  back to 0.25 because was not sure that enough till her endocrinologist returns. Reports fasting blood sugar around 90, and does not measure post meal. She lost 5 lb compared to Nov 2021, but none since the beginning of the year.\par \par She is here for follow up. No new complaints. Awaiting disability.  [de-identified] : Born [facility] (moved in [place], last traveled >6 years ago)

## 2023-04-09 RX ORDER — SEMAGLUTIDE 1.34 MG/ML
4 INJECTION, SOLUTION SUBCUTANEOUS
Qty: 1 | Refills: 0 | Status: COMPLETED | COMMUNITY
Start: 2022-07-05 | End: 2023-07-06

## 2023-04-12 ENCOUNTER — APPOINTMENT (OUTPATIENT)
Dept: GASTROENTEROLOGY | Facility: CLINIC | Age: 58
End: 2023-04-12
Payer: MEDICAID

## 2023-04-12 ENCOUNTER — APPOINTMENT (OUTPATIENT)
Dept: HEPATOLOGY | Facility: CLINIC | Age: 58
End: 2023-04-12
Payer: MEDICARE

## 2023-04-12 VITALS
BODY MASS INDEX: 36.11 KG/M2 | HEART RATE: 105 BPM | SYSTOLIC BLOOD PRESSURE: 106 MMHG | OXYGEN SATURATION: 96 % | TEMPERATURE: 97.5 F | DIASTOLIC BLOOD PRESSURE: 74 MMHG | HEIGHT: 58 IN | WEIGHT: 172 LBS | RESPIRATION RATE: 16 BRPM

## 2023-04-12 DIAGNOSIS — Z87.898 PERSONAL HISTORY OF OTHER SPECIFIED CONDITIONS: ICD-10-CM

## 2023-04-12 DIAGNOSIS — R74.8 ABNORMAL LEVELS OF OTHER SERUM ENZYMES: ICD-10-CM

## 2023-04-12 LAB
25(OH)D3 SERPL-MCNC: 28 NG/ML
AFP-TM SERPL-MCNC: 7.4 NG/ML
ALBUMIN SERPL ELPH-MCNC: 4.3 G/DL
ALP BLD-CCNC: 135 U/L
ALP BONE SERPL-MCNC: 33.5 UG/L
ALT SERPL-CCNC: 54 U/L
ANA SER IF-ACNC: NEGATIVE
ANION GAP SERPL CALC-SCNC: 11 MMOL/L
AST SERPL-CCNC: 44 U/L
BASOPHILS # BLD AUTO: 0.03 K/UL
BASOPHILS NFR BLD AUTO: 0.5 %
BILIRUB DIRECT SERPL-MCNC: 0.1 MG/DL
BILIRUB INDIRECT SERPL-MCNC: 0.2 MG/DL
BILIRUB SERPL-MCNC: 0.2 MG/DL
BUN SERPL-MCNC: 15 MG/DL
CALCIUM SERPL-MCNC: 9.5 MG/DL
CHLORIDE SERPL-SCNC: 103 MMOL/L
CK SERPL-CCNC: 208 U/L
CO2 SERPL-SCNC: 26 MMOL/L
CREAT SERPL-MCNC: 0.7 MG/DL
DEPRECATED KAPPA LC FREE/LAMBDA SER: 1.53 RATIO
EGFR: 100 ML/MIN/1.73M2
EOSINOPHIL # BLD AUTO: 0.09 K/UL
EOSINOPHIL NFR BLD AUTO: 1.4 %
GLUCOSE SERPL-MCNC: 85 MG/DL
HCT VFR BLD CALC: 43.1 %
HGB BLD-MCNC: 13.8 G/DL
IGA SER QL IEP: 456 MG/DL
IGG SER QL IEP: 1460 MG/DL
IGM SER QL IEP: 83 MG/DL
IMM GRANULOCYTES NFR BLD AUTO: 0.2 %
INR PPP: 1.08 RATIO
KAPPA LC CSF-MCNC: 1.58 MG/DL
KAPPA LC SERPL-MCNC: 2.42 MG/DL
LKM AB SER QL IF: <20.1 UNITS
LYMPHOCYTES # BLD AUTO: 2.68 K/UL
LYMPHOCYTES NFR BLD AUTO: 40.7 %
MAN DIFF?: NORMAL
MCHC RBC-ENTMCNC: 29.3 PG
MCHC RBC-ENTMCNC: 32 GM/DL
MCV RBC AUTO: 91.5 FL
MITOCHONDRIA AB SER IF-ACNC: NORMAL
MONOCYTES # BLD AUTO: 0.33 K/UL
MONOCYTES NFR BLD AUTO: 5 %
NEUTROPHILS # BLD AUTO: 3.44 K/UL
NEUTROPHILS NFR BLD AUTO: 52.2 %
PLATELET # BLD AUTO: 154 K/UL
POTASSIUM SERPL-SCNC: 4.2 MMOL/L
PROT SERPL-MCNC: 7.5 G/DL
PT BLD: 12.7 SEC
RAPID RVP RESULT: NOT DETECTED
RBC # BLD: 4.71 M/UL
RBC # FLD: 13.9 %
SARS-COV-2 RNA PNL RESP NAA+PROBE: NOT DETECTED
SMOOTH MUSCLE AB SER QL IF: NORMAL
SODIUM SERPL-SCNC: 139 MMOL/L
WBC # FLD AUTO: 6.58 K/UL

## 2023-04-12 PROCEDURE — 99214 OFFICE O/P EST MOD 30 MIN: CPT

## 2023-04-12 NOTE — ASSESSMENT
[FreeTextEntry1] : Dyspepsia: The patient complains of dyspeptic symptoms.  The patient was advised to continue to abide by an anti-gas (low FOD-MAP) diet.  The patient was previously given a pamphlet for anti-gas (low FOD-MAP).  The patient and I reviewed the anti-gas (low FOD-MAP)diet at length again. The patient is to continue on a trial of Simethicone one tablet 4 times a day p.r.n. abdominal pain and gas.   I recommend a trial of Dicyclomine 10 mg tablet PO 3 times a day PRN for the abdominal pain.\par GERD: The patient was advised to avoid late-night meals and dietary indiscretions.  The patient was advised to avoid fried and fatty foods.  The patient was advised to abide by an anti-GERD diet. The patient was given a pamphlet for anti-GERD.  The patient and I reviewed the anti-GERD diet at length. I recommend continue on  a trial of omeprazole 40 mg once a day x 3 months for the symptoms.\par Diarrhea: The patient complains of diarrhea.  I recommend a low residue diet. The patient is to avoid fiber supplementation. The patient is to consider starting a trial of a probiotic such as Align once a day.  If the symptoms persist, the patient may require sending stool studies for C+S, O+P x3, and C. difficile to assess for an infectious etiology of the diarrhea.  The symptoms are worse after meals.  The patient has a history of cholecystectomy.  I recommend a trial of cholestyramine one packet once a day for possible bile induced diarrhea. If the symptoms persist, the patient may require a colonoscopy to assess for colitis versus other causes.  The patient was told of the risks and benefits of the procedure.  The patient was told of the risks of perforation, emergency surgery, bleeding, infections and missed lesions.  The patient agreed and will follow-up to reassess the symptoms.  \par Gastritis: The patient has a history of gastritis noted on prior upper endoscopy. The patient is to avoid nonsteroidal anti-inflammatory drugs and aspirin. I recommend continue on  a trial of omeprazole 40 mg once a day x 3 months for the symptoms.\par Internal Hemorrhoids: The patient is to consider a trial of Anusol H. C. suppositories one per rectum nightly and Anusol HC2.5% cream apply to affected area twice a day p.r.n. hemorrhoidal bleeding or pain. I also recommend a trial of Calmoseptine cream apply to affected area twice a day for hemorrhoidal discomfort. I recommend Tucks pads for the hemorrhoids. I recommend Sitz baths twice a day for the hemorrhoids. I recommend avoid wearing tight underwear and use boxers. I recommend avoid touching the perineal area. The patient agreed to followup. \par History of Colonic Polyps: The patient has a history of colonic polyps. I recommend a repeat colonoscopy in 5 years to reassess for colonic polyps unless symptomatic. The patient agreed and will follow up for the procedure. \par Autoimmune Hepatitis: The prior liver biopsy performed at [facility] on September 24, 2020 revealed mild steatohepatitis, also moderate chronic inflammation and interface hepatitis (grade 2-3 of 4) and septal fibrous expansion and focal bridging fibrosis (stage 3-4). Biopsy samples were obtained and reviewed by Dr. Reji De La Cruz liver pathologist at [facility]: Moderate steatohepatitis and Mild-to moderate chronic hepatitis with differential of AIH or AI pattern induced by drug, and while no features, but PBC or PSC could not be ruled out either. There was bile duct paucity. There was F3 fibrosis. The fibroScan performed on March 30, 2022 revealed median shear wave speed of 2.00 m/s that corresponds to a median liver stiffness score of 12.0 kPa. With an IQ R/MED of 17%, a  dB/m (S3 steatosis and F3 bridging fibrosis). I recommend followup with her hepatologist, Dr. Ryann Weir for close monitoring.\par Prior Endoscopic Procedures: The patient had a prior upper endoscopy and colonoscopy performed by another gastroenterologist, Dr. Venkat Alejandre and Dr. Jerome Ochoa. I will try to obtain the prior upper endoscopy and colonoscopy reports. The patient is to sign another record release to obtain those records.\par Prior Hospitalization: The patient was previously hospitalized at the [facility] for chest pain and shortness of breath.   The patient had blood work and imaging studies to assess the symptoms.  I will try to obtain the hospital records, blood work and imaging studies performed in the hospital.  The patient is to sign a record release to obtain those records.\par Follow-up: The patient is to follow-up in the office in 6 months to reassess the symptoms. The patient was told to call the office if any further problems. \par \par

## 2023-04-12 NOTE — HISTORY OF PRESENT ILLNESS
[de-identified] : The upper endoscopy performed at the [facility] GI endoscopy suite on October 27, 2022 revealed mild diffuse bile gastritis. The gastric pathology performed on October 27, 2022 revealed esophageal mucosa with fragments of unremarkable squamous esophageal epithelium with a PASF stain that is negative for fungal microorganisms (esophagus), gastric antral mucosa with chronic inactive gastritis that was negative for Helicobacter pylori (antrum), gastric body mucosa with chronic inactive gastritis that was negative for Helicobacter pylori and a gastric fundic gland polyp (body of the stomach) and duodenal mucosa with preserved villous architecture with no parasites or increased intraepithelial lymphocytes identified (duodenum). \par  [FreeTextEntry1] : The MRI/MRCP of the abdomen with and without IV contrast performed on August 30, 2022 revealed enlarged, fatty liver. Subtle contour nodularity/induration with no evidence of portal hypertension. \par The MRI of the liver with and without IV contrast performed on May 4, 2021 revealed unremarkable MRI of the pancreas, mildly enlarged liver with associated fatty infiltration, status post right mastectomy, status postcholecystectomy.\par  [de-identified] : The abdominal ultrasound performed on November 15, 2021 revealed diffusely echogenic liver which can be seen in fatty infiltration or parenchymal liver disease with no focal masses. Status postcholecystectomy. There is a pancreatic tail was obscured by bowel gas but otherwise unremarkable examination. \par  [de-identified] : The fibroScan performed on March 30, 2022 revealed median shear wave speed of 2.00 m/s that corresponds to a median liver stiffness score of 12.0 kPa. With an IQ R/MED of 17%, a  dB/m (S3 steatosis and F3 bridging fibrosis). \par \par \par The bone scan performed on April 21, 2021 revealed no bone scan evidence of metastatic disease to the bone.\par \par The chest x-ray performed on September 13, 2021 revealed an unremarkable chest x-ray. \par

## 2023-04-17 PROBLEM — R74.8 ELEVATED ALKALINE PHOSPHATASE LEVEL: Status: ACTIVE | Noted: 2021-02-19

## 2023-04-18 NOTE — ASSESSMENT
[FreeTextEntry1] : 56 yo Female with Hx of breast cancer (Dx in 2014, s/p b/l mastectomy, s/p chemo-RT, was on anastrazole, switched to exemestane as per her oncologist, Dr. Miles in 2021), obesity (BMI 35->39), colon polyps, Hx of cholecystectomy (gallstones), Hx of parathyroidectomy (no records, per patient was told to have "benign tumor"), glaucoma, recent conjunctivitis, was sent for liver biopsy by gastroenterologist, Dr. Alejandre ([phone]) because of abnormal liver tests. Biopsy was done 9/24/2020 at [facility]. The biopsy reports mild steatohepatitis, also moderate chronic inflammation and interface hepatitis (grade 2-3 of 4) and septal fibrous expansion and focal bridging fibrosis (stage 3-4). Reviewed with Liver pathologist and the consensus was : moderate steatohepatitis, mild-to moderate chronic hepatitis with differential of AIH or AI pattern induced by drug, and while no features, but PBC or PSC could not be ruled out either. There was bile duct paucity. There was F3 fibrosis. FibroScan also showed S3 steatosis and F3 fibrosis.\par \par \par Moderate steatohepatitis \par - Discussed natural Hx of fatty liver and life style modifications in details\par - FibroScan S3, F3\par - C/w diet, exercise.\par - She was started on Ozempic in 10/2021. C/w Ozempic per endocrinology. Patient would like to switch endocrinology provider. Denied Hx of medullary thyroid carcinoma or FHx of medullary thyroid carcinoma. Denies Hx of MEN2, no thyroid ca, no pheochromocytoma. \par - C/w low dose statin.\par \par \par Mild to moderate chronic hepatitis, suggestive of AIH based on biopsy or AI pattern drug induced\par - meds reviewed,  neither of them is linked to AI type liver injury\par - ZAIRA was neg (except once 1:80), ASMA weak pos 1:20, with repeat neg, SLA and LKM neg, IgG was elevated but < 1.1x ULN and now WNL\par - Liver enzymes were minimally elevated, AST 41, ALT 42, , repeat AST 31, ALT 32, but \par - Based on above did not meet criteria of treatment for AIH, been monitored (Quantiferon neg, TPMT normal), especially that steroid could worsen her weight, DM etc.\par - Per biopsy report, paucity of bile ducts, could not r/o PBC,PSC, but no typical histological findings, AMA neg, IgM nl, ANCA neg; PBC specific ZAIRA was ordered, but never resulted. Since  ALP remained elevated, started on Ursodiol in 2/2021. No significant change in ALP, but no side effects, c/w Ursodiol for now\par - Rest of liver workup unrevealing\par - US  11/2020 with hepatic steatosis, s/p cholecystectomy; MRCP 5/4/21 fatty liver, otherwise unremarkable\par - Discussed with Dr. Miles: patient was on letrozole, but not since 8/2020, now being on exemestane\par - Rheum referral placed previosuly b/o pos ZAIRA, not seen yet.\par \par Advanced fibrosis\par - no ascites, no PSE, F3 fibrosis, but given signs of CLD, will q 6 months HCC surveillance, AFP WNL (10/2021), US abd 11/2021 w/o focal hepatic lesion, MRI abd 8/2022 w/o focal lesion, next due 2/2023\par - Hep A and B immune\par - MRCP non revealing (5/2021, 8/2022)\par - 13.3 kPa and 334 CAP score on Fibroscan 1/20/21, similar in 3/2022\par \par Osteopenia\par - C/w on vit D calcium\par \par Bone pain, hx of breast ca (although was local), ALP elevation, with nl GGT and now nl transaminases\par - Bone scan neg for metastatic disease\par \par Maculopapular skin lesions\par - F/u with dermatology\par \par \par Bloating, GERD\par  -  f/u w/ GI\par \par RTC 3 months, but patient to call office, reports had PET CT, will review result and depending will order MRI abd for HCC screening\par \par \par Addedndum 4/12 visit:\par Pulmonology referral, Dr. Miles has hospital records from [facility]\par Spoke to Dr. Miles, he will send us PET CT result. Patient is due for HCC surveillance.\par She would like to switch endocrinology,  to schedule her w/ Dr. Yee. \par Will get LFTs. \par C/w Ursodiol, C/w Ozempic, but no weight loss yet.

## 2023-04-18 NOTE — REVIEW OF SYSTEMS
[Heartburn] : heartburn [Skin Lesions] : skin lesion [Negative] : Heme/Lymph [Fever] : no fever [Chills] : no chills [Feeling Poorly] : not feeling poorly [Feeling Tired] : not feeling tired [Abdominal Pain] : no abdominal pain [Vomiting] : no vomiting [Constipation] : no constipation [Diarrhea] : no diarrhea [Melena] : no melena [Dysuria] : no dysuria [Incontinence] : no incontinence [FreeTextEntry8] : urinary frequency for 3 years, being followed by Urology [FreeTextEntry7] : Hx of reflux, controlled with diet and on famotidine [FreeTextEntry9] : Knee pain and reports "bone pain" in lower extremities [de-identified] : small, circular, red, maculopapular lesions on extremities - following with dermatology [de-identified] : Hx of depression

## 2023-04-18 NOTE — HISTORY OF PRESENT ILLNESS
[Needlestick Exposure] : no needlestick exposure [Infected Sexual Partner] : no infected sexual partner [IV Drug Use] : no IV drug use [Tattoo] : no tattoos [Body Piercing] : no body piercing [Hemodialysis] : no hemodialysis [Transfusion before 1992] : no transfusion before 1992 [Incarceration] : no incarceration [Transplant before 1992] : no transplant before 1992 [Alcohol Abuse] : no alcohol abuse [Autoimmune Disorder] : no autoimmune disorder [Household Contact to HBV] : no household contact to HBV [Travel to Endemic Area] : no travel to an endemic area [Occupational Exposure] : no occupational exposure [Cocaine Use] : no cocaine use [de-identified] : Born [facility] (moved in 1991, last traveled >6 years ago) [de-identified] : Patient stated that do not need , preferred discussion in English.\par \par 56 yo obese (BMI 35->39)  Female with Hx of breast cancer (Dx in 2014, s/p b/l mastectomy, s/p chemo-RT, was on anastrazole, as per patient was changed by his oncologist, Dr. Miles), colon polyps, Hx of cholecystectomy (gallstones), Hx of parathyroidectomy (no records, per patient was told to have "benign tumor"), glaucoma, recent conjunctivitis, s/p liver biopsy by gastroenterologist, Dr. Alejandre ([phone]) because of abnormal liver tests. No liver test records available from that time. Biopsy was done 9/24/2020 at [facility]. The biopsy reports mild steatohepatitis, also moderate chronic inflammation and interface hepatitis (grade 2-3 of 4) and septal fibrous expansion and focal bridging fibrosis (stage 3-4). \par Patient was not started on any medication for liver disease. Patient was sent for second opinion from Dr Miles`s office.  \par \par Biopsy samples were obtained and reviewed by Dr. Reji De La Cruz liver pathologist at [facility]: Moderate steatohepatitis and Mild-to moderate chronic hepatitis with differential of AIH or AI pattern induced by drug, and while no features, but PBC or PSC could not be ruled out either. There was bile duct paucity. There was F3 fibrosis. FibroScan also showed S3 steatosis and F3 fibrosis.\par \par Patient`s liver enzymes were WNL except ALP. AI markers showed neg ZAIRA, minimally elevated IgG (< 1.1x or WNL) , weak pos ASMA (1:20), neg anti LKM and neg anti SLA. AMA was neg and IgM was nl. \par Given the normal AI serology and normal transaminases, she was not started on any immunosuppressant. Given the mild elevation of ALP, and some bile duct paucity, she was started on Ursodiol, although there were no distinct features of PBC on liver biopsy, but as per report "could not be excluded".  \par MRI abd w/wo contrast and MRCP in 5/2021 showed unremarkable bile ducts and fatty liver. \par She had bone scan, b/o Hx of breast cancer and solely ALP elevation, and it was negative for metastatic disease. \par ALP has not changed significantly on Ursodiol.\par \par She denied any prior Hx of liver disease, FHx of liver disease, has obesity, being born in [facility] but no other risk factors. Denied OTC or herbal meds/supplements. Denied alcohol. \par She was noted to have few spider naevi, palmar erythema. \par \par S/p Moderna 1st 3/1/21 2nd 3/29/21, had high fever for 3 days. \par She has been following dermatology or maculopapular lesion, small, red, on extremities. Reportedly, she was told to have psoriasis, only on topical treatment.\par \par She had colonoscopy on 8/4/21:  6 mm transverse colon sessile polyp, 7 mm cecum polyp, 3 mm polyp rectum. No biopsy result on file, repeat was recommended in 3 years.\par \par In 9/2021 she was noted significant weight gain and developed diabetes. Previously she was prediabetic. She reported that her PCP applied for Ozempic in past but was not approved (was prediabetic that time). \par \par Given her obesity and DM, applied for Ozempic and was approved. She was started on it 11/2021 and reported that her appetite decreased since then, and has not experience other side effects. She changed her insurance from 12/1/21 and b/o that lost follow up. She has not seen any hepatology interim, RTC in 3/2021 and then had lapse in insurance again. \par She had COVID-19 in 1/2022.\par \par She had lapse in insurance after 3/2022 visit again (see task list). Reported that she was given Ozempic samples by endocrinology. Reported that she reduced Ozempic dose  back to 0.25 because was not sure that enough till her endocrinologist returns. Reports fasting blood sugar around 90, and does not measure post meal. She lost 5 lb compared to Nov 2021, but none since the beginning of the year.\par \par 1/2023 visit No new complaints. Awaiting disability. \par \par She is here for follow up. She was hospitalized at [facility] b/o fever, chest tightness. No records. Methylprednisolone 4 mg, QVar inhaler,  Albuterol inhaler. Reports that was seeing Austyn Castillo outpatient was treated with antibiotics, but did not improve and was sent to hospital reportedly b/o hypoxia in 80s (around March 21 st). \par He completed methylprednisolone. \par PET scan was done 4/11/23, no report yet, was ordered by Dr. Miles. \par \par

## 2023-04-26 ENCOUNTER — APPOINTMENT (OUTPATIENT)
Dept: ENDOCRINOLOGY | Facility: CLINIC | Age: 58
End: 2023-04-26
Payer: MEDICARE

## 2023-04-26 DIAGNOSIS — L65.9 NONSCARRING HAIR LOSS, UNSPECIFIED: ICD-10-CM

## 2023-04-26 DIAGNOSIS — E04.1 NONTOXIC SINGLE THYROID NODULE: ICD-10-CM

## 2023-04-26 PROCEDURE — 99205 OFFICE O/P NEW HI 60 MIN: CPT | Mod: 95

## 2023-04-26 PROCEDURE — 99215 OFFICE O/P EST HI 40 MIN: CPT | Mod: 95

## 2023-04-26 NOTE — ASSESSMENT
[FreeTextEntry1] : 1) Type 2 Diabetes\par Last HBA1C was in June 2022 which was 5.8%\par Patient is only taking Ozempic 1 mg weekly, denies using metformin.  Discussed with patient that Ozempic 1 mg is a statin that does and she should not be of low afraid that with this is a high dose.  Her blood sugars are at goal on this current regimen.  Ozempic will also help in lowering her weight therefore will not correct meant to stop the Ozempic.\par \par PLAN:\par -Continue Ozempic 1 mg weekly\par - Advised patient to measure sugars randomly once a day have a log book of the numbers. Patient to bring log book prior to next Endocrine appointment\par -Ophthalmology referral for diabetic retinopathy surveillance\par - Medication compliance re-emphasized.\par -Not interested in seeing nutritionist \par \par - DIABETES EDUCATION:\par Extensive education about diabetes, hyperglycemia, hypoglycemia, glucose target ranges, dietary adherence to ADA diet, avoiding rice and sweets, eating fruits and fresh vegetables highly recommended. \par Advise to increase the physical activity . \par Reviewed the importance of following up with outpatient endocrinology/ opthalmology and podiatry appointments discussed. \par Explained the definition of HBA1C and target range. \par Explained the complications of diabetes including stroke, renal failure and blindness\par Reviewed hypoglycemic sign/symptoms and necessary precautions. A\par Patient verbalized understanding and agrees with the plan \par \par \par OBESITY\par \par BMI 35.95 with comorbidities \par  Check labs: TSH, CMP, A1C, fasting insulin/glucose, fasting lipids \par -Does not seem to have Cushing syndrome therefore will not do LDDST \par l - Start food log\par  - Discussed low glycemic diet\par  - choose whole grain carbohydrates,\par -  avoid sugar-sweetened beverages and include lean proteins, fruits, vegetables and healthy fats\par  - Discussed exercise\par  - start wall presses and walking, increase gradually as tolerated \par  - Refer for sleep study \par - Consider anti-obesity medication\par

## 2023-04-26 NOTE — REASON FOR VISIT
[Home] : at home, [unfilled] , at the time of the visit. [Medical Office: ([facility])___] : at the medical office located in  [Patient] : the patient [Self] : self [Initial Evaluation] : an initial evaluation [DM Type 2] : DM Type 2

## 2023-04-26 NOTE — REVIEW OF SYSTEMS
[Fatigue] : fatigue [Recent Weight Gain (___ Lbs)] : recent weight gain: [unfilled] lbs [Decreased Appetite] : appetite not decreased [Blurred Vision] : no blurred vision [Neck Pain] : no neck pain [Chest Pain] : no chest pain [Palpitations] : no palpitations [Lower Ext Edema] : no lower extremity edema [Shortness Of Breath] : no shortness of breath [Cough] : no cough [SOB on Exertion] : no shortness of breath on exertion [Nausea] : no nausea [Constipation] : no constipation [Abdominal Pain] : no abdominal pain [Vomiting] : no vomiting [Diarrhea] : no diarrhea [Polyuria] : no polyuria [Dysuria] : no dysuria [Joint Pain] : no joint pain [Muscle Weakness] : no muscle weakness [Myalgia] : no myalgia  [Joint Stiffness] : no joint stiffness [Dry Skin] : no dry skin [Hair Loss] : no hair loss [Headaches] : no headaches [Dizziness] : no dizziness [Tremors] : no tremors [Pain/Numbness of Digits] : no pain/numbness of digits [Insomnia] : no insomnia [Anxiety] : no anxiety [Cold Intolerance] : no cold intolerance [Polydipsia] : no polydipsia [Heat Intolerance] : no heat intolerance [Hot Flashes] : no hot flashes [Swelling] : no swelling

## 2023-04-26 NOTE — HISTORY OF PRESENT ILLNESS
[FreeTextEntry1] : 56-year-old female with past medical history of type 2 diabetes, thyroid cyst, hyperlipidemia, osteoporosis, BYERS, breast cancer status post bilateral mastectomy/chemotherapy/radiotherapy, cataracts, GERD, hyperparathyroidism s/p parathyroid adenoma is scheduled for televisit for type 2 diabetes evaluation \par \par Diabetes history:\par Diabetes diagnosed in 2021\par Medication regimen: Ozempic 1 mg weekly, patient thinks that 1 mg Ozempic weekly is a very high dose, and she is also thinking that she has started losing hair after starting Ozempic.\par Recent fingersticks: Reports blood sugar 75-80 , highest blood sugar 90\par Hypoglycemic events: Denies \par Opthalmology appointment: Saw ophthalmologist in March 2022 and denies retinopathy\par Peripheral Neuropathy: Yes\par CVD: Denies\par Infections: Denies\par \par  \par Patient also reports that she has history of thyroid nodule in the past and had an ultrasound.  She has not done thyroid ultrasound for many years\par  \par  \par  \par \par \par \par \par

## 2023-05-09 ENCOUNTER — APPOINTMENT (OUTPATIENT)
Dept: PULMONOLOGY | Facility: CLINIC | Age: 58
End: 2023-05-09
Payer: MEDICARE

## 2023-05-09 VITALS
BODY MASS INDEX: 36.94 KG/M2 | HEIGHT: 58 IN | WEIGHT: 176 LBS | HEART RATE: 83 BPM | SYSTOLIC BLOOD PRESSURE: 130 MMHG | TEMPERATURE: 96.7 F | DIASTOLIC BLOOD PRESSURE: 86 MMHG | OXYGEN SATURATION: 98 %

## 2023-05-09 DIAGNOSIS — R06.00 DYSPNEA, UNSPECIFIED: ICD-10-CM

## 2023-05-09 PROCEDURE — 99204 OFFICE O/P NEW MOD 45 MIN: CPT

## 2023-05-10 NOTE — END OF VISIT
[] : Resident [FreeTextEntry3] : Cough likely post viral, currently resolved, off inhalers\par High pretest probability of MAURA - will send for HST [Time Spent: ___ minutes] : I have spent [unfilled] minutes of time on the encounter.

## 2023-05-10 NOTE — HISTORY OF PRESENT ILLNESS
[Never] : never [Awakes Unrefreshed] : awakes unrefreshed [Awakes with Dry Mouth] : awakes with dry mouth [Daytime Somnolence] : daytime somnolence [Nonrestorative Sleep] : nonrestorative sleep [Snoring] : snoring [TextBox_4] : 57F, obese, Breast CA (2014 w/ b/l mastectomy s/p chemo-RT, on anastrazole, follows with Dr. Miles,) colon polyps, cholecystectomy, atrial arrhythmia s/p ablation 2014, parathyroidectomy secondary to hypercalcemia ?tumor , glaucoma. She presents today for initial visit following an ED visit for shortness of breath. She was in [facility] ER ~April 19 as her primary care doctor sent her to the ER for shortness of breath. Pt with fever, chills, cough, headache ~3 weeks prior to seeing her PCP, she was given steroids, antibiotics (probably amoxicillin) but she was not improving and so she went to see her primary care doctor who sent her to the ED. From the ED, she states that she was sent home to follow up with her cardiologist. She saw her cardiology DrRanjan Kowalski. At the time of her discharge from [facility] ED, she was sent home on inhalers which may be Symbicort and she took them for about 2 weeks with improvement in her symptoms and she stopped taking them as she no longer had respiratory symptoms. \par Pt also reports very poor quality sleep, snoring and excessive daytime sleepiness. She reports having MAURA dx on sleep study some years back but does not have records and was never offered a CPAP. ESS >5 [Difficulty Maintaining Sleep] : does not have difficulty maintaining sleep [Dysesthesias] : denies dysesthesias [Frequent Nocturnal Awakening] : denies frequent nocturnal awakening [Hypnopompic Hallucinations] : denies hypnopompic hallucinations [Paresthesias] : denies paresthesias [Recent  Weight Gain] : no recent weight gain [Tired while Driving] : not tired while driving [Unintentional Sleep while Active] : no unintentional sleep while active [Unintentional Sleep while Inactive] : no unintentional sleep while inactive [Unusual Movements] : no unusual movements [Vivid dreams] : no vivid dreams [DIS] : does not have difficulty initiating sleep [DMS] : does not have difficulty maintaining sleep [Unusual Sleep Behavior] : no unusual sleep behavior [Hypersomnolence] : no hypersomnolence [Cataplexy] : no cataplexy [Sleep Paralysis] : no sleep paralysis [Hypnagogic Hallucinations] : no hypnagogic hallucinations [Hypnopompic Hallucinations] : no hypnopompic hallucinations

## 2023-05-10 NOTE — REVIEW OF SYSTEMS
[Dry Mouth] : dry mouth [A.M. Dry Mouth] : a.m. dry mouth [SOB on Exertion] : sob on exertion [Seasonal Allergies] : seasonal allergies [Fever] : no fever [Fatigue] : no fatigue [Recent Wt Gain (___ Lbs)] : ~T no recent weight gain [EDS] : no EDS [Chills] : no chills [Recent Wt Loss (___ Lbs)] : ~T no recent weight loss [Dry Eyes] : no dry eyes [Ear Disturbance] : no ear disturbance [Epistaxis] : no epistaxis [Sore Throat] : no sore throat [Eye Irritation] : no eye irritation [Nasal Congestion] : no nasal congestion [Postnasal Drip] : no postnasal drip [Sinus Problems] : no sinus problems [Mouth Ulcers] : no mouth ulcers [Poor Dentition] : no poor dentition [Edentulous] : no edentulous [Cough] : no cough [Hemoptysis] : no hemoptysis [Chest Tightness] : no chest tightness [Frequent URIs] : no frequent URIs [Sputum] : no sputum [Dyspnea] : no dyspnea [Pleuritic Pain] : no pleuritic pain [Wheezing] : no wheezing [Chest Discomfort] : no chest discomfort [Claudication] : no claudication [Edema] : no edema [Leg Cramps] : no leg cramps [Orthopnea] : no orthopnea [Palpitations] : no palpitations [Phlebitis] : no phlebitis [PND] : no PND [Syncope] : no syncope [Hay Fever] : no hay fever [Watery Eyes] : no watery eyes [Itchy Eyes] : no itchy eyes [Nasal Discharge] : no nasal discharge [Hives] : no hives [Angioedema] : no angioedema [Immunocompromised] : not immunocompromised [GERD] : no gerd [Abdominal Pain] : no abdominal pain [Nausea] : no nausea [Vomiting] : no vomiting [Diarrhea] : no diarrhea [Constipation] : no constipation [Dysphagia] : no dysphagia [Bleeding] : no bleeding [Food Intolerance] : no food intolerance [Hepatic Disease] : no hepatic disease [Nocturia] : no nocturia [Dysuria] : no dysuria [Frequency] : no frequency [Urgency] : no urgency [Irregular Menses] : no irregular menses [Miscarriage] : no miscarriage [Back Pain] : no back pain

## 2023-05-10 NOTE — PHYSICAL EXAM
[No Acute Distress] : no acute distress [IV] : Mallampati Class: IV [Normal Appearance] : normal appearance [Supple] : supple [No JVD] : no jvd [Normal Rate/Rhythm] : normal rate/rhythm [Normal S1, S2] : normal s1, s2 [No Murmurs] : no murmurs [No Resp Distress] : no resp distress [No Acc Muscle Use] : no acc muscle use [Clear to Auscultation Bilaterally] : clear to auscultation bilaterally [No Abnormalities] : no abnormalities [Benign] : benign [Normal Gait] : normal gait [No Edema] : no edema [Normal Color/ Pigmentation] : normal color/ pigmentation [No Focal Deficits] : no focal deficits [Oriented x3] : oriented x3 [Normal Affect] : normal affect [TextBox_80] : (+) b/l mastectomy

## 2023-05-10 NOTE — ASSESSMENT
[FreeTextEntry1] : 57F, obese, Breast CA (2014 w/ b/l mastectomy s/p chemo-RT, on anastrazole, follows with Dr. Miles,) colon polyps, cholecystectomy, atrial arrhythmia s/p ablation 2014, GERD, parathyroidectomy secondary to hypercalcemia ?tumor , glaucoma. She presents today for initial visit following an ED visit for shortness of breath. \par \par #Dyspnea\par - suspect viral or bacterial URI\par - resolving\par \par #Suspected Sleep Apnea\par - pt reports having done a sleep study about 2 years ago, asked to bring records\par high pretest probability of MAURA\par - recommend home sleep study\par \par f/u in 3 months

## 2023-05-19 ENCOUNTER — APPOINTMENT (OUTPATIENT)
Dept: ENDOCRINOLOGY | Facility: CLINIC | Age: 58
End: 2023-05-19
Payer: MEDICARE

## 2023-05-19 DIAGNOSIS — R73.03 PREDIABETES.: ICD-10-CM

## 2023-05-19 DIAGNOSIS — Z86.39 PERSONAL HISTORY OF OTHER ENDOCRINE, NUTRITIONAL AND METABOLIC DISEASE: ICD-10-CM

## 2023-05-19 DIAGNOSIS — E78.00 PURE HYPERCHOLESTEROLEMIA, UNSPECIFIED: ICD-10-CM

## 2023-05-19 LAB
ALBUMIN SERPL ELPH-MCNC: 4.4 G/DL
ALP BLD-CCNC: 101 U/L
ALT SERPL-CCNC: 39 U/L
ANION GAP SERPL CALC-SCNC: 13 MMOL/L
AST SERPL-CCNC: 35 U/L
BILIRUB SERPL-MCNC: 0.3 MG/DL
BUN SERPL-MCNC: 15 MG/DL
CALCIUM SERPL-MCNC: 9.9 MG/DL
CHLORIDE SERPL-SCNC: 103 MMOL/L
CHOLEST SERPL-MCNC: 217 MG/DL
CO2 SERPL-SCNC: 25 MMOL/L
CREAT SERPL-MCNC: 0.71 MG/DL
EGFR: 98 ML/MIN/1.73M2
ESTIMATED AVERAGE GLUCOSE: 114 MG/DL
GLUCOSE SERPL-MCNC: 87 MG/DL
HBA1C MFR BLD HPLC: 5.6 %
HDLC SERPL-MCNC: 68 MG/DL
LDLC SERPL CALC-MCNC: 129 MG/DL
NONHDLC SERPL-MCNC: 149 MG/DL
POTASSIUM SERPL-SCNC: 4.1 MMOL/L
PROT SERPL-MCNC: 7.3 G/DL
SODIUM SERPL-SCNC: 141 MMOL/L
TRIGL SERPL-MCNC: 97 MG/DL
TSH SERPL-ACNC: 2.05 UIU/ML

## 2023-05-19 PROCEDURE — 99441: CPT | Mod: 95

## 2023-06-20 ENCOUNTER — APPOINTMENT (OUTPATIENT)
Dept: ENDOCRINOLOGY | Facility: CLINIC | Age: 58
End: 2023-06-20

## 2023-07-12 ENCOUNTER — APPOINTMENT (OUTPATIENT)
Dept: HEPATOLOGY | Facility: CLINIC | Age: 58
End: 2023-07-12
Payer: MEDICARE

## 2023-07-12 VITALS
RESPIRATION RATE: 16 BRPM | HEIGHT: 58 IN | HEART RATE: 74 BPM | SYSTOLIC BLOOD PRESSURE: 123 MMHG | OXYGEN SATURATION: 98 % | BODY MASS INDEX: 37.79 KG/M2 | WEIGHT: 180 LBS | TEMPERATURE: 97.6 F | DIASTOLIC BLOOD PRESSURE: 77 MMHG

## 2023-07-12 PROCEDURE — 99214 OFFICE O/P EST MOD 30 MIN: CPT

## 2023-07-13 LAB
25(OH)D3 SERPL-MCNC: 22.6 NG/ML
AFP-TM SERPL-MCNC: 8.3 NG/ML
ALBUMIN SERPL ELPH-MCNC: 4.4 G/DL
ALP BLD-CCNC: 109 U/L
ALT SERPL-CCNC: 44 U/L
ANION GAP SERPL CALC-SCNC: 15 MMOL/L
AST SERPL-CCNC: 36 U/L
BILIRUB DIRECT SERPL-MCNC: 0.1 MG/DL
BILIRUB INDIRECT SERPL-MCNC: 0.2 MG/DL
BILIRUB SERPL-MCNC: 0.3 MG/DL
BUN SERPL-MCNC: 14 MG/DL
CALCIUM SERPL-MCNC: 9.4 MG/DL
CHLORIDE SERPL-SCNC: 105 MMOL/L
CO2 SERPL-SCNC: 22 MMOL/L
CREAT SERPL-MCNC: 0.65 MG/DL
EGFR: 102 ML/MIN/1.73M2
GLUCOSE SERPL-MCNC: 81 MG/DL
INR PPP: 1.06 RATIO
POTASSIUM SERPL-SCNC: 3.7 MMOL/L
PROT SERPL-MCNC: 7.3 G/DL
PT BLD: 12.4 SEC
SODIUM SERPL-SCNC: 142 MMOL/L

## 2023-07-14 ENCOUNTER — APPOINTMENT (OUTPATIENT)
Dept: ENDOCRINOLOGY | Facility: CLINIC | Age: 58
End: 2023-07-14
Payer: MEDICARE

## 2023-07-14 PROCEDURE — 99441: CPT | Mod: 95

## 2023-07-17 NOTE — ASSESSMENT
[FreeTextEntry1] : 58 yo Female with Hx of breast cancer (Dx in 2014, s/p b/l mastectomy, s/p chemo-RT, was on anastrazole, switched to exemestane as per her oncologist, Dr. Miles in 2021), obesity (BMI 35->39), colon polyps, Hx of cholecystectomy (gallstones), Hx of parathyroidectomy (no records, per patient was told to have "benign tumor"), glaucoma, recent conjunctivitis, was sent for liver biopsy by gastroenterologist, Dr. Alejandre ([phone]) because of abnormal liver tests. Biopsy was done 9/24/2020 at [facility]. The biopsy reports mild steatohepatitis, also moderate chronic inflammation and interface hepatitis (grade 2-3 of 4) and septal fibrous expansion and focal bridging fibrosis (stage 3-4). Reviewed with Liver pathologist and the consensus was : moderate steatohepatitis, mild-to moderate chronic hepatitis with differential of AIH or AI pattern induced by drug, and while no features, but PBC or PSC could not be ruled out either. There was bile duct paucity. There was F3 fibrosis. FibroScan also showed S3 steatosis and F3 fibrosis.\par \par \par Moderate steatohepatitis \par - Discussed natural Hx of fatty liver and life style modifications in details\par - FibroScan S3, F3\par - She was started on Ozempic in 10/2021. Denied Hx of medullary thyroid carcinoma or FHx of medullary thyroid carcinoma. Denied Hx of MEN2, no thyroid ca, no pheochromocytoma. \par --- C/w diet, exercise.\par --- C/w low dose statin.\par --- C/w Ozempic per endocrinology. Patient started to see Dr. Yee. Will reach out whether option for uptitrating Ozempic dose or switching to Mounjaro as she is still gaining weight. \par \par \par Mild to moderate chronic hepatitis, suggestive of AIH based on biopsy or AI pattern drug induced\par - Meds reviewed,  neither of them is linked to AI type liver injury\par - ZAIRA was neg (except once 1:80), ASMA weak pos 1:20, with repeat neg, SLA and LKM neg, IgG was elevated but < 1.1x ULN and now WNL\par - Liver enzymes were minimally elevated, and most recently normalized\par - Based on above did not meet criteria of treatment for AIH, been monitored (Quantiferon neg, TPMT normal), especially that steroid could worsen her weight, DM etc.\par - Per biopsy report, paucity of bile ducts, could not r/o PBC,PSC, but no typical histological findings, AMA neg, IgM nl, ANCA neg; PBC specific ZAIRA was ordered, but never resulted. Since  ALP remained elevated, started on Ursodiol in 2/2021. \par - Rest of liver workup unrevealing\par - US  11/2020 with hepatic steatosis, s/p cholecystectomy; MRCP 5/4/21 fatty liver, otherwise unremarkable\par - Discussed with Dr. Miles: patient was on letrozole, but not since 8/2020, now being on exemestane\par --- C/w Ursodiol (liver enzymes now normalized). Reports that has difficulty with pill size, denies dysphagia, but requested to change to capsule. \par --- Rheum referral placed previously b/o pos ZAIRA, not seen yet, advised to schedule.\par \par Advanced fibrosis (F3, plus stigmata of advanced CLD, and low PLT) w/o signs of decompensation\par - No ascites\par - No PSE\par -No EVs. Was referred to GI when PLT was < 150. ECO 10/2022, gastritis and internal hemorrhoids. Now PLT > 150 and LSM < 20 kPa.\par - Last MRI abd/MRCP 8/2022 w/o focal hepatic lesion. \par ---C/w q 6 months HCC screening, overdue, advised to schedule US abd ASAP.  Will get AFP\par \par \par Osteopenia\par - C/w on vit D calcium\par \par Bone pain, hx of breast ca (although was local), ALP elevation, with nl GGT and now nl transaminases\par - Bone scan neg for metastatic disease\par \par Maculopapular skin lesions\par - F/u with dermatology\par \par Bloating, GERD\par  -  f/u w/ GI\par \par RTC 3 months, but patient to call office, reports had PET CT, still has not received it, task sent to request result. \par \par \par

## 2023-07-17 NOTE — HISTORY OF PRESENT ILLNESS
[Needlestick Exposure] : no needlestick exposure [Infected Sexual Partner] : no infected sexual partner [IV Drug Use] : no IV drug use [Tattoo] : no tattoos [Body Piercing] : no body piercing [Hemodialysis] : no hemodialysis [Transfusion before 1992] : no transfusion before 1992 [Transplant before 1992] : no transplant before 1992 [Incarceration] : no incarceration [Alcohol Abuse] : no alcohol abuse [Autoimmune Disorder] : no autoimmune disorder [Household Contact to HBV] : no household contact to HBV [Travel to Endemic Area] : no travel to an endemic area [Occupational Exposure] : no occupational exposure [Cocaine Use] : no cocaine use [de-identified] : Patient stated that do not need , preferred discussion in English.\par \par 58 yo obese (BMI 35->39->37)  Female with Hx of breast cancer (Dx in 2014, s/p b/l mastectomy, s/p chemo-RT, was on anastrazole, as per patient was changed by his oncologist, Dr. Miles), colon polyps, Hx of cholecystectomy (gallstones), Hx of parathyroidectomy (no records, per patient was told to have "benign tumor"), glaucoma, recent conjunctivitis, s/p liver biopsy by gastroenterologist, Dr. Alejandre ([phone]) because of abnormal liver tests. No liver test records available from that time. Biopsy was done 9/24/2020 at [facility]. The biopsy reports mild steatohepatitis, also moderate chronic inflammation and interface hepatitis (grade 2-3 of 4) and septal fibrous expansion and focal bridging fibrosis (stage 3-4). \par Patient was not started on any medication for liver disease. Patient was sent for second opinion from Dr Miles`s office.  \par \par Biopsy samples were obtained and reviewed by Dr. Reji De La Cruz liver pathologist at [facility]: Moderate steatohepatitis and Mild-to moderate chronic hepatitis with differential of AIH or AI pattern induced by drug, and while no features, but PBC or PSC could not be ruled out either. There was bile duct paucity. There was F3 fibrosis. FibroScan also showed S3 steatosis and F3 fibrosis.\par \par Patient`s liver enzymes were WNL except ALP. AI markers showed neg ZAIRA, minimally elevated IgG (< 1.1x or WNL) , weak pos ASMA (1:20), neg anti LKM and neg anti SLA. AMA was neg and IgM was nl. \par Given the normal AI serology and normal transaminases, she was not started on any immunosuppressant. Given the mild elevation of ALP, and some bile duct paucity, she was started on Ursodiol, although there were no distinct features of PBC on liver biopsy, but as per report "could not be excluded".  \par MRI abd w/wo contrast and MRCP in 5/2021 showed unremarkable bile ducts and fatty liver. \par She had bone scan, b/o Hx of breast cancer and solely ALP elevation, and it was negative for metastatic disease. \par ALP has not changed significantly on Ursodiol.\par \par She denied any prior Hx of liver disease, FHx of liver disease, has obesity, being born in [place] but no other risk factors. Denied OTC or herbal meds/supplements. Denied alcohol. \par She was noted to have few spider naevi, palmar erythema. \par \par S/p Moderna 1st 3/1/21 2nd 3/29/21, had high fever for 3 days. \par She has been following dermatology or maculopapular lesion, small, red, on extremities. Reportedly, she was told to have psoriasis, only on topical treatment.\par \par She had colonoscopy on 8/4/21:  6 mm transverse colon sessile polyp, 7 mm cecum polyp, 3 mm polyp rectum. No biopsy result on file, repeat was recommended in 3 years.\par \par In 9/2021 she was noted significant weight gain and developed diabetes. Previously she was prediabetic. She reported that her PCP applied for Ozempic in past but was not approved (was prediabetic that time). \par \par Given her obesity and DM, applied for Ozempic and was approved. She was started on it 11/2021 and reported that her appetite decreased since then, and has not experience other side effects. She changed her insurance from 12/1/21 and b/o that lost follow up. She has not seen any hepatology interim, RTC in 3/2021 and then had lapse in insurance again. \par She had COVID-19 in 1/2022.\par \par She had lapse in insurance after 3/2022 visit again (see task list). Reported that she was given Ozempic samples by endocrinology. Reported that she reduced Ozempic dose  back to 0.25 because was not sure that enough till her endocrinologist returns. Reports fasting blood sugar around 90, and does not measure post meal. She lost 5 lb compared to Nov 2021, but none since the beginning of the year.\par \par 1/2023 visit No new complaints. Awaiting disability. \par \par 4/12/23 follow up. She was hospitalized at [facility] b/o fever, chest tightness. No records. Methylprednisolone 4 mg, QVar inhaler,  Albuterol inhaler. Reports that was seeing Austyn Castillo outpatient was treated with antibiotics, but did not improve and was sent to hospital reportedly b/o hypoxia in 80s (around March 21 st). \par He completed methylprednisolone. \par PET scan was done 4/11/23, no report yet, was ordered by Dr. Miles. \par \par She is here for follow up. No new complaints. Remains on Ozempic 1 mg, but gained weight.  [de-identified] : Born [facility] (moved in [place], last traveled >6 years ago)

## 2023-07-17 NOTE — REVIEW OF SYSTEMS
[Heartburn] : heartburn [Skin Lesions] : skin lesion [Negative] : Heme/Lymph [As Noted in HPI] : as noted in HPI [Fever] : no fever [Chills] : no chills [Feeling Poorly] : not feeling poorly [Feeling Tired] : not feeling tired [Abdominal Pain] : no abdominal pain [Vomiting] : no vomiting [Constipation] : no constipation [Diarrhea] : no diarrhea [Melena] : no melena [Dysuria] : no dysuria [Incontinence] : no incontinence [FreeTextEntry7] : Hx of reflux, controlled with diet and on famotidine [FreeTextEntry8] : urinary frequency for 3 years, being followed by Urology [FreeTextEntry9] : Knee pain and reports "bone pain" in lower extremities [de-identified] : small, circular, red, maculopapular lesions on extremities - following with dermatology [de-identified] : Hx of depression

## 2023-07-19 ENCOUNTER — NON-APPOINTMENT (OUTPATIENT)
Age: 58
End: 2023-07-19

## 2023-10-18 ENCOUNTER — APPOINTMENT (OUTPATIENT)
Dept: GASTROENTEROLOGY | Facility: CLINIC | Age: 58
End: 2023-10-18
Payer: MEDICARE

## 2023-10-18 ENCOUNTER — APPOINTMENT (OUTPATIENT)
Dept: HEPATOLOGY | Facility: CLINIC | Age: 58
End: 2023-10-18
Payer: MEDICARE

## 2023-10-18 VITALS
OXYGEN SATURATION: 98 % | HEIGHT: 58 IN | BODY MASS INDEX: 37.16 KG/M2 | SYSTOLIC BLOOD PRESSURE: 122 MMHG | RESPIRATION RATE: 16 BRPM | TEMPERATURE: 97.8 F | HEART RATE: 78 BPM | DIASTOLIC BLOOD PRESSURE: 79 MMHG | WEIGHT: 177 LBS

## 2023-10-18 VITALS
HEART RATE: 69 BPM | BODY MASS INDEX: 37.16 KG/M2 | SYSTOLIC BLOOD PRESSURE: 113 MMHG | DIASTOLIC BLOOD PRESSURE: 72 MMHG | OXYGEN SATURATION: 98 % | TEMPERATURE: 98 F | HEIGHT: 58 IN | WEIGHT: 177 LBS

## 2023-10-18 DIAGNOSIS — K75.81 NONALCOHOLIC STEATOHEPATITIS (NASH): ICD-10-CM

## 2023-10-18 DIAGNOSIS — J30.2 OTHER SEASONAL ALLERGIC RHINITIS: ICD-10-CM

## 2023-10-18 PROCEDURE — 99214 OFFICE O/P EST MOD 30 MIN: CPT

## 2023-10-18 RX ORDER — MULTIVITAMIN/IRON/FOLIC ACID 18MG-0.4MG
600-400 TABLET ORAL DAILY
Qty: 30 | Refills: 1 | Status: COMPLETED | COMMUNITY
Start: 2021-01-15 | End: 2023-10-18

## 2023-10-18 RX ORDER — URSODIOL 300 MG/1
300 CAPSULE ORAL
Qty: 30 | Refills: 2 | Status: DISCONTINUED | COMMUNITY
Start: 2023-07-12 | End: 2023-10-18

## 2023-10-31 PROBLEM — K75.81 STEATOHEPATITIS, NON-ALCOHOLIC: Status: ACTIVE | Noted: 2021-01-13

## 2023-11-06 LAB
25(OH)D3 SERPL-MCNC: 17.4 NG/ML
AFP-TM SERPL-MCNC: 7.8 NG/ML
ALBUMIN SERPL ELPH-MCNC: 4.2 G/DL
ALP BLD-CCNC: 110 U/L
ALT SERPL-CCNC: 40 U/L
ANION GAP SERPL CALC-SCNC: 11 MMOL/L
AST SERPL W P-5'-P-CCNC: 37 IU/L
AST SERPL-CCNC: 33 U/L
BILIRUB DIRECT SERPL-MCNC: 0.1 MG/DL
BILIRUB INDIRECT SERPL-MCNC: 0.2 MG/DL
BILIRUB SERPL-MCNC: 0.3 MG/DL
BUN SERPL-MCNC: 19 MG/DL
CALCIUM SERPL-MCNC: 9.2 MG/DL
CHLORIDE SERPL-SCNC: 105 MMOL/L
CHOLEST SERPL-MCNC: 186 MG/DL
CHOLEST SERPL-MCNC: 193 MG/DL
CO2 SERPL-SCNC: 26 MMOL/L
COMMENT:: NORMAL
CREAT SERPL-MCNC: 0.7 MG/DL
EGFR: 100 ML/MIN/1.73M2
ESTIMATED AVERAGE GLUCOSE: 120 MG/DL
FIBROSIS STAGE SERPL QL: NORMAL
FIBROSURE ALPHA 2 MACROGLOBULINS: 333 MG/DL
FIBROSURE ALT (SGPT): 42 IU/L
FIBROSURE APOLIPOPROTEIN A1: 164 MG/DL
FIBROSURE GGT: 32 IU/L
FIBROSURE HAPTOGLOBIN: 131 MG/DL
FIBROSURE SCORING: NORMAL
FIBROSURE TOTAL BILIRUBIN: 0.1 MG/DL
GLUCOSE SERPL-MCNC: 93 MG/DL
GLUCOSE SERPL-MCNC: 94 MG/DL
HBA1C MFR BLD HPLC: 5.8 %
HCT VFR BLD CALC: 41.8 %
HDLC SERPL-MCNC: 60 MG/DL
HGB BLD-MCNC: 13.6 G/DL
INR PPP: 1.04 RATIO
INTERPRETATIONS:: NORMAL
LDLC SERPL CALC-MCNC: 114 MG/DL
LIVER FIBR SCORE SERPL CALC.FIBROSURE: 0.15
Lab: NORMAL
MCHC RBC-ENTMCNC: 29.1 PG
MCHC RBC-ENTMCNC: 32.5 GM/DL
MCV RBC AUTO: 89.5 FL
NASH SCORING: NORMAL
NECROINFLAMMATORY ACT GRADE SERPL QL: NORMAL
NECROINFLAMMATORY ACT SCORE SERPL: 0
NONHDLC SERPL-MCNC: 133 MG/DL
PLATELET # BLD AUTO: 150 K/UL
POTASSIUM SERPL-SCNC: 4 MMOL/L
PROT SERPL-MCNC: 7.2 G/DL
PT BLD: 11.7 SEC
RBC # BLD: 4.67 M/UL
RBC # FLD: 14.7 %
SERVICE CMNT-IMP: NORMAL
SODIUM SERPL-SCNC: 142 MMOL/L
STEATOSIS GRADE: NORMAL
STEATOSIS SCORE: 0.37
STEATOSIS SCORING: NORMAL
TRIGL SERPL-MCNC: 112 MG/DL
TRIGL SERPL-MCNC: 112 MG/DL
WBC # FLD AUTO: 5.98 K/UL

## 2023-12-20 ENCOUNTER — APPOINTMENT (OUTPATIENT)
Dept: HEPATOLOGY | Facility: CLINIC | Age: 58
End: 2023-12-20
Payer: MEDICARE

## 2023-12-20 VITALS
DIASTOLIC BLOOD PRESSURE: 72 MMHG | SYSTOLIC BLOOD PRESSURE: 112 MMHG | OXYGEN SATURATION: 98 % | WEIGHT: 181 LBS | TEMPERATURE: 98.1 F | RESPIRATION RATE: 16 BRPM | HEART RATE: 67 BPM | BODY MASS INDEX: 37.99 KG/M2 | HEIGHT: 58 IN

## 2023-12-20 DIAGNOSIS — K74.3 PRIMARY BILIARY CIRRHOSIS: ICD-10-CM

## 2023-12-20 DIAGNOSIS — R76.8 OTHER SPECIFIED ABNORMAL IMMUNOLOGICAL FINDINGS IN SERUM: ICD-10-CM

## 2023-12-20 PROCEDURE — 99214 OFFICE O/P EST MOD 30 MIN: CPT

## 2023-12-20 RX ORDER — CHOLECALCIFEROL (VITAMIN D3) 25 MCG
25 MCG TABLET ORAL DAILY
Qty: 30 | Refills: 2 | Status: DISCONTINUED | COMMUNITY
Start: 2023-07-12 | End: 2023-12-20

## 2023-12-29 PROBLEM — K74.3 PRIMARY BILIARY CHOLANGITIS: Status: ACTIVE | Noted: 2022-06-22

## 2023-12-29 PROBLEM — R76.8 POSITIVE ANA (ANTINUCLEAR ANTIBODY): Status: ACTIVE | Noted: 2022-06-24

## 2023-12-29 NOTE — ASSESSMENT
[FreeTextEntry1] : 57 yo Female with Hx of breast cancer (Dx in 2014, s/p b/l mastectomy, s/p chemo-RT, was on anastrazole, switched to exemestane as per her oncologist, Dr. Miles in 2021), obesity (BMI 35->39), colon polyps, Hx of cholecystectomy (gallstones), Hx of parathyroidectomy (no records, per patient was told to have "benign tumor"), glaucoma, recent conjunctivitis, was sent for liver biopsy by gastroenterologist, Dr. Alejandre ([phone]) because of abnormal liver tests. Biopsy was done 9/24/2020 at [facility]. The biopsy reports mild steatohepatitis, also moderate chronic inflammation and interface hepatitis (grade 2-3 of 4) and septal fibrous expansion and focal bridging fibrosis (stage 3-4). Reviewed with Liver pathologist and the consensus was : moderate steatohepatitis, mild-to moderate chronic hepatitis with differential of AIH or AI pattern induced by drug, and while no features, but PBC or PSC could not be ruled out either. There was bile duct paucity. There was F3 fibrosis. FibroScan also showed S3 steatosis and F3 fibrosis.   Moderate steatohepatitis - Discussed natural Hx of fatty liver and life style modifications in details - FibroScan S3, F3 - She was started on Ozempic in 10/2021. Denied Hx of medullary thyroid carcinoma or FHx of medullary thyroid carcinoma. Denied Hx of MEN2, no thyroid ca, no pheochromocytoma. --- C/w diet, exercise. --- C/w low dose statin. --- Now off Ozempic. continue to holding it. Follow up with endocrinology.   Mild to moderate chronic hepatitis, suggestive of AIH based on biopsy or AI pattern drug induced - Meds reviewed, neither of them is linked to AI type liver injury - ZAIRA was neg (except once 1:80), ASMA weak pos 1:20, with repeat neg, SLA and LKM neg, IgG was elevated but < 1.1x ULN and now WNL - Liver enzymes were minimally elevated, and most recently normalized - Based on above did not meet criteria of treatment for AIH, been monitored (Quantiferon neg, TPMT normal), especially that steroid could worsen her weight, DM etc. - Per biopsy report, paucity of bile ducts, could not r/o PBC,PSC, but no typical histological findings, AMA neg, IgM nl, ANCA neg; PBC specific ZAIRA was ordered, but never resulted. Since ALP remained elevated, started on Ursodiol in 2/2021. - Rest of liver workup unrevealing - US 11/2020 with hepatic steatosis, s/p cholecystectomy; MRCP 5/4/21 fatty liver, otherwise unremarkable - Discussed with Dr. Miles: patient was on letrozole, but not since 8/2020, now being on exemestane --- C/w Ursodiol (liver enzymes now normalized). Reports that has difficulty with pill size, denies dysphagia, but requested to change to capsule. --- Rheum referral placed previously b/o pos ZAIRA, not seen yet, advised to schedule.  Advanced fibrosis (F3, plus stigmata of advanced CLD, and low PLT) w/o signs of decompensation - No ascites - No PSE -No EVs. Was referred to GI when PLT was < 150. ECO 10/2022, gastritis and internal hemorrhoids. Now PLT > 150 and LSM < 20 kPa. - Last MRI abd/MRCP 8/2022 w/o focal hepatic lesion. ---C/w q 6 months HCC screening, US abd w/o focal hepatic lesion 8/2023.  RTC 3 months

## 2023-12-29 NOTE — REVIEW OF SYSTEMS
[As Noted in HPI] : as noted in HPI [Eyesight Problems] : eyesight problems [Heartburn] : heartburn [Skin Lesions] : skin lesion [Negative] : Heme/Lymph [Fever] : no fever [Chills] : no chills [Feeling Poorly] : not feeling poorly [Feeling Tired] : not feeling tired [Chest Pain] : no chest pain [Lower Ext Edema] : no lower extremity edema [Shortness Of Breath] : no shortness of breath [Cough] : no cough [Abdominal Pain] : no abdominal pain [Vomiting] : no vomiting [Constipation] : no constipation [Diarrhea] : no diarrhea [Melena] : no melena [Dysuria] : no dysuria [Incontinence] : no incontinence [Joint Swelling] : no joint swelling [Joint Stiffness] : no joint stiffness [FreeTextEntry7] : Hx of reflux, controlled with diet and on famotidine [FreeTextEntry8] : urinary frequency for 3 years, being followed by Urology [FreeTextEntry9] : Knee pain and reports "bone pain" in lower extremities [de-identified] : small, circular, red, maculopapular lesions on extremities - following with dermatology [de-identified] : Hx of depression

## 2023-12-29 NOTE — HISTORY OF PRESENT ILLNESS
[Malaise] : denies malaise [Fatigue] : denies fatigue [Diffuse Joint Pain (Arthralgias)] : arthralgias stable [Yellow Skin Or Eyes (Jaundice)] : denies jaundice [Abdominal Pain] : denies abdominal pain [Urine Tests Nonspecific Abnormal Findings Biliuria] : denies dark urine [Light Colored Bowel Movement (Acholic Stools)] : denies pale stools [Insomnia] : denies insomnia [Skin: Rash] : denies rash [Needlestick Exposure] : no needlestick exposure [Infected Sexual Partner] : no infected sexual partner [IV Drug Use] : no IV drug use [Tattoo] : no tattoos [Body Piercing] : no body piercing [Hemodialysis] : no hemodialysis [Transfusion before 1992] : no transfusion before 1992 [Transplant before 1992] : no transplant before 1992 [Incarceration] : no incarceration [Alcohol Abuse] : no alcohol abuse [Autoimmune Disorder] : no autoimmune disorder [Household Contact to HBV] : no household contact to HBV [Travel to Endemic Area] : no travel to an endemic area [Occupational Exposure] : no occupational exposure [Cocaine Use] : no cocaine use [de-identified] : Patient stated that do not need , preferred discussion in English.  57 yo obese (BMI 35->39->37)  Female with Hx of breast cancer (Dx in 2014, s/p b/l mastectomy, s/p chemo-RT, was on anastrazole, as per patient was changed by his oncologist, Dr. Miles), colon polyps, Hx of cholecystectomy (gallstones), Hx of parathyroidectomy (no records, per patient was told to have "benign tumor"), glaucoma, recent conjunctivitis, s/p liver biopsy by gastroenterologist, Dr. Alejandre ([phone]) because of abnormal liver tests. No liver test records available from that time. Biopsy was done 9/24/2020 at [facility]. The biopsy reports mild steatohepatitis, also moderate chronic inflammation and interface hepatitis (grade 2-3 of 4) and septal fibrous expansion and focal bridging fibrosis (stage 3-4).  Patient was not started on any medication for liver disease. Patient was sent for second opinion from Dr Miles`s office.    Biopsy samples were obtained and reviewed by Dr. Reji De La Cruz liver pathologist at [facility]: Moderate steatohepatitis and Mild-to moderate chronic hepatitis with differential of AIH or AI pattern induced by drug, and while no features, but PBC or PSC could not be ruled out either. There was bile duct paucity. There was F3 fibrosis. FibroScan also showed S3 steatosis and F3 fibrosis.  Patient`s liver enzymes were WNL except ALP. AI markers showed neg ZAIRA, minimally elevated IgG (< 1.1x or WNL) , weak pos ASMA (1:20), neg anti LKM and neg anti SLA. AMA was neg and IgM was nl.  Given the normal AI serology and normal transaminases, she was not started on any immunosuppressant. Given the mild elevation of ALP, and some bile duct paucity, she was started on Ursodiol, although there were no distinct features of PBC on liver biopsy, but as per report "could not be excluded".   MRI abd w/wo contrast and MRCP in 5/2021 showed unremarkable bile ducts and fatty liver.  She had bone scan, b/o Hx of breast cancer and solely ALP elevation, and it was negative for metastatic disease.  ALP has not changed significantly on Ursodiol.  She denied any prior Hx of liver disease, FHx of liver disease, has obesity, being born in [facility] but no other risk factors. Denied OTC or herbal meds/supplements. Denied alcohol.  She was noted to have few spider naevi, palmar erythema.   S/p Moderna 1st 3/1/21 2nd 3/29/21, had high fever for 3 days.  She has been following dermatology or maculopapular lesion, small, red, on extremities. Reportedly, she was told to have psoriasis, only on topical treatment.  She had colonoscopy on 8/4/21:  6 mm transverse colon sessile polyp, 7 mm cecum polyp, 3 mm polyp rectum. No biopsy result on file, repeat was recommended in 3 years.  In 9/2021 she was noted significant weight gain and developed diabetes. Previously she was prediabetic. She reported that her PCP applied for Ozempic in past but was not approved (was prediabetic that time).   Given her obesity and DM, applied for Ozempic and was approved. She was started on it 11/2021 and reported that her appetite decreased since then, and has not experience other side effects. She changed her insurance from 12/1/21 and b/o that lost follow up. She has not seen any hepatology interim, RTC in 3/2021 and then had lapse in insurance again.  She had COVID-19 in 1/2022.  She had lapse in insurance after 3/2022 visit again (see task list). Reported that she was given Ozempic samples by endocrinology. Reported that she reduced Ozempic dose  back to 0.25 because was not sure that enough till her endocrinologist returns. Reports fasting blood sugar around 90, and does not measure post meal. She lost 5 lb compared to Nov 2021, but none since the beginning of the year.  1/2023 visit No new complaints. Awaiting disability.   4/12/23 follow up. She was hospitalized at [facility] b/o fever, chest tightness. No records. Methylprednisolone 4 mg, QVar inhaler,  Albuterol inhaler. Reports that was seeing Austyn Castillo outpatient was treated with antibiotics, but did not improve and was sent to hospital reportedly b/o hypoxia in 80s (around March 21 st).  He completed methylprednisolone.  PET scan was done 4/11/23, no report yet, was ordered by Dr. Miles.   7/2023 follow up. No new complaints. Remains on Ozempic 1 mg, but gained weight.   10/18/23 Patient denies any hospitalizations since the last visit. Patient denies abdominal pain, nausea or vomiting. Patient reports that she has diarrhea with the intake of the food. Denies melena or hematochezia. Patient reports appointment with Dr. Moya today. Patient reports taking Ozempic 2mg dose since about 2 months ago. Patient reports a little weight loss.   Patient is here for follow up because she reported that had vertigo when restarted Ozempic after 1 week break and it resolved after she stopped Ozempic. She also measured FSBS in 70s when on Ozempic. Mauricioenlty not on antidiabetic. Introduced her to Dr. Yee who will see patient 1/2024 for DM  and weight loss mgmt, hormonal w/u.  [de-identified] : Born [facility] (moved in [place], last traveled >6 years ago)

## 2024-01-03 ENCOUNTER — APPOINTMENT (OUTPATIENT)
Dept: ENDOCRINOLOGY | Facility: CLINIC | Age: 59
End: 2024-01-03
Payer: MEDICARE

## 2024-01-03 VITALS
SYSTOLIC BLOOD PRESSURE: 118 MMHG | HEIGHT: 58 IN | DIASTOLIC BLOOD PRESSURE: 79 MMHG | WEIGHT: 185 LBS | TEMPERATURE: 98 F | HEART RATE: 77 BPM | RESPIRATION RATE: 16 BRPM | BODY MASS INDEX: 38.83 KG/M2 | OXYGEN SATURATION: 97 %

## 2024-01-03 LAB
ALBUMIN SERPL ELPH-MCNC: 4.1 G/DL
ALP BLD-CCNC: 109 U/L
ALT SERPL-CCNC: 49 U/L
ANION GAP SERPL CALC-SCNC: 10 MMOL/L
AST SERPL-CCNC: 38 U/L
BILIRUB DIRECT SERPL-MCNC: 0.1 MG/DL
BILIRUB INDIRECT SERPL-MCNC: 0.1 MG/DL
BILIRUB SERPL-MCNC: 0.2 MG/DL
BUN SERPL-MCNC: 12 MG/DL
CALCIUM SERPL-MCNC: 9.3 MG/DL
CHLORIDE SERPL-SCNC: 105 MMOL/L
CO2 SERPL-SCNC: 26 MMOL/L
CREAT SERPL-MCNC: 0.6 MG/DL
EGFR: 103 ML/MIN/1.73M2
GLUCOSE BLDC GLUCOMTR-MCNC: 128
GLUCOSE SERPL-MCNC: 104 MG/DL
HBA1C MFR BLD HPLC: 6
HCT VFR BLD CALC: 41.7 %
HGB BLD-MCNC: 13 G/DL
INR PPP: 1 RATIO
MCHC RBC-ENTMCNC: 28.4 PG
MCHC RBC-ENTMCNC: 31.2 GM/DL
MCV RBC AUTO: 91 FL
PLATELET # BLD AUTO: 159 K/UL
POTASSIUM SERPL-SCNC: 3.8 MMOL/L
PROT SERPL-MCNC: 7 G/DL
PT BLD: 11.4 SEC
RBC # BLD: 4.58 M/UL
RBC # FLD: 14.3 %
SODIUM SERPL-SCNC: 142 MMOL/L
WBC # FLD AUTO: 5.36 K/UL

## 2024-01-03 PROCEDURE — 82962 GLUCOSE BLOOD TEST: CPT

## 2024-01-03 PROCEDURE — 99215 OFFICE O/P EST HI 40 MIN: CPT | Mod: 25

## 2024-01-03 NOTE — PHYSICAL EXAM
[Alert] : alert [Well Nourished] : well nourished [No Acute Distress] : no acute distress [Well Developed] : well developed [Normal Sclera/Conjunctiva] : normal sclera/conjunctiva [EOMI] : extra ocular movement intact [No Proptosis] : no proptosis [Normal Oropharynx] : the oropharynx was normal [Thyroid Not Enlarged] : the thyroid was not enlarged [No Thyroid Nodules] : no palpable thyroid nodules [No Respiratory Distress] : no respiratory distress [No Accessory Muscle Use] : no accessory muscle use [Clear to Auscultation] : lungs were clear to auscultation bilaterally [Normal S1, S2] : normal S1 and S2 [Normal Rate] : heart rate was normal [Regular Rhythm] : with a regular rhythm [No Edema] : no peripheral edema [Pedal Pulses Normal] : the pedal pulses are present [Normal Bowel Sounds] : normal bowel sounds [Not Tender] : non-tender [Not Distended] : not distended [Soft] : abdomen soft [Normal Anterior Cervical Nodes] : no anterior cervical lymphadenopathy [Normal Posterior Cervical Nodes] : no posterior cervical lymphadenopathy [No Spinal Tenderness] : no spinal tenderness [Spine Straight] : spine straight [No Stigmata of Cushings Syndrome] : no stigmata of Cushings Syndrome [Normal Gait] : normal gait [Normal Strength/Tone] : muscle strength and tone were normal [No Rash] : no rash [Normal Reflexes] : deep tendon reflexes were 2+ and symmetric [No Tremors] : no tremors [Oriented x3] : oriented to person, place, and time [Obese] : obese [No LAD] : no lymphadenopathy [Supple] : the neck was supple [Normal Rate and Effort] : normal respiratory rate and effort [Abdominal Striae] : no abdominal striae [Acanthosis Nigricans] : no acanthosis nigricans

## 2024-01-03 NOTE — HISTORY OF PRESENT ILLNESS
[FreeTextEntry1] : 56-year-old female with past medical history of type 2 diabetes, thyroid cyst, hyperlipidemia, osteoporosis, BYERS, breast cancer status post bilateral mastectomy/chemotherapy/radiotherapy, cataracts, GERD, hyperparathyroidism s/p parathyroid adenoma is scheduled for type 2 diabetes follow up   Diabetes history: Diabetes diagnosed in 2021 Medication regimen: Stopped Ozempic 2 mg stopped 2 months, because she was feeling dizzy.  Currently not on any antidiabetes medication Recent fingerstick: Check BS two times a day showed  photo of glucometer BS 89 reports blood sugars are usually less than 140 Hypoglycemic events: Denies. Ophthalmology appointment: has an appointment with ophalmologist on 17th Jan 2024 and denies retinopathy. Peripheral Neuropathy: Yes CVD: Denies Infections: Denies  OBESITY  Weight history: She started gaining weight at the age of 44 before that her weight was stable Current weight: 185 lb Peak weight: 187lb Goals:  want to lose 100 lbs Weight loss medications: Ozempic, reports did not work Typical diet: Does not like her previous nutritionist, knows the low-fat low-carb diet Breakfast: boiled eggs, coffee with almond milk without sugar Lunch: Chicken soup, thin tortilla sometimes rice, does not want to try brown rice Dinner: salmon, potato, sometimes eat vegetable soup Snacks: two wafers sometimes,  Appetite: Feels hungry all the time Beverages: Water, coffee Alcohol: Denies Eating out: once a week Dietary challenges: reports feeling hungry all time Physical activity/exercise: does walking a little  No personal or family history of medullary thyroid cancer or MEN syndrome No history of pancreatitis No h/o gallstones No h/o glaucoma

## 2024-01-03 NOTE — REVIEW OF SYSTEMS
[Fatigue] : no fatigue [Decreased Appetite] : appetite not decreased [Recent Weight Gain (___ Lbs)] : recent weight gain: [unfilled] lbs [Blurred Vision] : no blurred vision [Neck Pain] : no neck pain [Chest Pain] : no chest pain [Palpitations] : no palpitations [Lower Ext Edema] : no lower extremity edema [Shortness Of Breath] : no shortness of breath [SOB on Exertion] : no shortness of breath on exertion [Nausea] : no nausea [Constipation] : no constipation [Abdominal Pain] : no abdominal pain [Vomiting] : no vomiting [Diarrhea] : no diarrhea [Polyuria] : no polyuria [Joint Pain] : no joint pain [Muscle Weakness] : muscle weakness [Headaches] : no headaches [Dizziness] : no dizziness [Tremors] : no tremors [Pain/Numbness of Digits] : no pain/numbness of digits [Polydipsia] : no polydipsia [Cold Intolerance] : no cold intolerance

## 2024-01-03 NOTE — ADDENDUM
[FreeTextEntry1] :  minutes spent the time noted on the day of this patient encounter preparing for, reviewing records, providing and documenting the above E/M service and 50% of time spent face to face counseling and education provided to patient on disease course, and treatment/management. All questions and concerns were answered and addressed in detail.

## 2024-01-03 NOTE — ASSESSMENT
[FreeTextEntry1] : 1) Type 2 Diabetes  A1c has become worse now 6.0% Not on any antidiabetes medication, could not tolerate Ozempic due to dizziness. Discussed that we will attempt to prescribe Mounjaro, if approved by her insurance.  If the insurance denies then will start low-dose metformin.   PLAN: Start Mounjaro 0.25 mg weekly - Advised patient to measure sugars randomly once a day have a log book of the numbers. Patient to bring log book prior to next Endocrine appointment -Ophthalmology on 17 January 2024 - Medication compliance re-emphasized. -Provided diabetes educator/nutritionist referral   OBESITY BMI 38.67 with comorbidities including Haji Patient was unable to tolerate Ozempic and she tried for 2 years and she did not lose weight on Ozempic  Start food log Discussed low glycemic diet, Counseled on to choose whole grain carbohydrates, and to choose avoid sugar-sweetened beverages and include lean proteins, fruits, vegetables and healthy fats Prescribed Mounjaro 0.25 mg weekly . RTC in 2 months

## 2024-01-04 LAB — 25(OH)D3 SERPL-MCNC: 28.2 NG/ML

## 2024-01-10 ENCOUNTER — APPOINTMENT (OUTPATIENT)
Dept: ENDOCRINOLOGY | Facility: CLINIC | Age: 59
End: 2024-01-10

## 2024-01-19 ENCOUNTER — APPOINTMENT (OUTPATIENT)
Dept: ENDOCRINOLOGY | Facility: CLINIC | Age: 59
End: 2024-01-19
Payer: MEDICARE

## 2024-01-19 PROCEDURE — 99441: CPT | Mod: 93

## 2024-01-19 RX ORDER — SEMAGLUTIDE 1.34 MG/ML
4 INJECTION, SOLUTION SUBCUTANEOUS
Qty: 1 | Refills: 1 | Status: COMPLETED | COMMUNITY
Start: 2023-04-26 | End: 2024-01-19

## 2024-01-19 RX ORDER — SEMAGLUTIDE 1.34 MG/ML
2 INJECTION, SOLUTION SUBCUTANEOUS
Qty: 8 | Refills: 1 | Status: COMPLETED | COMMUNITY
Start: 2021-11-01 | End: 2024-01-19

## 2024-01-19 RX ORDER — SEMAGLUTIDE 2.68 MG/ML
8 INJECTION, SOLUTION SUBCUTANEOUS
Qty: 10 | Refills: 0 | Status: COMPLETED | COMMUNITY
Start: 2023-07-14 | End: 2024-01-19

## 2024-01-31 ENCOUNTER — APPOINTMENT (OUTPATIENT)
Dept: ENDOCRINOLOGY | Facility: CLINIC | Age: 59
End: 2024-01-31
Payer: MEDICARE

## 2024-01-31 PROCEDURE — G0108 DIAB MANAGE TRN  PER INDIV: CPT

## 2024-02-07 ENCOUNTER — APPOINTMENT (OUTPATIENT)
Dept: ENDOCRINOLOGY | Facility: CLINIC | Age: 59
End: 2024-02-07
Payer: MEDICARE

## 2024-02-07 PROCEDURE — 99215 OFFICE O/P EST HI 40 MIN: CPT

## 2024-02-07 NOTE — ASSESSMENT
[FreeTextEntry1] : 1) Type 2 Diabetes  A1c is 6.1% CGM Report interpretation ======================= Days measured January 20 5th-14th February ( 14 days) Daily average: 119 Daily chart shows that patient develops low sugars are around 6 AM approximately 68, denies any symptoms. AGP: Time In Target (): 98% Time Below Target Range (<80): 0% Time Above Target Range (>140): 2% Based upon the CGM data patient does not need any antidiabetes medication, could not tolerate Ozempic and Mounjaro due to dizziness.   PLAN: Continue to hold on any antidiabetes medication at this time - Advised patient to measure sugars randomly once a day have a log book of the numbers. Patient to bring log book prior to next Endocrine appointment Advised to reschedule the ophthalmology appointment advised to reschedule the ophthalmology appointment - Medication compliance re-emphasized.  OBESITY BMI 38.67 with comorbidities including Haji Patient was unable to tolerate Ozempic and she tried for 2 years and she did not lose weight on Ozempic Unable to tolerate Mounjaro Advised to start food log Discussed low glycemic diet, Counseled on to choose whole grain carbohydrates, and to choose avoid sugar-sweetened beverages and include lean proteins, fruits, vegetables and healthy fats Patient reports she just got recovered from dizziness, and does not want to try any new medications at this time however she is still interested in reducing her weight.  Discussed that she will follow-up in April 2024 and will decide on starting on GLP-1 agonist for obesity management.  Patient verbalized understanding agrees with the plan . RTC in 2 months.

## 2024-02-07 NOTE — HISTORY OF PRESENT ILLNESS
[FreeTextEntry1] :  This visit was provided via telehealth using real-time 2-way audio visual technology. The patient was located at home at the time of the visit. The provider, SWAPNIL NAVA, was located at the medical office located in [address] [place] [address] at the time of the visit. The patient and Provider participated in the telehealth encounter. Verbal consent given by the patient, self. This visit was converted to audio visit because of technical difficulties  58-year-old female with past medical history of type 2 diabetes, thyroid cyst, hyperlipidemia, osteoporosis, BYERS, breast cancer status post bilateral mastectomy/chemotherapy/radiotherapy, cataracts, GERD, hyperparathyroidism s/p parathyroid adenoma is scheduled for type 2 diabetes and obesity follow up  Diabetes history: Diabetes diagnosed in 2021 Medication regimen:   Unable to tolerate Mounjaro and Ozempic due to severe dizziness.  She started metformin with no side effects however she only takes it when the blood sugars are 200. Recent fingerstick: Has received a freestyle chloe CGM, checking her blood sugars via freestyle chloe see separate scanned report  CGM Report interpretation =======================  Days measured January 20 5th-14th February ( 14 days) Daily average: 119 Daily chart shows that patient develops low sugars are around 6 AM approximately 68, denies any symptoms. AGP: Time In Target (): 98% Time Below Target Range (<80): 0% Time Above Target Range (>140): 2% Hypoglycemic events: Now developing less hypoglycemia after stopping Mounjaro Ophthalmology appointment: Has canceled her ophthalmology appointment due to her sickness, she has to reschedule again and prior denies retinopathy. Peripheral Neuropathy: Yes CVD: Denies Infections: Denies  OBESITY  Weight history: She started gaining weight at the age of 44 before that her weight was stable Current weight: 185 lb Peak weight: 187lb Goals: want to lose 100 lbs Weight loss medications: Ozempic, reports did not work, Mounjaro caused dizziness Typical diet: Does not like her previous nutritionist, knows the low-fat low-carb diet Breakfast: boiled eggs, coffee with almond milk without sugar Lunch: Chicken soup, thin tortilla sometimes rice, does not want to try brown rice Dinner: salmon, potato, sometimes eat vegetable soup Snacks: two wafers sometimes, Appetite: Feels hungry all the time Beverages: Water, coffee Alcohol: Denies Eating out: once a week Dietary challenges: reports feeling hungry all time Physical activity/exercise: does walking a little  No personal or family history of medullary thyroid cancer or MEN syndrome No history of pancreatitis No h/o gallstones No h/o glaucoma

## 2024-02-21 ENCOUNTER — NON-APPOINTMENT (OUTPATIENT)
Age: 59
End: 2024-02-21

## 2024-02-28 ENCOUNTER — APPOINTMENT (OUTPATIENT)
Dept: HEPATOLOGY | Facility: CLINIC | Age: 59
End: 2024-02-28

## 2024-03-05 ENCOUNTER — APPOINTMENT (OUTPATIENT)
Dept: PODIATRY | Facility: CLINIC | Age: 59
End: 2024-03-05

## 2024-04-03 ENCOUNTER — APPOINTMENT (OUTPATIENT)
Dept: ENDOCRINOLOGY | Facility: CLINIC | Age: 59
End: 2024-04-03
Payer: MEDICARE

## 2024-04-03 VITALS
WEIGHT: 189 LBS | HEIGHT: 58 IN | OXYGEN SATURATION: 99 % | TEMPERATURE: 96.2 F | HEART RATE: 75 BPM | RESPIRATION RATE: 16 BRPM | DIASTOLIC BLOOD PRESSURE: 77 MMHG | SYSTOLIC BLOOD PRESSURE: 119 MMHG | BODY MASS INDEX: 39.67 KG/M2

## 2024-04-03 DIAGNOSIS — E66.9 OBESITY, UNSPECIFIED: ICD-10-CM

## 2024-04-03 PROCEDURE — 82962 GLUCOSE BLOOD TEST: CPT

## 2024-04-03 PROCEDURE — 99214 OFFICE O/P EST MOD 30 MIN: CPT

## 2024-04-03 NOTE — ASSESSMENT
[FreeTextEntry1] : Type 2 diabetes A1c is 6.6% CGM Report interpretation ======================= Days measured January 20 5th-14th February ( 14 days) Daily average: 119 Daily chart shows that patient develops low sugars are around 6 AM approximately 68, denies any symptoms. AGP: Time In Target (): 98% Time Below Target Range (<80): 0% Time Above Target Range (>140): 2%   PLAN: Discussed to take metformin 500 mg with dinner daily atleast  Continue to monitor BS via CGM - Medication compliance re-emphasized.  OBESITY BMI 39.57 with comorbidities including Haji,cirhosis Patient was unable to tolerate Ozempic and Mounjaro Counseled on to choose whole grain carbohydrates, and to choose avoid sugar-sweetened beverages and include lean proteins, fruits, vegetables and healthy fats Will discuss with hepatologist if she can bariatric sx and if we can start contrave,.  . RTC in 2 months.

## 2024-04-03 NOTE — REVIEW OF SYSTEMS
[Decreased Appetite] : decreased appetite [Recent Weight Gain (___ Lbs)] : recent weight gain: [unfilled] lbs [Pain/Numbness of Digits] : pain/numbness of digits [Fatigue] : no fatigue [Blurred Vision] : no blurred vision [Neck Pain] : no neck pain [Chest Pain] : no chest pain [Palpitations] : no palpitations [Lower Ext Edema] : no lower extremity edema [Shortness Of Breath] : no shortness of breath [Cough] : no cough [SOB on Exertion] : no shortness of breath on exertion [Nausea] : no nausea [Constipation] : no constipation [Abdominal Pain] : no abdominal pain [Vomiting] : no vomiting [Polyuria] : no polyuria [Headaches] : no headaches [Dizziness] : no dizziness [Tremors] : no tremors [Polydipsia] : no polydipsia [Cold Intolerance] : no cold intolerance

## 2024-04-03 NOTE — PHYSICAL EXAM
[Alert] : alert [Obese] : obese [No Acute Distress] : no acute distress [Normal Sclera/Conjunctiva] : normal sclera/conjunctiva [Normal Oropharynx] : the oropharynx was normal [No LAD] : no lymphadenopathy [Supple] : the neck was supple [Thyroid Not Enlarged] : the thyroid was not enlarged [No Thyroid Nodules] : no palpable thyroid nodules [No Respiratory Distress] : no respiratory distress [No Accessory Muscle Use] : no accessory muscle use [Normal Rate and Effort] : normal respiratory rate and effort [Clear to Auscultation] : lungs were clear to auscultation bilaterally [Normal S1, S2] : normal S1 and S2 [Normal Rate] : heart rate was normal [Regular Rhythm] : with a regular rhythm [No Edema] : no peripheral edema [Pedal Pulses Normal] : the pedal pulses are present [Normal Bowel Sounds] : normal bowel sounds [Not Tender] : non-tender [Not Distended] : not distended [Soft] : abdomen soft [Normal Anterior Cervical Nodes] : no anterior cervical lymphadenopathy [No Spinal Tenderness] : no spinal tenderness [Spine Straight] : spine straight [No Stigmata of Cushings Syndrome] : no stigmata of Cushings Syndrome [Normal Gait] : normal gait [Normal Strength/Tone] : muscle strength and tone were normal [No Rash] : no rash [Normal Reflexes] : deep tendon reflexes were 2+ and symmetric [No Tremors] : no tremors [Oriented x3] : oriented to person, place, and time [Abdominal Striae] : no abdominal striae [Acanthosis Nigricans] : no acanthosis nigricans

## 2024-04-03 NOTE — HISTORY OF PRESENT ILLNESS
[FreeTextEntry1] : 58-year-old female with past medical history of bilateral mastectomy/chemotherapy/radiotherapy, cataracts, GERD, hyperparathyroidism s/p parathyroid adenoma is scheduled for type 2 diabetes and obesity follow up  Diabetes history: Diabetes diagnosed in 2021 Medication regimen: Takes metformin intermittently however she only takes it when the blood sugars are 200. Recent fingerstick: Has received a freestyle chloe CGM, checking her blood sugars via freestyle chloe see separate scanned report  CGM Report interpretation ======================= Days measured March 22nd-April 4th, 2024( 14 days) Daily average: 126 Time In Target (): 96% Time Below Target Range (<80): 0% Time Above Target Range (>140): 4% Hypoglycemic events: Denies Ophthalmology appointment: denies retinopathy. Peripheral Neuropathy: Yes CVD: Denies Infections: Denies  OBESITY  Weight history: She started gaining weight at the age of 44 before that her weight was stable Current weight: 185 lb Peak weight: 189lb Goals: want to lose 100 lbs Weight loss medications: She has tried Ozempic and Mounjaro, developed severe dizziness Typical diet: Reports she eats low carb and fat diet, however still not able to lose weight Breakfast: boiled eggs, coffee with almond milk without sugar Lunch: Chicken soup, thin tortilla sometimes rice, does not want to try brown rice Dinner: salmon, potato, sometimes eat vegetable soup Snacks: two wafers sometimes, Appetite: Does not feel hungry now Beverages: Water, coffee Alcohol: Denies Physical activity/exercise: does walking a little  No personal or family history of medullary thyroid cancer or MEN syndrome No history of pancreatitis No h/o gallstones No h/o glaucoma

## 2024-04-04 LAB
ALBUMIN SERPL ELPH-MCNC: 4.3 G/DL
ALP BLD-CCNC: 94 U/L
ALT SERPL-CCNC: 54 U/L
ANION GAP SERPL CALC-SCNC: 11 MMOL/L
AST SERPL-CCNC: 41 U/L
BILIRUB SERPL-MCNC: 0.3 MG/DL
BUN SERPL-MCNC: 15 MG/DL
CALCIUM SERPL-MCNC: 9.6 MG/DL
CHLORIDE SERPL-SCNC: 105 MMOL/L
CHOLEST SERPL-MCNC: 207 MG/DL
CO2 SERPL-SCNC: 29 MMOL/L
CREAT SERPL-MCNC: 0.75 MG/DL
EGFR: 92 ML/MIN/1.73M2
GLUCOSE BLDC GLUCOMTR-MCNC: 132
GLUCOSE SERPL-MCNC: 84 MG/DL
HBA1C MFR BLD HPLC: 6.6
HDLC SERPL-MCNC: 70 MG/DL
LDLC SERPL CALC-MCNC: 115 MG/DL
NONHDLC SERPL-MCNC: 137 MG/DL
POTASSIUM SERPL-SCNC: 4.1 MMOL/L
PROT SERPL-MCNC: 7.8 G/DL
SODIUM SERPL-SCNC: 145 MMOL/L
TRIGL SERPL-MCNC: 124 MG/DL

## 2024-04-10 ENCOUNTER — RX RENEWAL (OUTPATIENT)
Age: 59
End: 2024-04-10

## 2024-04-22 ENCOUNTER — APPOINTMENT (OUTPATIENT)
Dept: ENDOCRINOLOGY | Facility: CLINIC | Age: 59
End: 2024-04-22
Payer: MEDICARE

## 2024-04-22 DIAGNOSIS — R73.03 PREDIABETES.: ICD-10-CM

## 2024-04-22 DIAGNOSIS — E66.9 OBESITY, UNSPECIFIED: ICD-10-CM

## 2024-04-22 PROCEDURE — 99441: CPT

## 2024-04-24 ENCOUNTER — APPOINTMENT (OUTPATIENT)
Dept: GASTROENTEROLOGY | Facility: CLINIC | Age: 59
End: 2024-04-24
Payer: MEDICARE

## 2024-04-24 ENCOUNTER — APPOINTMENT (OUTPATIENT)
Dept: ENDOCRINOLOGY | Facility: CLINIC | Age: 59
End: 2024-04-24
Payer: MEDICARE

## 2024-04-24 VITALS
TEMPERATURE: 97.2 F | OXYGEN SATURATION: 98 % | HEART RATE: 92 BPM | HEIGHT: 58 IN | RESPIRATION RATE: 16 BRPM | SYSTOLIC BLOOD PRESSURE: 136 MMHG | DIASTOLIC BLOOD PRESSURE: 86 MMHG | BODY MASS INDEX: 40.09 KG/M2 | WEIGHT: 191 LBS

## 2024-04-24 VITALS
TEMPERATURE: 97.6 F | BODY MASS INDEX: 39.67 KG/M2 | WEIGHT: 189 LBS | OXYGEN SATURATION: 95 % | HEART RATE: 81 BPM | HEIGHT: 58 IN | DIASTOLIC BLOOD PRESSURE: 82 MMHG | SYSTOLIC BLOOD PRESSURE: 121 MMHG

## 2024-04-24 DIAGNOSIS — E16.1 OTHER HYPOGLYCEMIA: ICD-10-CM

## 2024-04-24 DIAGNOSIS — R10.84 GENERALIZED ABDOMINAL PAIN: ICD-10-CM

## 2024-04-24 DIAGNOSIS — K21.9 GASTRO-ESOPHAGEAL REFLUX DISEASE W/OUT ESOPHAGITIS: ICD-10-CM

## 2024-04-24 DIAGNOSIS — R19.7 DIARRHEA, UNSPECIFIED: ICD-10-CM

## 2024-04-24 DIAGNOSIS — R10.13 EPIGASTRIC PAIN: ICD-10-CM

## 2024-04-24 DIAGNOSIS — Z86.010 PERSONAL HISTORY OF COLONIC POLYPS: ICD-10-CM

## 2024-04-24 DIAGNOSIS — K75.4 AUTOIMMUNE HEPATITIS: ICD-10-CM

## 2024-04-24 PROCEDURE — 99215 OFFICE O/P EST HI 40 MIN: CPT

## 2024-04-24 PROCEDURE — 95251 CONT GLUC MNTR ANALYSIS I&R: CPT

## 2024-04-24 PROCEDURE — 99214 OFFICE O/P EST MOD 30 MIN: CPT

## 2024-04-24 RX ORDER — DICYCLOMINE HYDROCHLORIDE 10 MG/1
10 CAPSULE ORAL 3 TIMES DAILY
Qty: 90 | Refills: 3 | Status: COMPLETED | COMMUNITY
Start: 2023-04-12 | End: 2024-04-24

## 2024-04-24 RX ORDER — URSODIOL 500 MG/1
500 TABLET ORAL
Qty: 60 | Refills: 5 | Status: COMPLETED | COMMUNITY
Start: 2021-04-21 | End: 2024-04-24

## 2024-04-24 RX ORDER — METFORMIN HYDROCHLORIDE 500 MG/1
500 TABLET, COATED ORAL
Qty: 30 | Refills: 0 | Status: COMPLETED | COMMUNITY
Start: 2024-01-19 | End: 2024-04-24

## 2024-04-24 RX ORDER — ALBUTEROL SULFATE 90 UG/1
108 (90 BASE) INHALANT RESPIRATORY (INHALATION)
Qty: 7 | Refills: 0 | Status: ACTIVE | COMMUNITY
Start: 2024-04-11

## 2024-04-24 RX ORDER — OMEPRAZOLE 40 MG/1
40 CAPSULE, DELAYED RELEASE ORAL
Qty: 30 | Refills: 3 | Status: COMPLETED | COMMUNITY
Start: 2022-08-31 | End: 2024-04-24

## 2024-04-24 RX ORDER — CALCIPOTRIENE 50 UG/G
0.01 CREAM TOPICAL
Qty: 60 | Refills: 0 | Status: COMPLETED | COMMUNITY
Start: 2022-12-27 | End: 2024-04-24

## 2024-04-24 RX ORDER — ISOPROPYL ALCOHOL 0.7 ML/ML
SWAB TOPICAL
Qty: 100 | Refills: 0 | Status: COMPLETED | COMMUNITY
Start: 2021-11-19 | End: 2024-04-24

## 2024-04-24 RX ORDER — BLOOD SUGAR DIAGNOSTIC
STRIP MISCELLANEOUS
Qty: 1 | Refills: 0 | Status: COMPLETED | COMMUNITY
Start: 2023-05-19 | End: 2024-04-24

## 2024-04-24 RX ORDER — CETIRIZINE 2.4 MG/ML
0.24 SOLUTION/ DROPS OPHTHALMIC
Qty: 60 | Refills: 0 | Status: ACTIVE | COMMUNITY
Start: 2024-04-11

## 2024-04-24 RX ORDER — DEXTROSE 3.75 G
4 TABLET,CHEWABLE ORAL
Qty: 1 | Refills: 0 | Status: ACTIVE | COMMUNITY
Start: 2024-04-24 | End: 1900-01-01

## 2024-04-24 RX ORDER — DICYCLOMINE HYDROCHLORIDE 10 MG/1
10 CAPSULE ORAL
Qty: 90 | Refills: 3 | Status: COMPLETED | COMMUNITY
Start: 2023-10-18 | End: 2024-04-24

## 2024-04-24 RX ORDER — PANTOPRAZOLE 40 MG/1
40 TABLET, DELAYED RELEASE ORAL DAILY
Qty: 30 | Refills: 2 | Status: COMPLETED | COMMUNITY
Start: 2023-10-18 | End: 2024-04-24

## 2024-04-24 RX ORDER — LORATADINE 10 MG/1
TABLET ORAL
Refills: 0 | Status: COMPLETED | COMMUNITY
End: 2024-04-24

## 2024-04-24 RX ORDER — CHOLESTYRAMINE 4 G/9G
4 POWDER, FOR SUSPENSION ORAL DAILY
Qty: 30 | Refills: 3 | Status: COMPLETED | COMMUNITY
Start: 2023-10-18 | End: 2024-04-24

## 2024-04-24 RX ORDER — ONDANSETRON 4 MG/1
4 TABLET, ORALLY DISINTEGRATING ORAL TWICE DAILY
Qty: 60 | Refills: 3 | Status: COMPLETED | COMMUNITY
Start: 2022-04-29 | End: 2024-04-24

## 2024-04-24 RX ORDER — POLYETHYLENE GLYCOL 3350 AND ELECTROLYTES WITH LEMON FLAVOR 236; 22.74; 6.74; 5.86; 2.97 G/4L; G/4L; G/4L; G/4L; G/4L
236 POWDER, FOR SOLUTION ORAL
Qty: 1 | Refills: 0 | Status: COMPLETED | COMMUNITY
Start: 2022-08-31 | End: 2024-04-24

## 2024-04-24 RX ORDER — ROSUVASTATIN CALCIUM 5 MG/1
5 TABLET, FILM COATED ORAL DAILY
Qty: 30 | Refills: 2 | Status: COMPLETED | COMMUNITY
Start: 2021-10-29 | End: 2024-04-24

## 2024-04-24 RX ORDER — SIMETHICONE 125 MG/1
125 TABLET, CHEWABLE ORAL
Qty: 120 | Refills: 3 | Status: ACTIVE | COMMUNITY
Start: 2024-04-24 | End: 1900-01-01

## 2024-04-24 RX ORDER — SUCRALFATE 1 G/10ML
1 SUSPENSION ORAL
Qty: 1200 | Refills: 2 | Status: COMPLETED | COMMUNITY
Start: 2023-01-09 | End: 2024-04-24

## 2024-04-24 RX ORDER — OMEPRAZOLE 20 MG/1
20 CAPSULE, DELAYED RELEASE ORAL DAILY
Qty: 30 | Refills: 0 | Status: COMPLETED | COMMUNITY
End: 2024-04-24

## 2024-04-24 RX ORDER — CHOLESTYRAMINE 4 G/9G
4 POWDER, FOR SUSPENSION ORAL DAILY
Qty: 30 | Refills: 3 | Status: COMPLETED | COMMUNITY
Start: 2023-01-09 | End: 2024-04-24

## 2024-04-24 RX ORDER — ISOPROPYL ALCOHOL 70 ML/100ML
SWAB TOPICAL
Qty: 1 | Refills: 5 | Status: COMPLETED | COMMUNITY
Start: 2023-05-19 | End: 2024-04-24

## 2024-04-24 RX ORDER — BLOOD-GLUCOSE METER
W/DEVICE EACH MISCELLANEOUS
Qty: 1 | Refills: 0 | Status: COMPLETED | COMMUNITY
Start: 2021-11-19 | End: 2024-04-24

## 2024-04-24 RX ORDER — ERGOCALCIFEROL 1.25 MG/1
1.25 MG CAPSULE, LIQUID FILLED ORAL
Qty: 4 | Refills: 0 | Status: COMPLETED | COMMUNITY
Start: 2023-12-20 | End: 2024-04-24

## 2024-04-24 RX ORDER — SIMETHICONE 125 MG/1
125 TABLET, CHEWABLE ORAL
Qty: 120 | Refills: 3 | Status: COMPLETED | COMMUNITY
Start: 2023-01-09 | End: 2024-04-24

## 2024-04-24 RX ORDER — CHOLESTYRAMINE 4 G/9G
4 POWDER, FOR SUSPENSION ORAL DAILY
Qty: 30 | Refills: 3 | Status: COMPLETED | COMMUNITY
Start: 2023-04-12 | End: 2024-04-24

## 2024-04-24 RX ORDER — ANASTROZOLE TABLETS 1 MG/1
1 TABLET ORAL
Qty: 30 | Refills: 0 | Status: COMPLETED | COMMUNITY
Start: 2023-01-04 | End: 2024-04-24

## 2024-04-24 RX ORDER — OMEPRAZOLE 40 MG/1
40 CAPSULE, DELAYED RELEASE ORAL
Qty: 30 | Refills: 3 | Status: COMPLETED | COMMUNITY
Start: 2023-04-12 | End: 2024-04-24

## 2024-04-24 RX ORDER — FLASH GLUCOSE SCANNING READER
EACH MISCELLANEOUS
Qty: 1 | Refills: 0 | Status: COMPLETED | COMMUNITY
Start: 2024-01-19 | End: 2024-04-24

## 2024-04-24 RX ORDER — LORATADINE 10 MG/1
10 TABLET ORAL
Qty: 30 | Refills: 3 | Status: COMPLETED | COMMUNITY
Start: 2023-10-18 | End: 2024-04-24

## 2024-04-24 RX ORDER — OMEPRAZOLE 40 MG/1
40 CAPSULE, DELAYED RELEASE ORAL
Qty: 30 | Refills: 3 | Status: ACTIVE | COMMUNITY
Start: 2024-04-24 | End: 1900-01-01

## 2024-04-24 RX ORDER — BLOOD SUGAR DIAGNOSTIC
STRIP MISCELLANEOUS 3 TIMES DAILY
Qty: 100 | Refills: 0 | Status: COMPLETED | COMMUNITY
Start: 2021-11-19 | End: 2024-04-24

## 2024-04-24 RX ORDER — LANCETS
EACH MISCELLANEOUS
Qty: 100 | Refills: 0 | Status: COMPLETED | COMMUNITY
Start: 2021-11-19 | End: 2024-04-24

## 2024-04-24 RX ORDER — FLASH GLUCOSE SENSOR
KIT MISCELLANEOUS
Qty: 6 | Refills: 1 | Status: COMPLETED | COMMUNITY
Start: 2024-01-19 | End: 2024-04-24

## 2024-04-24 RX ORDER — ACARBOSE 25 MG/1
25 TABLET ORAL 3 TIMES DAILY
Qty: 90 | Refills: 0 | Status: ACTIVE | COMMUNITY
Start: 2024-04-24 | End: 1900-01-01

## 2024-04-24 RX ORDER — CHOLESTYRAMINE 4 G/9G
4 POWDER, FOR SUSPENSION ORAL DAILY
Qty: 30 | Refills: 3 | Status: ACTIVE | COMMUNITY
Start: 2024-04-24 | End: 1900-01-01

## 2024-04-24 RX ORDER — TIRZEPATIDE 2.5 MG/.5ML
2.5 INJECTION, SOLUTION SUBCUTANEOUS
Qty: 1 | Refills: 1 | Status: COMPLETED | COMMUNITY
Start: 2024-01-03 | End: 2024-04-24

## 2024-04-24 RX ORDER — DICYCLOMINE HYDROCHLORIDE 10 MG/1
10 CAPSULE ORAL 3 TIMES DAILY
Qty: 90 | Refills: 3 | Status: ACTIVE | COMMUNITY
Start: 2024-04-24 | End: 1900-01-01

## 2024-04-24 RX ORDER — SIMETHICONE 125 MG/1
125 TABLET, CHEWABLE ORAL
Qty: 120 | Refills: 3 | Status: COMPLETED | COMMUNITY
Start: 2023-04-12 | End: 2024-04-24

## 2024-04-24 NOTE — ASSESSMENT
[FreeTextEntry1] : Abdominal Pain: The patient complains of abdominal pain. The patient is to avoid nonsteroidal anti-inflammatory drugs and aspirin.  I recommend a low FOD-MAP diet.  I recommend a trial of Dicyclomine 10 mg tablet PO 3 times a day PRN for the abdominal pain. Dyspepsia: The patient complains of dyspeptic symptoms.  The patient was advised to continue to abide by an anti-gas (low FOD-MAP) diet.  The patient was previously given a pamphlet for anti-gas (low FOD-MAP).  The patient and I reviewed the anti-gas (low FOD-MAP)diet at length again. The patient is to continue on a trial of Simethicone one tablet 4 times a day p.r.n. abdominal pain and gas. GERD: The patient was advised to avoid late-night meals and dietary indiscretions.  The patient was advised to avoid fried and fatty foods.  The patient was advised to abide by an anti-GERD diet. The patient was given a pamphlet for anti-GERD.  The patient and I reviewed the anti-GERD diet at length. I recommend a trial of Omeprazole 40 mg once a day x 3 months for the symptoms. Diarrhea: The patient complains of diarrhea.  I recommend a low residue diet. The patient is to avoid fiber supplementation. The patient is to consider starting a trial of a probiotic such as Align once a day.    The symptoms are worse after meals.  The patient has a history of cholecystectomy.  I recommend continue on a trial of cholestyramine one packet once a day for possible bile induced diarrhea. If the symptoms persist, the patient may require a colonoscopy to assess for colitis versus other causes.  The patient was told of the risks and benefits of the procedure.  The patient was told of the risks of perforation, emergency surgery, bleeding, infections and missed lesions.  The patient agreed and will follow-up to reassess the symptoms.   Gastritis: The patient has a history of gastritis noted on prior upper endoscopy. The patient is to avoid nonsteroidal anti-inflammatory drugs and aspirin. I recommend continue on a trial of omeprazole 40 mg once a day x 3 months for the symptoms. Internal Hemorrhoids: The patient is to consider a trial of Anusol H. C. suppositories one per rectum nightly and Anusol HC2.5% cream apply to affected area twice a day p.r.n. hemorrhoidal bleeding or pain. I also recommend a trial of Calmoseptine cream apply to affected area twice a day for hemorrhoidal discomfort. I recommend Tucks pads for the hemorrhoids. I recommend Sitz baths twice a day for the hemorrhoids. I recommend avoid wearing tight underwear and use boxers. I recommend avoid touching the perineal area. The patient agreed to followup. Autoimmune Hepatitis: The prior liver biopsy performed at [facility] on September 24, 2020 revealed mild steatohepatitis, also moderate chronic inflammation and interface hepatitis (grade 2-3 of 4) and septal fibrous expansion and focal bridging fibrosis (stage 3-4). Biopsy samples were obtained and reviewed by Dr. Reji De La Cruz liver pathologist at [facility]: Moderate steatohepatitis and Mild-to moderate chronic hepatitis with differential of AIH or AI pattern induced by drug, and while no features, but PBC or PSC could not be ruled out either. There was bile duct paucity. There was F3 fibrosis. The fibroScan performed on March 30, 2022 revealed median shear wave speed of 2.00 m/s that corresponds to a median liver stiffness score of 12.0 kPa. With an IQ R/MED of 17%, a  dB/m (S3 steatosis and F3 bridging fibrosis). I recommend followup with her hepatologist, Dr. Ryann Weir for close monitoring. Prior Endoscopic Procedures: The patient had a prior upper endoscopy and colonoscopy performed by another gastroenterologist, Dr. Venkat Alejandre and Dr. Jerome Ochoa. I will try to obtain the prior upper endoscopy and colonoscopy reports. The patient is to sign another record release to obtain those records. Prior Hospitalization: The patient was previously hospitalized at the [facility] for chest pain and shortness of breath. The patient had blood work and imaging studies to assess the symptoms. I will try to obtain the hospital records, blood work and imaging studies performed in the hospital. The patient is to sign a record release to obtain those records. History of colonic polyps: The patient was found to have colonic polyps on prior colonoscopy. I recommend a repeat colonoscopy in 1 year to reassess for colonic polyps pending patient's health unless symptomatic. The patient is aware of the risk of colonic polyps progressing to colon cancer. The patient agreed and will follow up for the procedure. Prior Endoscopic Procedures: The patient had a prior upper endoscopy and colonoscopy performed by another gastroenterologist. I will try to obtain the prior upper endoscopy and colonoscopy reports. The patient is to sign a record release to obtain those records. Follow-up: The patient is to follow-up in the office in 6 months to reassess the symptoms. The patient was told to call the office if any further problems.

## 2024-04-24 NOTE — HISTORY OF PRESENT ILLNESS
[de-identified] : The upper endoscopy performed at the [facility] [place] GI endoscopy suite on October 27, 2022 revealed mild diffuse bile gastritis. The gastric pathology performed on October 27, 2022 revealed esophageal mucosa with fragments of unremarkable squamous esophageal epithelium with a PASF stain that is negative for fungal microorganisms (esophagus), gastric antral mucosa with chronic inactive gastritis that was negative for Helicobacter pylori (antrum), gastric body mucosa with chronic inactive gastritis that was negative for Helicobacter pylori and a gastric fundic gland polyp (body of the stomach) and duodenal mucosa with preserved villous architecture with no parasites or increased intraepithelial lymphocytes identified (duodenum). \par   [FreeTextEntry1] : The MRI/MRCP of the abdomen with and without IV contrast performed on August 30, 2022 revealed enlarged, fatty liver. Subtle contour nodularity/induration with no evidence of portal hypertension. \par  The MRI of the liver with and without IV contrast performed on May 4, 2021 revealed unremarkable MRI of the pancreas, mildly enlarged liver with associated fatty infiltration, status post right mastectomy, status postcholecystectomy.\par   [de-identified] : The abdominal ultrasound performed on August 2, 2023 revealed echogenic liver parenchyma compatible with infiltrative process such as hepatic steatosis or hepatocellular disease.  The abdominal ultrasound performed on November 15, 2021 revealed diffusely echogenic liver which can be seen in fatty infiltration or parenchymal liver disease with no focal masses. Status postcholecystectomy. There is a pancreatic tail was obscured by bowel gas but otherwise unremarkable examination.   [de-identified] : The fibroScan performed on March 30, 2022 revealed median shear wave speed of 2.00 m/s that corresponds to a median liver stiffness score of 12.0 kPa. With an IQ R/MED of 17%, a  dB/m (S3 steatosis and F3 bridging fibrosis). \par  \par  \par  The bone scan performed on April 21, 2021 revealed no bone scan evidence of metastatic disease to the bone.\par  \par  The chest x-ray performed on September 13, 2021 revealed an unremarkable chest x-ray. \par

## 2024-04-30 PROBLEM — E16.1 IDIOPATHIC POSTPRANDIAL HYPOGLYCEMIA: Status: RESOLVED | Noted: 2024-04-30 | Resolved: 2024-04-30

## 2024-04-30 PROBLEM — E16.1 IDIOPATHIC POSTPRANDIAL HYPOGLYCEMIA: Status: ACTIVE | Noted: 2024-04-30

## 2024-04-30 NOTE — HISTORY OF PRESENT ILLNESS
[FreeTextEntry1] : 58-year-old female with past medical history of bilateral mastectomy/chemotherapy/radiotherapy, cataracts, GERD, hyperparathyroidism s/p parathyroid adenoma is scheduled for dizziness due to hypoglycemia today  Patient has been having intermittent episodes of dizziness and when she checks her BS they are around 50s at the time of dizziness, sx improves after correction. PCP has done a lot of blood work and thinks she is having somogyi effect.  Freestyle chloe CGM reviewed  ( Apr 11-Apr 24th) ( See separate scanned report) Apr 13th @ 5: PM BS: 201 and dropped to 52 at 6:00 pm April 14TH @ 11:00 am: BS: 237 and then dropped to 54 at 12:00 pm Apr 16ht @ 3:00 pm: BS:200s and then dropped to 62 at 5:00 pm There is a consistent pattern that some days BS rises to 200 followed by hypoglycemia in next 1-2 hour. Time in Target range is 96% Patient reports she is not eating too much food, but is very concerned of hypoglycemia and therefore eats frequent small meals andriy boiled eggs States she has noticed whenever she eats tortillas, her sugars rises initially and then drops.

## 2024-04-30 NOTE — ASSESSMENT
[FreeTextEntry1] : Patient is having hypoglycemia andriy after the hyperglycemia of 200s, proved on CGM monitoring reading Most likely patient is having postprandial Hypoglycemia however unclear cause as patient never had Bariatric surgery Will give a trial of acarbose first,  Discussed with patient to make a food diary with BS log and bring on next week  If patient continues hypoglycemia despite acarbose, will proceed to 24-48 hour fast and will start hypoglycemia workup to rule out insulinoma andriy as patient has hx of parathyroid adenoma s/p resection Patient had MRI abdomen in the past, that showed normal pancreas.   Counselled patient on risk and adverse effect of acarbose.  Counselled on hypoglycemia S/S and management. Discussed not to overcorrect the hypoglycemia If hypoglycemia occurs, she should use 15 gram of glucose tab.  Discussed with hepatologist regarding acarbose use in setting of compensated cirrhosis.  RTC in May 2024.

## 2024-05-07 ENCOUNTER — APPOINTMENT (OUTPATIENT)
Dept: RHEUMATOLOGY | Facility: CLINIC | Age: 59
End: 2024-05-07

## 2024-05-07 VITALS
WEIGHT: 192 LBS | BODY MASS INDEX: 40.3 KG/M2 | OXYGEN SATURATION: 97 % | RESPIRATION RATE: 16 BRPM | TEMPERATURE: 98 F | HEART RATE: 72 BPM | HEIGHT: 58 IN

## 2024-05-09 ENCOUNTER — APPOINTMENT (OUTPATIENT)
Dept: ENDOCRINOLOGY | Facility: CLINIC | Age: 59
End: 2024-05-09
Payer: MEDICARE

## 2024-05-09 VITALS
BODY MASS INDEX: 40.3 KG/M2 | RESPIRATION RATE: 16 BRPM | TEMPERATURE: 97.5 F | HEIGHT: 58 IN | HEART RATE: 93 BPM | OXYGEN SATURATION: 96 % | WEIGHT: 192 LBS

## 2024-05-09 DIAGNOSIS — E16.2 HYPOGLYCEMIA, UNSPECIFIED: ICD-10-CM

## 2024-05-09 LAB — GLUCOSE BLDC GLUCOMTR-MCNC: 138

## 2024-05-09 PROCEDURE — 95251 CONT GLUC MNTR ANALYSIS I&R: CPT

## 2024-05-09 PROCEDURE — 99214 OFFICE O/P EST MOD 30 MIN: CPT

## 2024-05-09 PROCEDURE — 82962 GLUCOSE BLOOD TEST: CPT

## 2024-05-09 RX ORDER — BLOOD-GLUCOSE METER
W/DEVICE EACH MISCELLANEOUS
Qty: 1 | Refills: 0 | Status: ACTIVE | COMMUNITY
Start: 2024-05-09 | End: 1900-01-01

## 2024-05-09 RX ORDER — BLOOD SUGAR DIAGNOSTIC
STRIP MISCELLANEOUS
Qty: 90 | Refills: 0 | Status: ACTIVE | COMMUNITY
Start: 2024-05-09 | End: 1900-01-01

## 2024-05-09 NOTE — ASSESSMENT
[FreeTextEntry1] : Patient has improved postprandial hypoglycemia per CGM data after starting acarbose 25 mg twice daily however still developing fasting hypoglycemia.  Confirmed multiple tried is positive.  Given patient has history of primary hyperparathyroidism may need to rule out MEN 1.  Advised patient to fast the whole midnight and to come in the morning to the lab, continue to check her blood sugars with the chloe if her blood sugars are less than 60s then she should asked lab to draw the blood for insulin level, C-peptide, sulfonylurea screen, proinsulin.  Ordered the test with instructions to lab. Discussed that if this test is unsuccessful then she needs to be hospitalized for the 24 to 48-hour fast.  Continue acarbose at this time.  RTC in 2 weeks

## 2024-05-09 NOTE — HISTORY OF PRESENT ILLNESS
[FreeTextEntry1] : 58-year-old female with past medical history of bilateral mastectomy/chemotherapy/radiotherapy, cataracts, GERD, hyperparathyroidism s/p parathyroid adenoma is scheduled for dizziness due to hypoglycemia follow up  Patient started developing intermittent episodes of dizziness few months ago especially after she stopped Ozempic and when she checks her BS they are around 50s with the glucometer and 60s on the freestyle chloe at the time of dizziness, sx improves after correction.  The freestyle chloe CGM was also showing postprandial hyperglycemia during the day followed by hypoglycemia 1 to 2 hours after.  Therefore the acarbose was started.  Patient has been taking acarbose 25 mg twice daily with meals.  And her postprandial hyperglycemia followed by hypoglycemic episodes have improved however patient is still developing fasting hypoglycemia which is also shown in the freestyle chloe CGM.  She states she does not eat or do something to correct her fasting hypoglycemia and within few seconds it improves on its own.  At the time of hypoglycemia she feels dizziness and diaphoresis.  Never lost consciousness. Freestyle chloe CGM reviewed ( Apr 26-midnight) ( See separate scanned report) May 1@8 AM: FBS: 66 and at 11 AM: 68 (confirmed with glucometer it was 50s) May 7 at 11 AM: FBS: 69 May 8 at 4 AM: 69 and at 9 AM 68 May 9 at 9 AM: 65  Patient states that she does not correct her blood sugar at that time, and it improves on its own after a few seconds Patient reports she is not eating too much food, but is very concerned of hypoglycemia and therefore eats frequent small meals andriy boiled eggs, States she has noticed whenever she eats tortillas, her sugars rises initially and then drops.  Patient has history of primary hyperparathyroidism diagnosed in 1990s when she had hypercalcemia, she states that she had parathyroidectomy of 1 parathyroid gland was done.  After that she never required repeat surgery for hypercalcemia or primary hyperparathyroidism.

## 2024-05-09 NOTE — REVIEW OF SYSTEMS
[Fatigue] : fatigue [Decreased Appetite] : appetite not decreased [Recent Weight Gain (___ Lbs)] : recent weight gain: [unfilled] lbs [Blurred Vision] : no blurred vision [Neck Pain] : no neck pain [Chest Pain] : no chest pain [Palpitations] : no palpitations [Lower Ext Edema] : no lower extremity edema [Shortness Of Breath] : no shortness of breath [SOB on Exertion] : no shortness of breath on exertion [Nausea] : no nausea [Constipation] : no constipation [Abdominal Pain] : no abdominal pain [Vomiting] : no vomiting [Diarrhea] : no diarrhea [Headaches] : no headaches [Dizziness] : no dizziness [Tremors] : no tremors [Pain/Numbness of Digits] : no pain/numbness of digits [Polydipsia] : no polydipsia [Cold Intolerance] : no cold intolerance

## 2024-05-10 ENCOUNTER — NON-APPOINTMENT (OUTPATIENT)
Age: 59
End: 2024-05-10

## 2024-05-13 ENCOUNTER — NON-APPOINTMENT (OUTPATIENT)
Age: 59
End: 2024-05-13

## 2024-05-16 ENCOUNTER — APPOINTMENT (OUTPATIENT)
Dept: ENDOCRINOLOGY | Facility: CLINIC | Age: 59
End: 2024-05-16
Payer: MEDICARE

## 2024-05-16 VITALS
HEART RATE: 74 BPM | TEMPERATURE: 98 F | WEIGHT: 192 LBS | RESPIRATION RATE: 16 BRPM | OXYGEN SATURATION: 98 % | BODY MASS INDEX: 40.3 KG/M2 | DIASTOLIC BLOOD PRESSURE: 76 MMHG | HEIGHT: 58 IN | SYSTOLIC BLOOD PRESSURE: 122 MMHG

## 2024-05-16 DIAGNOSIS — E11.9 TYPE 2 DIABETES MELLITUS W/OUT COMPLICATIONS: ICD-10-CM

## 2024-05-16 DIAGNOSIS — Z97.8 PRESENCE OF OTHER SPECIFIED DEVICES: ICD-10-CM

## 2024-05-16 DIAGNOSIS — E16.1 OTHER HYPOGLYCEMIA: ICD-10-CM

## 2024-05-16 PROCEDURE — 99214 OFFICE O/P EST MOD 30 MIN: CPT

## 2024-05-16 PROCEDURE — 95251 CONT GLUC MNTR ANALYSIS I&R: CPT

## 2024-05-16 RX ORDER — SEMAGLUTIDE 0.68 MG/ML
2 INJECTION, SOLUTION SUBCUTANEOUS
Qty: 4 | Refills: 0 | Status: ACTIVE | COMMUNITY
Start: 2024-05-16 | End: 1900-01-01

## 2024-05-19 PROBLEM — Z97.8 USES SELF-APPLIED CONTINUOUS GLUCOSE MONITORING DEVICE: Status: ACTIVE | Noted: 2024-02-07

## 2024-05-19 PROBLEM — E11.9 TYPE 2 DIABETES MELLITUS: Status: ACTIVE | Noted: 2021-11-01

## 2024-05-19 PROBLEM — E16.1 REACTIVE HYPOGLYCEMIA: Status: ACTIVE | Noted: 2024-04-24

## 2024-05-19 NOTE — REVIEW OF SYSTEMS
[Fatigue] : fatigue [Decreased Appetite] : decreased appetite [Recent Weight Gain (___ Lbs)] : recent weight gain: [unfilled] lbs [Dizziness] : dizziness [Pain/Numbness of Digits] : pain/numbness of digits [Poor Balance] : poor balance [Recent Weight Loss (___ Lbs)] : no recent weight loss [Blurred Vision] : no blurred vision [Neck Pain] : no neck pain [Chest Pain] : no chest pain [Palpitations] : no palpitations [Lower Ext Edema] : no lower extremity edema [Shortness Of Breath] : no shortness of breath [SOB on Exertion] : no shortness of breath on exertion [Nausea] : no nausea [Constipation] : no constipation [Abdominal Pain] : no abdominal pain [Vomiting] : no vomiting [Diarrhea] : no diarrhea [Polyuria] : no polyuria [Headaches] : no headaches [Tremors] : no tremors [Polydipsia] : no polydipsia [Cold Intolerance] : no cold intolerance

## 2024-05-19 NOTE — ASSESSMENT
[FreeTextEntry1] : As Acarbose has reduced the hypoglycemic episodes. Patient has no more fasting hypoglycemia will hold off on 24 hour fast hypoglycemia test.  Patient has been gaining weight, discussed that Ozempic has been used off label to prevent reactive hypoglycemia in addition to type 2 diabetes Therefore will restart low dose Ozempic and see if she is developing dizziness again. Will stop acarbose. Counselled patient to follow up with neurologist for dizziness as she is having the symptoms even when her BS are better controlled  RTC in July 2024

## 2024-05-19 NOTE — ADDENDUM
[FreeTextEntry1] :  minutes spent the time noted on the day of this patient encounter preparing for, reviewing records, providing and documenting the above E/M service and 50% of time spent face to face counseling and education provided to patient on disease course, and treatment/management. All questions and concerns were answered and addressed in detail.\

## 2024-05-19 NOTE — HISTORY OF PRESENT ILLNESS
[FreeTextEntry1] : 58-year-old female with past medical history of bilateral mastectomy/chemotherapy/radiotherapy, cataracts, GERD, hyperparathyroidism s/p parathyroid adenoma is scheduled for dizziness due to hypoglycemia follow up  Patient started developing intermittent episodes of dizziness few months ago especially after she stopped Ozempic and when she checks her BS they are around 50s with the glucometer and 60s on the freestyle chloe at the time of dizziness, sx improves after correction. The freestyle chloe CGM data was also showing postprandial hyperglycemia during the day followed by hypoglycemia 1 to 2 hours after. Therefore the acarbose was started. Patient has been taking acarbose 25 mg twice daily with meals. And her postprandial hyperglycemia followed by hypoglycemic episodes have improved however patient sometimes still developing fasting hypoglycemia which is also shown in the freestyle chloe CGM. She states she does not eat or do something to correct her fasting hypoglycemia and within few seconds it improves on its own. At the time of hypoglycemia she feels dizziness and diaphoresis. Never lost consciousness. Patient was advised to fast overnight and give the blood work in the morning in the lab once her sugars drops <50 however she stayed in the lab for 6 hours but she did not become hypoglycemic Freestyle chloe CGM reviewed ( May 3-May 16th) (See separate scanned report) Time in Target range is 99% No more fasting hypoglycemia on CGM Is developing low BS around 60s from 4pm-6:00 pm, before that she develops high BS ~190s.  Reports acarbose is helping and her hypoglycemic episodes are less however still having symptoms of dizziness intermittently when her BS are not low.

## 2024-05-22 ENCOUNTER — NON-APPOINTMENT (OUTPATIENT)
Age: 59
End: 2024-05-22

## 2024-05-31 ENCOUNTER — APPOINTMENT (OUTPATIENT)
Dept: HEPATOLOGY | Facility: CLINIC | Age: 59
End: 2024-05-31
Payer: MEDICARE

## 2024-05-31 VITALS
BODY MASS INDEX: 40.51 KG/M2 | HEIGHT: 58 IN | RESPIRATION RATE: 16 BRPM | OXYGEN SATURATION: 98 % | TEMPERATURE: 97.7 F | DIASTOLIC BLOOD PRESSURE: 72 MMHG | WEIGHT: 193 LBS | HEART RATE: 72 BPM | SYSTOLIC BLOOD PRESSURE: 110 MMHG

## 2024-05-31 DIAGNOSIS — K74.00 HEPATIC FIBROSIS, UNSPECIFIED: ICD-10-CM

## 2024-05-31 DIAGNOSIS — K76.0 FATTY (CHANGE OF) LIVER, NOT ELSEWHERE CLASSIFIED: ICD-10-CM

## 2024-05-31 PROCEDURE — 99214 OFFICE O/P EST MOD 30 MIN: CPT

## 2024-05-31 RX ORDER — URSODIOL 500 MG/1
500 TABLET ORAL TWICE DAILY
Qty: 60 | Refills: 3 | Status: ACTIVE | COMMUNITY
Start: 2021-11-19 | End: 1900-01-01

## 2024-06-03 PROBLEM — K74.00 LIVER FIBROSIS: Status: ACTIVE | Noted: 2020-11-13

## 2024-06-03 PROBLEM — K76.0 NAFLD (NONALCOHOLIC FATTY LIVER DISEASE): Status: ACTIVE | Noted: 2023-07-17

## 2024-06-03 NOTE — PHYSICAL EXAM
[Spider Angioma] : Spider angioma(s) was(were) observed [Non-Tender] : non-tender [Palmar Erythema] : Palmar Erythema [General Appearance - Alert] : alert [General Appearance - Well Nourished] : well nourished [General Appearance - In No Acute Distress] : in no acute distress [General Appearance - Well Developed] : well developed [Sclera] : the sclera and conjunctiva were normal [Oropharynx] : the oropharynx was normal [Neck Appearance] : the appearance of the neck was normal [Jugular Venous Distention Increased] : there was no jugular-venous distention [Respiration, Rhythm And Depth] : normal respiratory rhythm and effort [Exaggerated Use Of Accessory Muscles For Inspiration] : no accessory muscle use [Auscultation Breath Sounds / Voice Sounds] : lungs were clear to auscultation bilaterally [Heart Rate And Rhythm] : heart rate was normal and rhythm regular [Heart Sounds] : normal S1 and S2 [Edema] : there was no peripheral edema [Bowel Sounds] : normal bowel sounds [Abdomen Soft] : soft [Abdomen Tenderness] : non-tender [Abdomen Mass (___ Cm)] : no abdominal mass palpated [] : no hepato-splenomegaly [Abdomen Hernia] : no hernia was discovered [Cervical Lymph Nodes Enlarged Posterior Bilaterally] : posterior cervical [Cervical Lymph Nodes Enlarged Anterior Bilaterally] : anterior cervical [Supraclavicular Lymph Nodes Enlarged Bilaterally] : supraclavicular [Axillary Lymph Nodes Enlarged Bilaterally] : axillary [No CVA Tenderness] : no ~M costovertebral angle tenderness [Inguinal Lymph Nodes Enlarged Bilaterally] : inguinal [No Spinal Tenderness] : no spinal tenderness [Abnormal Walk] : normal gait [Oriented To Time, Place, And Person] : oriented to person, place, and time [Impaired Insight] : insight and judgment were intact [Mood] : the mood was normal [Affect] : the affect was normal [Memory Recent] : recent memory was not impaired [Memory Remote] : remote memory was not impaired [Scleral Icterus] : No Scleral Icterus [Hepatojugular Reflux] : patient did not have a sustained hepatojugular reflux [Abdominal Bruit] : no abdominal bruit [Abdominal  Ascites] : no ascites [Asterixis] : no asterixis observed [Jaundice] : No jaundice [Depression] : no depression [Hallucinations] : ~T no ~M hallucinations [Delusions] : no ~T delusions [Suicidal Ideation] : no suicidal ideation [Homicidal Ideation] : no homicidal ideations [Preoccupiation With Violent Thoughts] : no preoccupation with violent thoughts [FreeTextEntry1] : Grossly intact

## 2024-06-03 NOTE — HISTORY OF PRESENT ILLNESS
[Fatigue] : denies fatigue [Malaise] : denies malaise [Diffuse Joint Pain (Arthralgias)] : arthralgias stable [Yellow Skin Or Eyes (Jaundice)] : denies jaundice [Abdominal Pain] : denies abdominal pain [Urine Tests Nonspecific Abnormal Findings Biliuria] : denies dark urine [Light Colored Bowel Movement (Acholic Stools)] : denies pale stools [Insomnia] : denies insomnia [Skin: Rash] : denies rash [Needlestick Exposure] : no needlestick exposure [Infected Sexual Partner] : no infected sexual partner [IV Drug Use] : no IV drug use [Tattoo] : no tattoos [Body Piercing] : no body piercing [Hemodialysis] : no hemodialysis [Transfusion before 1992] : no transfusion before 1992 [Transplant before 1992] : no transplant before 1992 [Incarceration] : no incarceration [Alcohol Abuse] : no alcohol abuse [Autoimmune Disorder] : no autoimmune disorder [Household Contact to HBV] : no household contact to HBV [Travel to Endemic Area] : no travel to an endemic area [Occupational Exposure] : no occupational exposure [Cocaine Use] : no cocaine use [de-identified] : Patient stated that do not need , preferred discussion in English.  59 yo obese (BMI 35->39->37)  Female with Hx of breast cancer (Dx in 2014, s/p b/l mastectomy, s/p chemo-RT, was on anastrazole, as per patient was changed by his oncologist, Dr. Miles), colon polyps, Hx of cholecystectomy (gallstones), Hx of parathyroidectomy (no records, per patient was told to have "benign tumor"), glaucoma, recent conjunctivitis, s/p liver biopsy by gastroenterologist, Dr. Alejandre ([phone]) because of abnormal liver tests. No liver test records available from that time. Biopsy was done 9/24/2020 at [facility]. The biopsy reports mild steatohepatitis, also moderate chronic inflammation and interface hepatitis (grade 2-3 of 4) and septal fibrous expansion and focal bridging fibrosis (stage 3-4).  Patient was not started on any medication for liver disease. Patient was sent for second opinion from Dr Miles`s office.    Biopsy samples were obtained and reviewed by Dr. Reji De La Cruz liver pathologist at [facility]: Moderate steatohepatitis and Mild-to moderate chronic hepatitis with differential of AIH or AI pattern induced by drug, and while no features, but PBC or PSC could not be ruled out either. There was bile duct paucity. There was F3 fibrosis. FibroScan also showed S3 steatosis and F3 fibrosis.  Patient`s liver enzymes were WNL except ALP. AI markers showed neg ZAIRA, minimally elevated IgG (< 1.1x or WNL) , weak pos ASMA (1:20), neg anti LKM and neg anti SLA. AMA was neg and IgM was nl.  Given the normal AI serology and normal transaminases, she was not started on any immunosuppressant. Given the mild elevation of ALP, and some bile duct paucity, she was started on Ursodiol, although there were no distinct features of PBC on liver biopsy, but as per report "could not be excluded".   MRI abd w/wo contrast and MRCP in 5/2021 showed unremarkable bile ducts and fatty liver.  She had bone scan, b/o Hx of breast cancer and solely ALP elevation, and it was negative for metastatic disease.  ALP has not changed significantly on Ursodiol.  She denied any prior Hx of liver disease, FHx of liver disease, has obesity, being born in [facility] but no other risk factors. Denied OTC or herbal meds/supplements. Denied alcohol.  She was noted to have few spider naevi, palmar erythema.   S/p Moderna 1st 3/1/21 2nd 3/29/21, had high fever for 3 days.  She has been following dermatology or maculopapular lesion, small, red, on extremities. Reportedly, she was told to have psoriasis, only on topical treatment.  She had colonoscopy on 8/4/21:  6 mm transverse colon sessile polyp, 7 mm cecum polyp, 3 mm polyp rectum. No biopsy result on file, repeat was recommended in 3 years.  In 9/2021 she was noted significant weight gain and developed diabetes. Previously she was prediabetic. She reported that her PCP applied for Ozempic in past but was not approved (was prediabetic that time).   Given her obesity and DM, applied for Ozempic and was approved. She was started on it 11/2021 and reported that her appetite decreased since then, and has not experience other side effects. She changed her insurance from 12/1/21 and b/o that lost follow up. She has not seen any hepatology interim, RTC in 3/2021 and then had lapse in insurance again.  She had COVID-19 in 1/2022.  She had lapse in insurance after 3/2022 visit again (see task list). Reported that she was given Ozempic samples by endocrinology. Reported that she reduced Ozempic dose  back to 0.25 because was not sure that enough till her endocrinologist returns. Reports fasting blood sugar around 90, and does not measure post meal. She lost 5 lb compared to Nov 2021, but none since the beginning of the year.  1/2023 visit No new complaints. Awaiting disability.   4/12/23 follow up. She was hospitalized at [facility] b/o fever, chest tightness. No records. Methylprednisolone 4 mg, QVar inhaler,  Albuterol inhaler. Reports that was seeing Austyn Castillo outpatient was treated with antibiotics, but did not improve and was sent to hospital reportedly b/o hypoxia in 80s (around March 21 st).  He completed methylprednisolone.  PET scan was done 4/11/23, no report yet, was ordered by Dr. Miles.   7/2023 follow up. No new complaints. Remains on Ozempic 1 mg, but gained weight.   10/18/23 Patient denies any hospitalizations since the last visit. Patient denies abdominal pain, nausea or vomiting. Patient reports that she has diarrhea with the intake of the food. Denies melena or hematochezia. Patient reports appointment with Dr. Moya today. Patient reports taking Ozempic 2mg dose since about 2 months ago. Patient reports a little weight loss.   12/2023 follow up because she reported that had vertigo when restarted Ozempic after 1 week break and it resolved after she stopped Ozempic. She also measured FSBS in 70s when on Ozempic. Currenlty not on antidiabetic. Introduced her to Dr. Yee who will see patient 1/2024 for DM  and weight loss mgmt, hormonal w/u.  She is here for follow up. Her DM still not well controlled, reports both hypoglycemic and hyperglycemic episodes. C/o significant bloating from acarbose, although less hypoglycemic episodes. Reports feeling better when takes Ozempic, but more hypoglycemic episodes (?). Will get MR/MRCP, repeat labs.  [de-identified] : [facility] (moved in [place], last traveled >6 years ago)

## 2024-06-03 NOTE — ASSESSMENT
[FreeTextEntry1] : 59 yo Female with Hx of breast cancer (Dx in 2014, s/p b/l mastectomy, s/p chemo-RT, was on anastrazole, switched to exemestane as per her oncologist, Dr. Miles in 2021), obesity (BMI 35->39), colon polyps, Hx of cholecystectomy (gallstones), Hx of parathyroidectomy (no records, per patient was told to have "benign tumor"), glaucoma, recent conjunctivitis, was sent for liver biopsy by gastroenterologist, Dr. Alejandre ([phone]) because of abnormal liver tests. Biopsy was done 9/24/2020 at [facility]. The biopsy reports mild steatohepatitis, also moderate chronic inflammation and interface hepatitis (grade 2-3 of 4) and septal fibrous expansion and focal bridging fibrosis (stage 3-4). Reviewed with Liver pathologist and the consensus was : moderate steatohepatitis, mild-to moderate chronic hepatitis with differential of AIH or AI pattern induced by drug, and while no features, but PBC or PSC could not be ruled out either. There was bile duct paucity. There was F3 fibrosis. FibroScan also showed S3 steatosis and F3 fibrosis.   Moderate steatohepatitis - Discussed natural Hx of fatty liver and life style modifications in details - FibroScan S3, F3 - She was started on Ozempic in 10/2021. Denied Hx of medullary thyroid carcinoma or FHx of medullary thyroid carcinoma. Denied Hx of MEN2, no thyroid ca, no pheochromocytoma. --- C/w diet, exercise. --- C/w low dose statin. --- Now off Ozempic and on acarbose. Mgmt. per endocrinology. (F3 per tests, acarbose would be contraindicated if F4 cirrhosis)  Mild to moderate chronic hepatitis, suggestive of AIH based on biopsy or AI pattern drug induced - Meds reviewed, neither of them is linked to AI type liver injury - ZAIRA was neg (except once 1:80), ASMA weak pos 1:20, with repeat neg, SLA and LKM neg, IgG was elevated but < 1.1x ULN and now WNL - Liver enzymes were minimally elevated, and most recently normalized - Based on above did not meet criteria of treatment for AIH, been monitored (Quantiferon neg, TPMT normal), especially that steroid could worsen her weight, DM etc. - Per biopsy report, paucity of bile ducts, could not r/o PBC,PSC, but no typical histological findings, AMA neg, IgM nl, ANCA neg; PBC specific ZAIRA was ordered, but never resulted. Since ALP remained elevated, started on Ursodiol in 2/2021. - Rest of liver workup unrevealing - US 11/2020 with hepatic steatosis, s/p cholecystectomy; MRCP 5/4/21 fatty liver, otherwise unremarkable - Discussed with Dr. Miles: patient was on letrozole, but not since 8/2020, now being on exemestane --- C/w Ursodiol (liver enzymes now normalized). Reports that has difficulty with pill size, denies dysphagia, but requested to change to capsule. --- Rheum referral placed previously b/o pos ZAIRA, not seen yet, advised to schedule.  Advanced fibrosis (F3, plus stigmata of advanced CLD, and low PLT) w/o signs of decompensation - No ascites - No PSE -No EVs. Was referred to GI when PLT was < 150. ECO 10/2022, gastritis and internal hemorrhoids. Now PLT > 150 and LSM < 20 kPa. - Last MRI abd/MRCP 8/2022 w/o focal hepatic lesion. ---C/w q 6 months HCC screening, US abd w/o focal hepatic lesion 8/2023.  RTC 3 months

## 2024-06-03 NOTE — REVIEW OF SYSTEMS
[As Noted in HPI] : as noted in HPI [Eyesight Problems] : eyesight problems [Heartburn] : heartburn [Skin Lesions] : skin lesion [Negative] : Heme/Lymph [Fever] : no fever [Chills] : no chills [Feeling Poorly] : not feeling poorly [Feeling Tired] : not feeling tired [Chest Pain] : no chest pain [Lower Ext Edema] : no lower extremity edema [Shortness Of Breath] : no shortness of breath [Cough] : no cough [Abdominal Pain] : no abdominal pain [Vomiting] : no vomiting [Constipation] : no constipation [Diarrhea] : no diarrhea [Melena] : no melena [Dysuria] : no dysuria [Incontinence] : no incontinence [Joint Swelling] : no joint swelling [Joint Stiffness] : no joint stiffness [FreeTextEntry7] : Hx of reflux, controlled with diet and on famotidine [FreeTextEntry8] : urinary frequency for 3 years, being followed by Urology [FreeTextEntry9] : Knee pain and reports "bone pain" in lower extremities [de-identified] : small, circular, red, maculopapular lesions on extremities - following with dermatology [de-identified] : Hx of depression

## 2024-06-05 ENCOUNTER — LABORATORY RESULT (OUTPATIENT)
Age: 59
End: 2024-06-05

## 2024-06-05 LAB
25(OH)D3 SERPL-MCNC: 20.4 NG/ML
AFP-TM SERPL-MCNC: 9.3 NG/ML
ALBUMIN SERPL ELPH-MCNC: 4.3 G/DL
ALP BLD-CCNC: 93 U/L
ALT SERPL-CCNC: 53 U/L
ANION GAP SERPL CALC-SCNC: 14 MMOL/L
AST SERPL-CCNC: 47 U/L
BILIRUB DIRECT SERPL-MCNC: 0.1 MG/DL
BILIRUB INDIRECT SERPL-MCNC: 0.2 MG/DL
BILIRUB SERPL-MCNC: 0.3 MG/DL
BUN SERPL-MCNC: 11 MG/DL
CALCIUM SERPL-MCNC: 9.2 MG/DL
CHLORIDE SERPL-SCNC: 106 MMOL/L
CO2 SERPL-SCNC: 22 MMOL/L
CREAT SERPL-MCNC: 0.59 MG/DL
EGFR: 104 ML/MIN/1.73M2
GLUCOSE SERPL-MCNC: 102 MG/DL
HCT VFR BLD CALC: 43.7 %
HGB BLD-MCNC: 14.5 G/DL
INR PPP: 1.06 RATIO
MCHC RBC-ENTMCNC: 28.9 PG
MCHC RBC-ENTMCNC: 33.2 GM/DL
MCV RBC AUTO: 87.1 FL
PLATELET # BLD AUTO: 154 K/UL
POTASSIUM SERPL-SCNC: 3.7 MMOL/L
PROT SERPL-MCNC: 7.5 G/DL
PT BLD: 12.1 SEC
RBC # BLD: 5.02 M/UL
RBC # FLD: 14.6 %
SODIUM SERPL-SCNC: 142 MMOL/L
WBC # FLD AUTO: 6.48 K/UL

## 2024-06-06 LAB
ALBUMIN SERPL ELPH-MCNC: 4.2 G/DL
ALBUPE: 19 %
ALP BLD-CCNC: 91 U/L
ALPHA1UPE: 33.4 %
ALPHA2UPE: 20.4 %
ALT SERPL-CCNC: 52 U/L
ANION GAP SERPL CALC-SCNC: 16 MMOL/L
AST SERPL-CCNC: 47 U/L
B-OH-BUTYR SERPL-SCNC: 0.1 MMOL/L
BETAUPE: 19.1 %
BILIRUB SERPL-MCNC: 0.2 MG/DL
BUN SERPL-MCNC: 11 MG/DL
C PEPTIDE SERPL-MCNC: 9.6 NG/ML
CALCIUM SERPL-MCNC: 9.2 MG/DL
CHLORIDE SERPL-SCNC: 106 MMOL/L
CO2 SERPL-SCNC: 19 MMOL/L
CREAT SERPL-MCNC: 0.54 MG/DL
DEPRECATED KAPPA LC FREE/LAMBDA SER: 1.2 RATIO
EGFR: 106 ML/MIN/1.73M2
GAMMAUPE: 8.1 %
GLUCOSE SERPL-MCNC: 102 MG/DL
IGA 24H UR QL IFE: NORMAL
IGA SER QL IEP: 440 MG/DL
IGG SER QL IEP: 1288 MG/DL
IGM SER QL IEP: 93 MG/DL
INSULIN SERPL-MCNC: 83.7 UU/ML
KAPPA LC 24H UR QL: NORMAL
KAPPA LC CSF-MCNC: 1.97 MG/DL
KAPPA LC SERPL-MCNC: 2.36 MG/DL
POTASSIUM SERPL-SCNC: 3.7 MMOL/L
PROT PATTERN 24H UR ELPH-IMP: NORMAL
PROT SERPL-MCNC: 7.7 G/DL
PROT UR-MCNC: 19 MG/DL
PROT UR-MCNC: 19 MG/DL
SODIUM SERPL-SCNC: 141 MMOL/L

## 2024-06-08 LAB
ALBUMIN MFR SERPL ELPH: 51.9 %
ALBUMIN SERPL-MCNC: 3.9 G/DL
ALBUMIN/GLOB SERPL: 1.1 RATIO
ALPHA1 GLOB MFR SERPL ELPH: 3.6 %
ALPHA1 GLOB SERPL ELPH-MCNC: 0.3 G/DL
ALPHA2 GLOB MFR SERPL ELPH: 10.6 %
ALPHA2 GLOB SERPL ELPH-MCNC: 0.8 G/DL
B-GLOBULIN MFR SERPL ELPH: 15.3 %
B-GLOBULIN SERPL ELPH-MCNC: 1.1 G/DL
GAMMA GLOB FLD ELPH-MCNC: 1.4 G/DL
GAMMA GLOB MFR SERPL ELPH: 18.6 %
INTERPRETATION SERPL IEP-IMP: NORMAL
PROT SERPL-MCNC: 7.5 G/DL
PROT SERPL-MCNC: 7.5 G/DL

## 2024-06-10 LAB — PROINSULIN SERPL-MCNC: 10.8 PMOL/L

## 2024-06-21 ENCOUNTER — APPOINTMENT (OUTPATIENT)
Dept: MRI IMAGING | Facility: IMAGING CENTER | Age: 59
End: 2024-06-21

## 2024-07-18 ENCOUNTER — APPOINTMENT (OUTPATIENT)
Dept: ENDOCRINOLOGY | Facility: CLINIC | Age: 59
End: 2024-07-18

## 2024-07-23 ENCOUNTER — APPOINTMENT (OUTPATIENT)
Dept: GASTROENTEROLOGY | Facility: CLINIC | Age: 59
End: 2024-07-23
Payer: MEDICARE

## 2024-07-23 DIAGNOSIS — E16.2 HYPOGLYCEMIA, UNSPECIFIED: ICD-10-CM

## 2024-07-23 PROCEDURE — 99443: CPT | Mod: 93

## 2024-07-23 NOTE — REASON FOR VISIT
[Home] : at home, [unfilled] , at the time of the visit. [Medical Office: ([facility])___] : at the medical office located in  [Patient] : the patient [Self] : self [This encounter was initiated by telehealth (audio with video) and converted to telephone (audio only) due to technical difficulties.] : This encounter was initiated by telehealth (audio with video) and converted to telephone (audio only) due to technical difficulties. [Initial Evaluation] : an initial evaluation

## 2024-07-23 NOTE — PHYSICAL EXAM
[Alert] : alert [Normal Voice/Communication] : normal voice/communication [Healthy Appearing] : healthy appearing [No Acute Distress] : no acute distress [Sclera] : the sclera and conjunctiva were normal [Hearing Threshold Finger Rub Not [place]] : hearing was normal [Normal Lips/Gums] : the lips and gums were normal [Oropharynx] : the oropharynx was normal [Normal Appearance] : the appearance of the neck was normal [No Neck Mass] : no neck mass was observed [No Respiratory Distress] : no respiratory distress [No Acc Muscle Use] : no accessory muscle use [Respiration, Rhythm And Depth] : normal respiratory rhythm and effort [Auscultation Breath Sounds / Voice Sounds] : lungs were clear to auscultation bilaterally [Heart Rate And Rhythm] : heart rate was normal and rhythm regular [Normal S1, S2] : normal S1 and S2 [Murmurs] : no murmurs [Bowel Sounds] : normal bowel sounds [Abdomen Tenderness] : non-tender [No Masses] : no abdominal mass palpated [Abdomen Soft] : soft [] : no hepatosplenomegaly [Oriented To Time, Place, And Person] : oriented to person, place, and time

## 2024-07-23 NOTE — REASON FOR VISIT
[Home] : at home, [unfilled] , at the time of the visit. [Medical Office: ([facility])___] : at the medical office located in  [Patient] : the patient [Self] : self [This encounter was initiated by telehealth (audio with video) and converted to telephone (audio only) due to technical difficulties.] : This encounter was initiated by telehealth (audio with video) and converted to telephone (audio only) due to technical difficulties. [Initial Evaluation] : an initial evaluation

## 2024-07-23 NOTE — ASSESSMENT
[FreeTextEntry1] : 58F with pmhx of breast cancer s/p b/l mastectomy and chemoradiation (2014), cholecystectomy, parathyroidectomy, obesity presenting for evaluation of hypoglycemia and concern for insulinoma. Referred by Dr. Anand for EUS. Has had intermittent episodes of hypoglycemia over the last 4 months, notes as low as 54. Reports had MRI which was unremarkable. Denies any other complaints, no abd pain, n/v/d/c, melena, hematochezia, fever/chills, jaundice, dark urine, or other issues. - Schedule EUS, possible FNB if lesion found.

## 2024-07-23 NOTE — HISTORY OF PRESENT ILLNESS
[FreeTextEntry1] : 58F with pmhx of breast cancer s/p b/l mastectomy and chemoradiation (2014), cholecystectomy, parathyroidectomy, obesity presenting for evaluation of hypoglycemia and concern for insulinoma. Referred by Dr. Anand for EUS. Has had intermittent episodes of hypoglycemia over the last 4 months, notes as low as 54. Reports had MRI which was unremarkable. Denies any other complaints, no abd pain, n/v/d/c, melena, hematochezia, fever/chills, jaundice, dark urine, or other issues.  PMHx: Above.  Medications: Omeprazole (states stopped all other meds at this point).  Allergies: NKDA.  Surgical Hx: Mastectomy, Cholecystectomy.  SH: Denies tobacco, etoh, or drug use.  FH: Denies fhx of GI disorders.

## 2024-07-26 ENCOUNTER — APPOINTMENT (OUTPATIENT)
Dept: HEPATOLOGY | Facility: CLINIC | Age: 59
End: 2024-07-26

## 2024-07-26 VITALS
OXYGEN SATURATION: 98 % | RESPIRATION RATE: 16 BRPM | SYSTOLIC BLOOD PRESSURE: 133 MMHG | WEIGHT: 186 LBS | DIASTOLIC BLOOD PRESSURE: 82 MMHG | HEIGHT: 58 IN | TEMPERATURE: 97.6 F | BODY MASS INDEX: 39.04 KG/M2 | HEART RATE: 90 BPM

## 2024-07-26 PROCEDURE — 99214 OFFICE O/P EST MOD 30 MIN: CPT

## 2024-07-26 NOTE — ASSESSMENT
[FreeTextEntry1] : 57 yo Female with Hx of breast cancer (Dx in 2014, s/p b/l mastectomy, s/p chemo-RT, was on anastrazole, switched to exemestane as per her oncologist, Dr. Miles in 2021), obesity (BMI 35->39), colon polyps, Hx of cholecystectomy (gallstones), Hx of parathyroidectomy (no records, per patient was told to have "benign tumor"), glaucoma, recent conjunctivitis, was sent for liver biopsy by gastroenterologist, Dr. Alejandre ([phone]) because of abnormal liver tests. Biopsy was done 9/24/2020 at [facility]. The biopsy reports mild steatohepatitis, also moderate chronic inflammation and interface hepatitis (grade 2-3 of 4) and septal fibrous expansion and focal bridging fibrosis (stage 3-4). Reviewed with Liver pathologist and the consensus was : moderate steatohepatitis, mild-to moderate chronic hepatitis with differential of AIH or AI pattern induced by drug, and while no features, but PBC or PSC could not be ruled out either. There was bile duct paucity. There was F3 fibrosis. FibroScan also showed S3 steatosis and F3 fibrosis.   Moderate steatohepatitis - Discussed natural Hx of fatty liver and life style modifications in details - FibroScan S3, F3 - She was started on Ozempic in 10/2021. Denied Hx of medullary thyroid carcinoma or FHx of medullary thyroid carcinoma. Denied Hx of MEN2, no thyroid ca, no pheochromocytoma. --- C/w diet, exercise. --- C/w low dose statin. --- Now off Ozempic and on acarbose. Mgmt. per endocrinology. (F3 per tests, acarbose would be contraindicated if F4 cirrhosis)  Mild to moderate chronic hepatitis, suggestive of AIH based on biopsy or AI pattern drug induced - Meds reviewed, neither of them is linked to AI type liver injury - ZAIRA was neg (except once 1:80), ASMA weak pos 1:20, with repeat neg, SLA and LKM neg, IgG was elevated but < 1.1x ULN and now WNL - Liver enzymes were minimally elevated, and most recently normalized - Based on above did not meet criteria of treatment for AIH, been monitored (Quantiferon neg, TPMT normal), especially that steroid could worsen her weight, DM etc. - Per biopsy report, paucity of bile ducts, could not r/o PBC,PSC, but no typical histological findings, AMA neg, IgM nl, ANCA neg; PBC specific ZAIRA was ordered, but never resulted. Since ALP remained elevated, started on Ursodiol in 2/2021. - Rest of liver workup unrevealing - US 11/2020 with hepatic steatosis, s/p cholecystectomy; MRCP 5/4/21 fatty liver, otherwise unremarkable - Discussed with Dr. Miles: patient was on letrozole, but not since 8/2020, now being on exemestane --- C/w Ursodiol (liver enzymes now normalized). Reports that has difficulty with pill size, denies dysphagia, but requested to change to capsule. --- Rheum referral placed previously b/o pos ZAIRA, not seen yet, advised to schedule.  Advanced fibrosis (F3, plus stigmata of advanced CLD, and low PLT) w/o signs of decompensation - No ascites - No PSE -No EVs. Was referred to GI when PLT was < 150. ECO 10/2022, gastritis and internal hemorrhoids. Now PLT > 150 and LSM < 20 kPa. - Last MRI abd/MRCP 8/2022 w/o focal hepatic lesion. ---C/w q 6 months HCC screening, US abd w/o focal hepatic lesion 8/2023.  RTC 3 months

## 2024-07-26 NOTE — PHYSICAL EXAM
[Scleral Icterus] : No Scleral Icterus [Hepatojugular Reflux] : patient did not have a sustained hepatojugular reflux [Spider Angioma] : Spider angioma(s) was(were) observed [Abdominal Bruit] : no abdominal bruit [Abdominal  Ascites] : no ascites [Non-Tender] : non-tender [Asterixis] : no asterixis observed [Jaundice] : No jaundice [Palmar Erythema] : Palmar Erythema [Depression] : no depression [Hallucinations] : ~T no ~M hallucinations [Delusions] : no ~T delusions [Suicidal Ideation] : no suicidal ideation [Homicidal Ideation] : no homicidal ideations [Preoccupiation With Violent Thoughts] : no preoccupation with violent thoughts [General Appearance - Alert] : alert [General Appearance - In No Acute Distress] : in no acute distress [General Appearance - Well Nourished] : well nourished [General Appearance - Well Developed] : well developed [Sclera] : the sclera and conjunctiva were normal [Oropharynx] : the oropharynx was normal [Neck Appearance] : the appearance of the neck was normal [Jugular Venous Distention Increased] : there was no jugular-venous distention [Respiration, Rhythm And Depth] : normal respiratory rhythm and effort [Exaggerated Use Of Accessory Muscles For Inspiration] : no accessory muscle use [Auscultation Breath Sounds / Voice Sounds] : lungs were clear to auscultation bilaterally [Heart Rate And Rhythm] : heart rate was normal and rhythm regular [Heart Sounds] : normal S1 and S2 [Edema] : there was no peripheral edema [Bowel Sounds] : normal bowel sounds [Abdomen Soft] : soft [Abdomen Tenderness] : non-tender [] : no hepato-splenomegaly [Abdomen Mass (___ Cm)] : no abdominal mass palpated [Abdomen Hernia] : no hernia was discovered [Cervical Lymph Nodes Enlarged Posterior Bilaterally] : posterior cervical [Cervical Lymph Nodes Enlarged Anterior Bilaterally] : anterior cervical [Supraclavicular Lymph Nodes Enlarged Bilaterally] : supraclavicular [Axillary Lymph Nodes Enlarged Bilaterally] : axillary [Inguinal Lymph Nodes Enlarged Bilaterally] : inguinal [No CVA Tenderness] : no ~M costovertebral angle tenderness [No Spinal Tenderness] : no spinal tenderness [Abnormal Walk] : normal gait [FreeTextEntry1] : Grossly intact [Oriented To Time, Place, And Person] : oriented to person, place, and time [Impaired Insight] : insight and judgment were intact [Affect] : the affect was normal [Mood] : the mood was normal [Memory Recent] : recent memory was not impaired [Memory Remote] : remote memory was not impaired

## 2024-07-26 NOTE — HISTORY OF PRESENT ILLNESS
[Needlestick Exposure] : no needlestick exposure [Infected Sexual Partner] : no infected sexual partner [IV Drug Use] : no IV drug use [Tattoo] : no tattoos [Body Piercing] : no body piercing [Hemodialysis] : no hemodialysis [Transfusion before 1992] : no transfusion before 1992 [Transplant before 1992] : no transplant before 1992 [Incarceration] : no incarceration [Alcohol Abuse] : no alcohol abuse [Autoimmune Disorder] : no autoimmune disorder [Household Contact to HBV] : no household contact to HBV [Travel to Endemic Area] : no travel to an endemic area [Occupational Exposure] : no occupational exposure [Cocaine Use] : no cocaine use [Fatigue] : denies fatigue [Malaise] : denies malaise [Diffuse Joint Pain (Arthralgias)] : arthralgias stable [Yellow Skin Or Eyes (Jaundice)] : denies jaundice [Abdominal Pain] : denies abdominal pain [Urine Tests Nonspecific Abnormal Findings Biliuria] : denies dark urine [Light Colored Bowel Movement (Acholic Stools)] : denies pale stools [Insomnia] : denies insomnia [Skin: Rash] : denies rash [de-identified] : Patient stated that do not need , preferred discussion in English.  59 yo obese (BMI 35->39->37)  Female with Hx of breast cancer (Dx in 2014, s/p b/l mastectomy, s/p chemo-RT, was on anastrazole, as per patient was changed by his oncologist, Dr. Miles), colon polyps, Hx of cholecystectomy (gallstones), Hx of parathyroidectomy (no records, per patient was told to have "benign tumor"), glaucoma, recent conjunctivitis, s/p liver biopsy by gastroenterologist, Dr. Alejandre ([phone]) because of abnormal liver tests. No liver test records available from that time. Biopsy was done 9/24/2020 at [facility]. The biopsy reports mild steatohepatitis, also moderate chronic inflammation and interface hepatitis (grade 2-3 of 4) and septal fibrous expansion and focal bridging fibrosis (stage 3-4).  Patient was not started on any medication for liver disease. Patient was sent for second opinion from Dr Miles`s office.    Biopsy samples were obtained and reviewed by Dr. Reji De La Cruz liver pathologist at [facility]: Moderate steatohepatitis and Mild-to moderate chronic hepatitis with differential of AIH or AI pattern induced by drug, and while no features, but PBC or PSC could not be ruled out either. There was bile duct paucity. There was F3 fibrosis. FibroScan also showed S3 steatosis and F3 fibrosis.  Patient`s liver enzymes were WNL except ALP. AI markers showed neg ZAIRA, minimally elevated IgG (< 1.1x or WNL) , weak pos ASMA (1:20), neg anti LKM and neg anti SLA. AMA was neg and IgM was nl.  Given the normal AI serology and normal transaminases, she was not started on any immunosuppressant. Given the mild elevation of ALP, and some bile duct paucity, she was started on Ursodiol, although there were no distinct features of PBC on liver biopsy, but as per report "could not be excluded".   MRI abd w/wo contrast and MRCP in 5/2021 showed unremarkable bile ducts and fatty liver.  She had bone scan, b/o Hx of breast cancer and solely ALP elevation, and it was negative for metastatic disease.  ALP has not changed significantly on Ursodiol.  She denied any prior Hx of liver disease, FHx of liver disease, has obesity, being born in [facility] but no other risk factors. Denied OTC or herbal meds/supplements. Denied alcohol.  She was noted to have few spider naevi, palmar erythema.   S/p Moderna 1st 3/1/21 2nd 3/29/21, had high fever for 3 days.  She has been following dermatology or maculopapular lesion, small, red, on extremities. Reportedly, she was told to have psoriasis, only on topical treatment.  She had colonoscopy on 8/4/21:  6 mm transverse colon sessile polyp, 7 mm cecum polyp, 3 mm polyp rectum. No biopsy result on file, repeat was recommended in 3 years.  In 9/2021 she was noted significant weight gain and developed diabetes. Previously she was prediabetic. She reported that her PCP applied for Ozempic in past but was not approved (was prediabetic that time).   Given her obesity and DM, applied for Ozempic and was approved. She was started on it 11/2021 and reported that her appetite decreased since then, and has not experience other side effects. She changed her insurance from 12/1/21 and b/o that lost follow up. She has not seen any hepatology interim, RTC in 3/2021 and then had lapse in insurance again.  She had COVID-19 in 1/2022.  She had lapse in insurance after 3/2022 visit again (see task list). Reported that she was given Ozempic samples by endocrinology. Reported that she reduced Ozempic dose  back to 0.25 because was not sure that enough till her endocrinologist returns. Reports fasting blood sugar around 90, and does not measure post meal. She lost 5 lb compared to Nov 2021, but none since the beginning of the year.  1/2023 visit No new complaints. Awaiting disability.   4/12/23 follow up. She was hospitalized at [facility] b/o fever, chest tightness. No records. Methylprednisolone 4 mg, QVar inhaler,  Albuterol inhaler. Reports that was seeing Austyn Castillo outpatient was treated with antibiotics, but did not improve and was sent to hospital reportedly b/o hypoxia in 80s (around March 21 st).  He completed methylprednisolone.  PET scan was done 4/11/23, no report yet, was ordered by Dr. Miles.   7/2023 follow up. No new complaints. Remains on Ozempic 1 mg, but gained weight.   10/18/23 Patient denies any hospitalizations since the last visit. Patient denies abdominal pain, nausea or vomiting. Patient reports that she has diarrhea with the intake of the food. Denies melena or hematochezia. Patient reports appointment with Dr. Moya today. Patient reports taking Ozempic 2mg dose since about 2 months ago. Patient reports a little weight loss.   12/2023 follow up because she reported that had vertigo when restarted Ozempic after 1 week break and it resolved after she stopped Ozempic. She also measured FSBS in 70s when on Ozempic. Currenlty not on antidiabetic. Introduced her to Dr. Yee who will see patient 1/2024 for DM  and weight loss mgmt, hormonal w/u.  She is here for follow up. Her DM still not well controlled, reports both hypoglycemic and hyperglycemic episodes. C/o significant bloating from acarbose, although less hypoglycemic episodes. Reports feeling better when takes Ozempic, but more hypoglycemic episodes (?). Will get MR/MRCP, repeat labs.  [de-identified] : Born [facility] (moved in [place], last traveled >6 years ago)

## 2024-07-26 NOTE — REVIEW OF SYSTEMS
[As Noted in HPI] : as noted in HPI [Fever] : no fever [Chills] : no chills [Feeling Poorly] : not feeling poorly [Feeling Tired] : not feeling tired [Eyesight Problems] : eyesight problems [Chest Pain] : no chest pain [Lower Ext Edema] : no lower extremity edema [Shortness Of Breath] : no shortness of breath [Cough] : no cough [Abdominal Pain] : no abdominal pain [Vomiting] : no vomiting [Constipation] : no constipation [Diarrhea] : no diarrhea [Heartburn] : heartburn [Melena] : no melena [Dysuria] : no dysuria [Incontinence] : no incontinence [Joint Swelling] : no joint swelling [Joint Stiffness] : no joint stiffness [Skin Lesions] : skin lesion [Negative] : Heme/Lymph [FreeTextEntry7] : Hx of reflux, controlled with diet and on famotidine [FreeTextEntry8] : urinary frequency for 3 years, being followed by Urology [FreeTextEntry9] : Knee pain and reports "bone pain" in lower extremities [de-identified] : small, circular, red, maculopapular lesions on extremities - following with dermatology [de-identified] : Hx of depression

## 2024-08-13 ENCOUNTER — TRANSCRIPTION ENCOUNTER (OUTPATIENT)
Age: 59
End: 2024-08-13

## 2024-08-13 ENCOUNTER — APPOINTMENT (OUTPATIENT)
Dept: GASTROENTEROLOGY | Facility: HOSPITAL | Age: 59
End: 2024-08-13

## 2024-08-13 ENCOUNTER — RESULT REVIEW (OUTPATIENT)
Age: 59
End: 2024-08-13

## 2024-09-04 ENCOUNTER — APPOINTMENT (OUTPATIENT)
Dept: GASTROENTEROLOGY | Facility: CLINIC | Age: 59
End: 2024-09-04

## 2024-09-04 ENCOUNTER — APPOINTMENT (OUTPATIENT)
Dept: ENDOCRINOLOGY | Facility: CLINIC | Age: 59
End: 2024-09-04

## 2024-09-05 ENCOUNTER — APPOINTMENT (OUTPATIENT)
Dept: ENDOCRINOLOGY | Facility: CLINIC | Age: 59
End: 2024-09-05
Payer: MEDICARE

## 2024-09-05 DIAGNOSIS — R42 DIZZINESS AND GIDDINESS: ICD-10-CM

## 2024-09-05 PROCEDURE — 99443: CPT

## 2024-09-05 RX ORDER — MECLIZINE HYDROCHLORIDE 25 MG/1
25 TABLET ORAL
Qty: 30 | Refills: 0 | Status: ACTIVE | COMMUNITY
Start: 2024-09-05 | End: 1900-01-01

## 2024-09-08 PROBLEM — R42 DIZZINESS: Status: ACTIVE | Noted: 2024-09-05

## 2024-10-02 ENCOUNTER — APPOINTMENT (OUTPATIENT)
Dept: HEPATOLOGY | Facility: CLINIC | Age: 59
End: 2024-10-02

## 2024-10-02 VITALS
OXYGEN SATURATION: 96 % | HEIGHT: 58 IN | RESPIRATION RATE: 16 BRPM | SYSTOLIC BLOOD PRESSURE: 112 MMHG | DIASTOLIC BLOOD PRESSURE: 72 MMHG | TEMPERATURE: 97.9 F | HEART RATE: 102 BPM | BODY MASS INDEX: 40.3 KG/M2 | WEIGHT: 192 LBS

## 2024-10-02 PROCEDURE — 99214 OFFICE O/P EST MOD 30 MIN: CPT

## 2024-10-02 NOTE — HISTORY OF PRESENT ILLNESS
[Needlestick Exposure] : no needlestick exposure [Infected Sexual Partner] : no infected sexual partner [IV Drug Use] : no IV drug use [Tattoo] : no tattoos [Body Piercing] : no body piercing [Hemodialysis] : no hemodialysis [Transfusion before 1992] : no transfusion before 1992 [Transplant before 1992] : no transplant before 1992 [Incarceration] : no incarceration [Alcohol Abuse] : no alcohol abuse [Autoimmune Disorder] : no autoimmune disorder [Household Contact to HBV] : no household contact to HBV [Travel to Endemic Area] : no travel to an endemic area [Occupational Exposure] : no occupational exposure [Cocaine Use] : no cocaine use [Fatigue] : denies fatigue [Malaise] : denies malaise [Diffuse Joint Pain (Arthralgias)] : arthralgias stable [Yellow Skin Or Eyes (Jaundice)] : denies jaundice [Abdominal Pain] : denies abdominal pain [Urine Tests Nonspecific Abnormal Findings Biliuria] : denies dark urine [Light Colored Bowel Movement (Acholic Stools)] : denies pale stools [Insomnia] : denies insomnia [Skin: Rash] : denies rash [de-identified] : Patient stated that do not need , preferred discussion in English.  59 yo obese (BMI 35->39->37)  Female with Hx of breast cancer (Dx in 2014, s/p b/l mastectomy, s/p chemo-RT, was on anastrazole, as per patient was changed by his oncologist, Dr. Miles), colon polyps, Hx of cholecystectomy (gallstones), Hx of parathyroidectomy (no records, per patient was told to have "benign tumor"), glaucoma, recent conjunctivitis, s/p liver biopsy by gastroenterologist, Dr. Alejandre ([phone]) because of abnormal liver tests. No liver test records available from that time. Biopsy was done 9/24/2020 at [facility]. The biopsy reports mild steatohepatitis, also moderate chronic inflammation and interface hepatitis (grade 2-3 of 4) and septal fibrous expansion and focal bridging fibrosis (stage 3-4).  Patient was not started on any medication for liver disease. Patient was sent for second opinion from Dr Miles`s office.    Biopsy samples were obtained and reviewed by Dr. Reji De La Cruz liver pathologist at [facility]: Moderate steatohepatitis and Mild-to moderate chronic hepatitis with differential of AIH or AI pattern induced by drug, and while no features, but PBC or PSC could not be ruled out either. There was bile duct paucity. There was F3 fibrosis. FibroScan also showed S3 steatosis and F3 fibrosis.  Patient`s liver enzymes were WNL except ALP. AI markers showed neg ZAIRA, minimally elevated IgG (< 1.1x or WNL) , weak pos ASMA (1:20), neg anti LKM and neg anti SLA. AMA was neg and IgM was nl.  Given the normal AI serology and normal transaminases, she was not started on any immunosuppressant. Given the mild elevation of ALP, and some bile duct paucity, she was started on Ursodiol, although there were no distinct features of PBC on liver biopsy, but as per report "could not be excluded".   MRI abd w/wo contrast and MRCP in 5/2021 showed unremarkable bile ducts and fatty liver.  She had bone scan, b/o Hx of breast cancer and solely ALP elevation, and it was negative for metastatic disease.  ALP has not changed significantly on Ursodiol.  She denied any prior Hx of liver disease, FHx of liver disease, has obesity, being born in [facility] but no other risk factors. Denied OTC or herbal meds/supplements. Denied alcohol.  She was noted to have few spider naevi, palmar erythema.   S/p Moderna 1st 3/1/21 2nd 3/29/21, had high fever for 3 days.  She has been following dermatology or maculopapular lesion, small, red, on extremities. Reportedly, she was told to have psoriasis, only on topical treatment.  She had colonoscopy on 8/4/21:  6 mm transverse colon sessile polyp, 7 mm cecum polyp, 3 mm polyp rectum. No biopsy result on file, repeat was recommended in 3 years.  In 9/2021 she was noted significant weight gain and developed diabetes. Previously she was prediabetic. She reported that her PCP applied for Ozempic in past but was not approved (was prediabetic that time).   Given her obesity and DM, applied for Ozempic and was approved. She was started on it 11/2021 and reported that her appetite decreased since then, and has not experience other side effects. She changed her insurance from 12/1/21 and b/o that lost follow up. She has not seen any hepatology interim, RTC in 3/2021 and then had lapse in insurance again.  She had COVID-19 in 1/2022.  She had lapse in insurance after 3/2022 visit again (see task list). Reported that she was given Ozempic samples by endocrinology. Reported that she reduced Ozempic dose  back to 0.25 because was not sure that enough till her endocrinologist returns. Reports fasting blood sugar around 90, and does not measure post meal. She lost 5 lb compared to Nov 2021, but none since the beginning of the year.  1/2023 visit No new complaints. Awaiting disability.   4/12/23 follow up. She was hospitalized at [facility] b/o fever, chest tightness. No records. Methylprednisolone 4 mg, QVar inhaler,  Albuterol inhaler. Reports that was seeing Austyn Castillo outpatient was treated with antibiotics, but did not improve and was sent to hospital reportedly b/o hypoxia in 80s (around March 21 st).  He completed methylprednisolone.  PET scan was done 4/11/23, no report yet, was ordered by Dr. Miles.   7/2023 follow up. No new complaints. Remains on Ozempic 1 mg, but gained weight.   10/18/23 Patient denies any hospitalizations since the last visit. Patient denies abdominal pain, nausea or vomiting. Patient reports that she has diarrhea with the intake of the food. Denies melena or hematochezia. Patient reports appointment with Dr. Moya today. Patient reports taking Ozempic 2mg dose since about 2 months ago. Patient reports a little weight loss.   12/2023 follow up because she reported that had vertigo when restarted Ozempic after 1 week break and it resolved after she stopped Ozempic. She also measured FSBS in 70s when on Ozempic. Currenlty not on antidiabetic. Introduced her to Dr. Yee who will see patient 1/2024 for DM  and weight loss mgmt, hormonal w/u.  5/31/24 follow up. Her DM still not well controlled, reports both hypoglycemic and hyperglycemic episodes. C/o significant bloating from acarbose, although less hypoglycemic episodes. Reports feeling better when takes Ozempic, but more hypoglycemic episodes (?). Will get MR/MRCP, repeat labs.   She is here for follow up, accompanied by . Still reports frequent episodes of dizziness and not only at the time of hypoglycemia. Blood sugar log reviewed and had few mild hypoglycemic episodes.  Noted high insulin level, being worked up for insulinoma, pending EUS, but as per discussion with endocrinologist, the hormone measurements were not accurate b/o inappropriate timing, no fasting etc. She has seen 2nd opinion endocrinologist.  She was also noted to have low cortisol per d/w hem/onc.  She had labs w/ Dr. Miles, where liver enzymes were "good" but no report available at the time of visit.  [de-identified] : [facility] (moved in [place], last traveled >6 years ago)

## 2024-10-02 NOTE — REVIEW OF SYSTEMS
[As Noted in HPI] : as noted in HPI [Fever] : no fever [Chills] : no chills [Feeling Poorly] : not feeling poorly [Feeling Tired] : not feeling tired [Eyesight Problems] : eyesight problems [Chest Pain] : no chest pain [Lower Ext Edema] : no lower extremity edema [Shortness Of Breath] : no shortness of breath [Cough] : no cough [Abdominal Pain] : no abdominal pain [Vomiting] : no vomiting [Constipation] : no constipation [Diarrhea] : no diarrhea [Heartburn] : heartburn [Melena] : no melena [Dysuria] : no dysuria [Incontinence] : no incontinence [Joint Swelling] : no joint swelling [Joint Stiffness] : no joint stiffness [Skin Lesions] : skin lesion [Negative] : Heme/Lymph [FreeTextEntry7] : Hx of reflux, controlled with diet and on famotidine [FreeTextEntry8] : urinary frequency for 3 years, being followed by Urology [FreeTextEntry9] : Knee pain and reports "bone pain" in lower extremities [de-identified] : small, circular, red, maculopapular lesions on extremities - following with dermatology [de-identified] : Hx of depression

## 2024-10-02 NOTE — PHYSICAL EXAM
[Scleral Icterus] : No Scleral Icterus [Hepatojugular Reflux] : patient did not have a sustained hepatojugular reflux [Spider Angioma] : Spider angioma(s) was(were) observed [Abdominal Bruit] : no abdominal bruit [Abdominal  Ascites] : no ascites [Non-Tender] : non-tender [Asterixis] : no asterixis observed [Jaundice] : No jaundice [Palmar Erythema] : Palmar Erythema [Depression] : no depression [Hallucinations] : ~T no ~M hallucinations [Delusions] : no ~T delusions [Suicidal Ideation] : no suicidal ideation [Homicidal Ideation] : no homicidal ideations [Preoccupiation With Violent Thoughts] : no preoccupation with violent thoughts [General Appearance - Alert] : alert [General Appearance - In No Acute Distress] : in no acute distress [General Appearance - Well Nourished] : well nourished [General Appearance - Well Developed] : well developed [Sclera] : the sclera and conjunctiva were normal [Oropharynx] : the oropharynx was normal [Neck Appearance] : the appearance of the neck was normal [Jugular Venous Distention Increased] : there was no jugular-venous distention [Respiration, Rhythm And Depth] : normal respiratory rhythm and effort [Exaggerated Use Of Accessory Muscles For Inspiration] : no accessory muscle use [Auscultation Breath Sounds / Voice Sounds] : lungs were clear to auscultation bilaterally [Heart Rate And Rhythm] : heart rate was normal and rhythm regular [Heart Sounds] : normal S1 and S2 [Edema] : there was no peripheral edema [Bowel Sounds] : normal bowel sounds [Abdomen Soft] : soft [Abdomen Tenderness] : non-tender [] : no hepato-splenomegaly [Abdomen Mass (___ Cm)] : no abdominal mass palpated [Abdomen Hernia] : no hernia was discovered [Cervical Lymph Nodes Enlarged Posterior Bilaterally] : posterior cervical [Supraclavicular Lymph Nodes Enlarged Bilaterally] : supraclavicular [Cervical Lymph Nodes Enlarged Anterior Bilaterally] : anterior cervical [Axillary Lymph Nodes Enlarged Bilaterally] : axillary [Inguinal Lymph Nodes Enlarged Bilaterally] : inguinal [No CVA Tenderness] : no ~M costovertebral angle tenderness [No Spinal Tenderness] : no spinal tenderness [Abnormal Walk] : normal gait [FreeTextEntry1] : Grossly intact [Oriented To Time, Place, And Person] : oriented to person, place, and time [Impaired Insight] : insight and judgment were intact [Affect] : the affect was normal [Mood] : the mood was normal [Memory Recent] : recent memory was not impaired [Memory Remote] : remote memory was not impaired

## 2024-10-02 NOTE — ASSESSMENT
[FreeTextEntry1] : 57 yo Female with Hx of breast cancer (Dx in 2014, s/p b/l mastectomy, s/p chemo-RT, was on anastrazole, switched to exemestane as per her oncologist, Dr. Miles in 2021), obesity (BMI 35->39), colon polyps, Hx of cholecystectomy (gallstones), Hx of parathyroidectomy (no records, per patient was told to have "benign tumor"), glaucoma, recent conjunctivitis, was sent for liver biopsy by gastroenterologist, Dr. Alejandre ([phone]) because of abnormal liver tests. Biopsy was done 9/24/2020 at [facility]. The biopsy reports mild steatohepatitis, also moderate chronic inflammation and interface hepatitis (grade 2-3 of 4) and septal fibrous expansion and focal bridging fibrosis (stage 3-4). Reviewed with Liver pathologist and the consensus was : moderate steatohepatitis, mild-to moderate chronic hepatitis with differential of AIH or AI pattern induced by drug, and while no features, but PBC or PSC could not be ruled out either. There was bile duct paucity. There was F3 fibrosis. FibroScan also showed S3 steatosis and F3 fibrosis.   Moderate steatohepatitis - Discussed natural Hx of fatty liver and life style modifications in details - FibroScan S3, F3 - She was started on Ozempic in 10/2021. Denied Hx of medullary thyroid carcinoma or FHx of medullary thyroid carcinoma. Denied Hx of MEN2, no thyroid ca, no pheochromocytoma. --- C/w diet, exercise. --- C/w low dose statin. --- Now off Ozempic and on acarbose. Mgmt. per endocrinology. (F3 per tests, acarbose would be contraindicated if F4 cirrhosis)  Mild to moderate chronic hepatitis, suggestive of AIH based on biopsy or AI pattern drug induced - Meds reviewed, neither of them is linked to AI type liver injury - ZAIRA was neg (except once 1:80), ASMA weak pos 1:20, with repeat neg, SLA and LKM neg, IgG was elevated but < 1.1x ULN and now WNL - Liver enzymes were minimally elevated, and most recently normalized - Based on above did not meet criteria of treatment for AIH, been monitored (Quantiferon neg, TPMT normal), especially that steroid could worsen her weight, DM etc. - Per biopsy report, paucity of bile ducts, could not r/o PBC,PSC, but no typical histological findings, AMA neg, IgM nl, ANCA neg; PBC specific ZAIRA was ordered, but never resulted. Since ALP remained elevated, started on Ursodiol in 2/2021. - Rest of liver workup unrevealing - US 11/2020 with hepatic steatosis, s/p cholecystectomy; MRCP 5/4/21 fatty liver, otherwise unremarkable - Discussed with Dr. Miles: patient was on letrozole, but not since 8/2020, now being on exemestane --- C/w Ursodiol (liver enzymes now normalized). Reports that has difficulty with pill size, denies dysphagia, but requested to change to capsule. --- Rheum referral placed previously b/o pos ZAIRA, not seen yet, advised to schedule.  Advanced fibrosis (F3, plus stigmata of advanced CLD, and low PLT) w/o signs of decompensation - No ascites - No PSE -No EVs. Was referred to GI when PLT was < 150. ECO 10/2022, gastritis and internal hemorrhoids. Now PLT > 150 and LSM < 20 kPa. - Last MRI abd/MRCP 6/2024 w/o focal hepatic lesion. ---C/w q 6 months HCC screening, next due 12/2024  RTC after EUS, also patient to bring blood work results to office next week

## 2025-02-05 ENCOUNTER — APPOINTMENT (OUTPATIENT)
Dept: ENDOCRINOLOGY | Facility: CLINIC | Age: 60
End: 2025-02-05

## 2025-03-21 ENCOUNTER — APPOINTMENT (OUTPATIENT)
Dept: HEPATOLOGY | Facility: CLINIC | Age: 60
End: 2025-03-21
Payer: MEDICARE

## 2025-03-21 VITALS
TEMPERATURE: 97.9 F | HEIGHT: 58 IN | WEIGHT: 186 LBS | BODY MASS INDEX: 39.04 KG/M2 | DIASTOLIC BLOOD PRESSURE: 78 MMHG | HEART RATE: 88 BPM | SYSTOLIC BLOOD PRESSURE: 124 MMHG | RESPIRATION RATE: 16 BRPM | OXYGEN SATURATION: 97 %

## 2025-03-21 DIAGNOSIS — K76.0 FATTY (CHANGE OF) LIVER, NOT ELSEWHERE CLASSIFIED: ICD-10-CM

## 2025-03-21 DIAGNOSIS — E66.9 OBESITY, UNSPECIFIED: ICD-10-CM

## 2025-03-21 DIAGNOSIS — R76.8 OTHER SPECIFIED ABNORMAL IMMUNOLOGICAL FINDINGS IN SERUM: ICD-10-CM

## 2025-03-21 DIAGNOSIS — K74.00 HEPATIC FIBROSIS, UNSPECIFIED: ICD-10-CM

## 2025-03-21 DIAGNOSIS — K74.3 PRIMARY BILIARY CIRRHOSIS: ICD-10-CM

## 2025-03-21 PROCEDURE — 99214 OFFICE O/P EST MOD 30 MIN: CPT

## 2025-03-21 RX ORDER — TOPIRAMATE 25 MG/1
25 TABLET, FILM COATED ORAL
Qty: 30 | Refills: 1 | Status: ACTIVE | COMMUNITY
Start: 2025-03-21 | End: 1900-01-01

## 2025-03-22 LAB
ALBUMIN SERPL ELPH-MCNC: 4.1 G/DL
ALP BLD-CCNC: 141 U/L
ALT SERPL-CCNC: 109 U/L
ANION GAP SERPL CALC-SCNC: 13 MMOL/L
AST SERPL-CCNC: 110 U/L
BILIRUB DIRECT SERPL-MCNC: 0.1 MG/DL
BILIRUB INDIRECT SERPL-MCNC: 0.2 MG/DL
BILIRUB SERPL-MCNC: 0.4 MG/DL
BUN SERPL-MCNC: 13 MG/DL
CALCIUM SERPL-MCNC: 9.3 MG/DL
CHLORIDE SERPL-SCNC: 105 MMOL/L
CO2 SERPL-SCNC: 22 MMOL/L
CREAT SERPL-MCNC: 0.61 MG/DL
EGFRCR SERPLBLD CKD-EPI 2021: 102 ML/MIN/1.73M2
GLUCOSE SERPL-MCNC: 132 MG/DL
HCT VFR BLD CALC: 44.4 %
HGB BLD-MCNC: 13.8 G/DL
INR PPP: 1.03 RATIO
MCHC RBC-ENTMCNC: 28.6 PG
MCHC RBC-ENTMCNC: 31.1 G/DL
MCV RBC AUTO: 91.9 FL
PLATELET # BLD AUTO: 153 K/UL
POTASSIUM SERPL-SCNC: 3.8 MMOL/L
PROT SERPL-MCNC: 7.8 G/DL
PT BLD: 12.2 SEC
RBC # BLD: 4.83 M/UL
RBC # FLD: 15.2 %
SODIUM SERPL-SCNC: 140 MMOL/L
WBC # FLD AUTO: 5.63 K/UL

## 2025-03-26 ENCOUNTER — APPOINTMENT (OUTPATIENT)
Dept: GASTROENTEROLOGY | Facility: CLINIC | Age: 60
End: 2025-03-26

## 2025-03-31 ENCOUNTER — APPOINTMENT (OUTPATIENT)
Dept: ULTRASOUND IMAGING | Facility: CLINIC | Age: 60
End: 2025-03-31
Payer: MEDICARE

## 2025-03-31 PROCEDURE — 76700 US EXAM ABDOM COMPLETE: CPT

## 2025-05-20 ENCOUNTER — APPOINTMENT (OUTPATIENT)
Dept: HEPATOLOGY | Facility: CLINIC | Age: 60
End: 2025-05-20

## 2025-05-20 VITALS
BODY MASS INDEX: 38.87 KG/M2 | SYSTOLIC BLOOD PRESSURE: 109 MMHG | OXYGEN SATURATION: 98 % | HEART RATE: 79 BPM | WEIGHT: 186 LBS | DIASTOLIC BLOOD PRESSURE: 75 MMHG | TEMPERATURE: 97.4 F | RESPIRATION RATE: 16 BRPM

## 2025-05-20 PROCEDURE — 99214 OFFICE O/P EST MOD 30 MIN: CPT

## 2025-06-25 ENCOUNTER — APPOINTMENT (OUTPATIENT)
Dept: MRI IMAGING | Facility: CLINIC | Age: 60
End: 2025-06-25

## 2025-07-08 ENCOUNTER — APPOINTMENT (OUTPATIENT)
Dept: GASTROENTEROLOGY | Facility: CLINIC | Age: 60
End: 2025-07-08
Payer: MEDICARE

## 2025-07-08 PROCEDURE — 99214 OFFICE O/P EST MOD 30 MIN: CPT | Mod: 93

## 2025-07-15 ENCOUNTER — APPOINTMENT (OUTPATIENT)
Dept: HEPATOLOGY | Facility: CLINIC | Age: 60
End: 2025-07-15
Payer: MEDICARE

## 2025-07-15 VITALS
RESPIRATION RATE: 16 BRPM | DIASTOLIC BLOOD PRESSURE: 78 MMHG | HEIGHT: 58 IN | SYSTOLIC BLOOD PRESSURE: 124 MMHG | TEMPERATURE: 98 F | OXYGEN SATURATION: 96 % | WEIGHT: 189 LBS | HEART RATE: 82 BPM | BODY MASS INDEX: 39.67 KG/M2

## 2025-07-15 PROCEDURE — 83036 HEMOGLOBIN GLYCOSYLATED A1C: CPT | Mod: QW

## 2025-07-15 PROCEDURE — 99214 OFFICE O/P EST MOD 30 MIN: CPT

## 2025-07-21 LAB — HBA1C MFR BLD HPLC: 6.7

## 2025-08-11 ENCOUNTER — OUTPATIENT (OUTPATIENT)
Dept: OUTPATIENT SERVICES | Facility: HOSPITAL | Age: 60
LOS: 1 days | End: 2025-08-11
Payer: MEDICARE

## 2025-08-11 ENCOUNTER — RESULT REVIEW (OUTPATIENT)
Age: 60
End: 2025-08-11

## 2025-08-11 ENCOUNTER — APPOINTMENT (OUTPATIENT)
Dept: GASTROENTEROLOGY | Facility: HOSPITAL | Age: 60
End: 2025-08-11

## 2025-08-11 ENCOUNTER — TRANSCRIPTION ENCOUNTER (OUTPATIENT)
Age: 60
End: 2025-08-11

## 2025-08-11 VITALS
HEART RATE: 65 BPM | OXYGEN SATURATION: 100 % | RESPIRATION RATE: 20 BRPM | DIASTOLIC BLOOD PRESSURE: 70 MMHG | WEIGHT: 184.09 LBS | TEMPERATURE: 97 F | HEIGHT: 60 IN | SYSTOLIC BLOOD PRESSURE: 120 MMHG

## 2025-08-11 VITALS
HEART RATE: 67 BPM | SYSTOLIC BLOOD PRESSURE: 112 MMHG | RESPIRATION RATE: 20 BRPM | OXYGEN SATURATION: 97 % | DIASTOLIC BLOOD PRESSURE: 77 MMHG

## 2025-08-11 DIAGNOSIS — K75.4 AUTOIMMUNE HEPATITIS: ICD-10-CM

## 2025-08-11 DIAGNOSIS — Z90.89 ACQUIRED ABSENCE OF OTHER ORGANS: Chronic | ICD-10-CM

## 2025-08-11 LAB
GLUCOSE BLDC GLUCOMTR-MCNC: 80 MG/DL — SIGNIFICANT CHANGE UP (ref 70–99)
GLUCOSE BLDC GLUCOMTR-MCNC: 93 MG/DL — SIGNIFICANT CHANGE UP (ref 70–99)

## 2025-08-11 PROCEDURE — 88305 TISSUE EXAM BY PATHOLOGIST: CPT | Mod: 26

## 2025-08-11 PROCEDURE — 88312 SPECIAL STAINS GROUP 1: CPT | Mod: 26

## 2025-08-11 PROCEDURE — 43239 EGD BIOPSY SINGLE/MULTIPLE: CPT | Mod: 59

## 2025-08-11 PROCEDURE — 88313 SPECIAL STAINS GROUP 2: CPT | Mod: 26

## 2025-08-11 PROCEDURE — 88307 TISSUE EXAM BY PATHOLOGIST: CPT | Mod: 26

## 2025-08-11 PROCEDURE — 43242 EGD US FINE NEEDLE BX/ASPIR: CPT

## 2025-08-18 LAB — SURGICAL PATHOLOGY STUDY: SIGNIFICANT CHANGE UP

## 2025-08-29 ENCOUNTER — APPOINTMENT (OUTPATIENT)
Dept: HEPATOLOGY | Facility: CLINIC | Age: 60
End: 2025-08-29
Payer: MEDICARE

## 2025-08-29 VITALS
DIASTOLIC BLOOD PRESSURE: 72 MMHG | HEIGHT: 58 IN | SYSTOLIC BLOOD PRESSURE: 113 MMHG | BODY MASS INDEX: 39.67 KG/M2 | OXYGEN SATURATION: 98 % | WEIGHT: 189 LBS | RESPIRATION RATE: 16 BRPM | HEART RATE: 71 BPM

## 2025-08-29 DIAGNOSIS — K74.00 HEPATIC FIBROSIS, UNSPECIFIED: ICD-10-CM

## 2025-08-29 DIAGNOSIS — K75.4 AUTOIMMUNE HEPATITIS: ICD-10-CM

## 2025-08-29 DIAGNOSIS — K76.0 FATTY (CHANGE OF) LIVER, NOT ELSEWHERE CLASSIFIED: ICD-10-CM

## 2025-08-29 PROCEDURE — 99214 OFFICE O/P EST MOD 30 MIN: CPT

## 2025-09-17 ENCOUNTER — APPOINTMENT (OUTPATIENT)
Dept: HEPATOLOGY | Facility: CLINIC | Age: 60
End: 2025-09-17

## 2025-09-17 VITALS
OXYGEN SATURATION: 97 % | TEMPERATURE: 97 F | DIASTOLIC BLOOD PRESSURE: 81 MMHG | SYSTOLIC BLOOD PRESSURE: 129 MMHG | HEIGHT: 58 IN | RESPIRATION RATE: 16 BRPM | HEART RATE: 87 BPM | WEIGHT: 194 LBS | BODY MASS INDEX: 40.72 KG/M2

## 2025-09-17 RX ORDER — PREDNISONE 10 MG/1
10 TABLET ORAL DAILY
Qty: 30 | Refills: 0 | Status: ACTIVE | COMMUNITY
Start: 2025-09-17 | End: 1900-01-01

## (undated) DEVICE — DRSG CURITY GAUZE SPONGE 4 X 4" 12-PLY

## (undated) DEVICE — SYR LUER LOK 50CC

## (undated) DEVICE — TUBING CANNULA SALTER LABS NASAL ADULT 7FT

## (undated) DEVICE — LUBRICATING JELLY HR ONE SHOT 3G

## (undated) DEVICE — CATH IV SAFE BC 22G X 1" (BLUE)

## (undated) DEVICE — RETRIEVER ROTH NET PLATINUM-UNIVERSAL

## (undated) DEVICE — FORCEP RADIAL JAW 4 W NDL 2.2MM 2.8MM 240CM ORANGE DISP

## (undated) DEVICE — DRSG CURITY GAUZE SPONGE 4 X 4" 12-PLY NON-STERILE

## (undated) DEVICE — DRSG 2X2

## (undated) DEVICE — BIOPSY FORCEP RADIAL JAW 4 STANDARD WITH NEEDLE

## (undated) DEVICE — SOLIDIFIER ISOLYZER 2000 CC

## (undated) DEVICE — NDL ACQUIRE EUS FNB 19G FLEX

## (undated) DEVICE — TUBE O2 SUPL CRUSH RESIS CONN SOUTHSIDE ONLY

## (undated) DEVICE — TUBING IV SET GRAVITY 3Y 100" MACRO

## (undated) DEVICE — BIOPSY FORCEP COLD DISP

## (undated) DEVICE — CLAMP BX HOT RAD JAW 3

## (undated) DEVICE — CONTAINER FORMALIN 80ML YELLOW

## (undated) DEVICE — TUBING MEDI-VAC W MAXIGRIP CONNECTORS 1/4"X6'

## (undated) DEVICE — DRSG BANDAID 0.75X3"

## (undated) DEVICE — NDL INJ SCLERO INTERJECT 23G

## (undated) DEVICE — FORCEP BIOPSY 2.5MM DISP

## (undated) DEVICE — BITE BLOCK ADULT 20 X 27MM (GREEN)

## (undated) DEVICE — SALIVA EJECTOR (BLUE)

## (undated) DEVICE — UNDERPAD LINEN SAVER 17 X 24"

## (undated) DEVICE — DENTURE CUP PINK

## (undated) DEVICE — GOWN LG

## (undated) DEVICE — LUBRICATING JELLY ONESHOT 1.25OZ

## (undated) DEVICE — CATH ELCTR GLIDE PRB 7FR

## (undated) DEVICE — SNARE LOOP POLY DISP 30MM LOOP

## (undated) DEVICE — VENODYNE/SCD SLEEVE CALF MEDIUM

## (undated) DEVICE — BASIN EMESIS 10IN GRADUATED MAUVE

## (undated) DEVICE — SENSOR O2 FINGER ADULT

## (undated) DEVICE — MASK OXYGEN PANORAMIC

## (undated) DEVICE — STERIS DEFENDO 3-PIECE KIT (AIR/WATER, SUCTION & BIOPSY VALVES)

## (undated) DEVICE — ELCTR ECG CONDUCTIVE ADHESIVE

## (undated) DEVICE — ADAPTER ENDO CHNL SINGLE USE

## (undated) DEVICE — FORMALIN CUPS 10% BUFFERED

## (undated) DEVICE — KIT ENDO PROCEDURE CUST W/VLV

## (undated) DEVICE — SOL INJ NS 0.9% 500ML 1-PORT

## (undated) DEVICE — PACK IV START WITH CHG